# Patient Record
Sex: MALE | Race: BLACK OR AFRICAN AMERICAN | NOT HISPANIC OR LATINO | Employment: FULL TIME | URBAN - METROPOLITAN AREA
[De-identification: names, ages, dates, MRNs, and addresses within clinical notes are randomized per-mention and may not be internally consistent; named-entity substitution may affect disease eponyms.]

---

## 2017-02-14 ENCOUNTER — HOSPITAL ENCOUNTER (EMERGENCY)
Facility: HOSPITAL | Age: 57
Discharge: HOME | End: 2017-02-14
Attending: EMERGENCY MEDICINE | Admitting: EMERGENCY MEDICINE
Payer: MEDICAID

## 2017-02-14 VITALS
BODY MASS INDEX: 31.81 KG/M2 | OXYGEN SATURATION: 95 % | TEMPERATURE: 99 F | RESPIRATION RATE: 17 BRPM | WEIGHT: 240 LBS | HEART RATE: 112 BPM | SYSTOLIC BLOOD PRESSURE: 171 MMHG | HEIGHT: 73 IN | DIASTOLIC BLOOD PRESSURE: 75 MMHG

## 2017-02-14 DIAGNOSIS — S39.012A LUMBAR STRAIN, INITIAL ENCOUNTER: Primary | ICD-10-CM

## 2017-02-14 PROCEDURE — 99284 EMERGENCY DEPT VISIT MOD MDM: CPT | Mod: ,,, | Performed by: EMERGENCY MEDICINE

## 2017-02-14 PROCEDURE — 25000003 PHARM REV CODE 250: Performed by: EMERGENCY MEDICINE

## 2017-02-14 PROCEDURE — 99283 EMERGENCY DEPT VISIT LOW MDM: CPT

## 2017-02-14 RX ORDER — METHOCARBAMOL 750 MG/1
1500 TABLET, FILM COATED ORAL 3 TIMES DAILY
Qty: 30 TABLET | Refills: 0 | Status: SHIPPED | OUTPATIENT
Start: 2017-02-14 | End: 2017-02-20

## 2017-02-14 RX ORDER — IBUPROFEN 200 MG
600 TABLET ORAL EVERY 6 HOURS PRN
Qty: 20 TABLET | Refills: 0 | Status: SHIPPED | OUTPATIENT
Start: 2017-02-14 | End: 2017-02-20

## 2017-02-14 RX ORDER — NAPROXEN 500 MG/1
500 TABLET ORAL
Status: COMPLETED | OUTPATIENT
Start: 2017-02-14 | End: 2017-02-14

## 2017-02-14 RX ADMIN — NAPROXEN 500 MG: 500 TABLET ORAL at 11:02

## 2017-02-14 NOTE — ED AVS SNAPSHOT
[facility]  [address]  [facility] [address]               Polo Small   2017 11:01 PM   ED    Description:  Male : 1960   Department:  [facility]           Your Care was Coordinated By:     Provider Role From To    Michelle Navarrete MD Attending Provider [phone] --      Reason for Visit     Back Pain           Diagnoses this Visit        Comments    Lumbar strain, initial encounter    -  Primary       ED Disposition     ED Disposition Condition Comment    Discharge             To Do List           Follow-up Information     Follow up with Primary Doctor No In 1 week(s).       These Medications        Disp Refills Start End    methocarbamol (ROBAXIN) 750 MG Tab 30 tablet 0 2017    Take 2 tablets (1,500 mg total) by mouth 3 (three) times daily. - Oral    ibuprofen (ADVIL,MOTRIN) 200 MG tablet 20 tablet 0 2017     Take 3 tablets (600 mg total) by mouth every 6 (six) hours as needed for Pain. - Oral      [facility]     [facility] -  Assistance  Registered nurses in the [facility] provide clinical advisement, health education, appointment booking, and other advisory services.  Call for this free service at [phone].             Medications           Message regarding Medications     Verify the changes and/or additions to your medication regime listed below are the same as discussed with your clinician today.  If any of these changes or additions are incorrect, please notify your healthcare provider.        START taking these NEW medications        Refills    methocarbamol (ROBAXIN) 750 MG Tab 0    Sig: Take 2 tablets (1,500 mg total) by mouth 3 (three) times daily.    Class: Print    Route: Oral    ibuprofen (ADVIL,MOTRIN) 200 MG tablet 0    Sig: Take 3 tablets (600 mg total) by mouth every 6 (six) hours as needed for Pain.    Class: Print    Route: Oral      These  "medications were administered today        Dose Freq    naproxen tablet 500 mg 500 mg ED 1 Time    Sig: Take 1 tablet (500 mg total) by mouth ED 1 Time.    Class: Normal    Route: Oral           Verify that the below list of medications is an accurate representation of the medications you are currently taking.  If none reported, the list may be blank. If incorrect, please contact your healthcare provider. Carry this list with you in case of emergency.           Current Medications     ibuprofen (ADVIL,MOTRIN) 200 MG tablet Take 3 tablets (600 mg total) by mouth every 6 (six) hours as needed for Pain.    ibuprofen (ADVIL,MOTRIN) 800 MG tablet Take 1 tablet (800 mg total) by mouth every 6 (six) hours as needed for Pain.    methocarbamol (ROBAXIN) 750 MG Tab Take 2 tablets (1,500 mg total) by mouth 3 (three) times daily.    naproxen tablet 500 mg Take 1 tablet (500 mg total) by mouth ED 1 Time.           Clinical Reference Information           Your Vitals Were     BP Pulse Temp Resp Height Weight    171/75 (BP Location: Right arm, Patient Position: Sitting) 112 99.1 °F (37.3 °C) (Oral) 17 6' 1" (1.854 m) 108.9 kg (240 lb)    SpO2 BMI             95% 31.66 kg/m2         Allergies as of 2/14/2017     No Known Allergies      Immunizations Administered on Date of Encounter - 2/14/2017     None      ED Micro, Lab, POCT     None      ED Imaging Orders     None        Discharge Instructions           Back Sprain or Strain    Injury to the muscles (strain) or ligaments (sprain) around the spine can be troubling. Injury may occur after a sudden forceful twisting or bending force such as in a car accident, after a simple awkward movement, or after lifting something heavy with poor body positioning. In any case, muscle spasm is often present and adds to the pain.  Thankfully, most people feel better in 1 to 2 weeks, and most of the rest in 1 to 2 months. Most people can remain active. Unless you had a forceful or traumatic " physical injury such as a car accident or fall, X-rays may not be ordered for the first evaluation of a back sprain or strain. If pain continues and does not respond to medical treatment, your healthcare provider may then order X-rays and other tests.  Home care  The following guidelines will help you care for your injury at home:  · When in bed, try to find a comfortable position. A firm mattress is best. Try lying flat on your back with pillows under your knees. You can also try lying on your side with your knees bent up toward your chest and a pillow between your knees.  · Don't sit for long periods. Try not to take long car rides or take other trips that have you sitting for a long time. This puts more stress on the lower back than standing or walking.  · During the first 24 to 72 hours after an injury or flare-up, apply an ice pack to the painful area for 20 minutes. Then remove it for 20 minutes. Do this for 60 to 90 minutes, or several times a day. This will reduce swelling and pain. Be sure to wrap the ice pack in a thin towel or plastic to protect your skin.  · You can start with ice, then switch to heat. Heat from a hot shower, hot bath, or heating pad reduces pain and works well for muscle spasms. Put heat on the painful area for 20 minutes, then remove for 20 minutes. Do this for 60 to 90 minutes, or several times a day. Do not use a heating pad while sleeping. It can burn the skin.  · You can alternate the ice and heat. Talk with your healthcare provider to find out the best treatment or therapy for your back pain.  · Therapeutic massage will help relax the back muscles without stretching them.  · Be aware of safe lifting methods. Do not lift anything over 15 pounds until all of the pain is gone.  Medicines  Talk to your healthcare provider before using medicines, especially if you have other health problems or are taking other medicines.  · You may use acetaminophen or ibuprofen to control pain, unless  another pain medicine was prescribed. If you have chronic conditions like diabetes, liver or kidney disease, stomach ulcers, or gastrointestinal bleeding, or are taking blood-thinner medicines, talk with your doctor before taking any medicines.  · Be careful if you are given prescription medicines, narcotics, or medicine for muscle spasm. They can cause drowsiness, and affect your coordination, reflexes, and judgment. Do not drive or operate heavy machinery when taking these types of medicines. Only take pain medicine as prescribed by your healthcare provider.  Follow-up care  Follow up with your healthcare provider, or as advised. You may need physical therapy or more tests if your symptoms get worse.  If you had X-rays your healthcare provider may be checking for any broken bones, breaks, or fractures. Bruises and sprains can sometimes hurt as much as a fracture. These injuries can take time to heal completely. If your symptoms dont improve or they get worse, talk with your healthcare provider. You may need a repeat X-ray or other tests.  Call 911  Call for emergency care if any of the following occur:  · Trouble breathing  · Confused  · Very drowsy or trouble awakening  · Fainting or loss of consciousness  · Rapid or very slow heart rate  · Loss of bowel or bladder control  When to seek medical advice  Call your healthcare provider right away if any of the following occur:  · Pain gets worse or spreads to your arms or legs  · Weakness or numbness in one or both arms or legs  · Numbness in the groin or genital area  Date Last Reviewed: 6/1/2016  © 6385-5599 [employer]. [address] [place] [address]. All rights reserved. This information is not intended as a substitute for professional medical care. Always follow your healthcare professional's instructions.          Self-Care for Low Back Pain    Most people have low back pain now and then. In many cases, it isnt serious and self-care  can help. Sometimes low back pain can be a sign of a bigger problem. Call your healthcare provider if your pain returns often or gets worse over time. For the long-term care of your back, get regular exercise, lose any excess weight and learn good posture.  Take a short rest  Lying down during the day may be beneficial for short periods of time if severe pain increases with sitting or standing. Long-term bed rest could be detrimental.  Reduce pain and swelling  Cold reduces swelling. Both cold and heat can reduce pain. Protect your skin by placing a towel between your body and the ice or heat source.  · For the first few days, apply an ice pack for 15 to 20 minutes .  · After the first few days, try heat for 15 minutes at a time to ease pain. Never sleep on a heating pad.  · Over-the-counter medicine can help control pain and swelling. Try aspirin or ibuprofen.  Exercise  Exercise can help your back heal. It also helps your back get stronger and more flexible, preventing any reinjury. Ask your healthcare provider about specific exercises for your back.  Use good posture to avoid reinjury  · When moving, bend at the hips and knees. Dont bend at the waist or twist around.  · When lifting, keep the object close to your body. Dont try to lift more than you can handle.  · When sitting, keep your lower back supported. Use a rolled-up towel as needed.  Seek immediate medical care if:  · Youre unable to stand or walk.  · You have a temperature over 100.4°F (38.0°C)  · You have frequent, painful, or bloody urination.  · You have severe abdominal pain.  · You have a sharp, stabbing pain.  · Your pain is constant.  · You have pain or numbness in your leg.  · You feel pain in a new area of your back.  · You notice that the pain isnt decreasing after more than a week.   Date Last Reviewed: 9/29/2015  © 2274-1391 [employer] [address] [place] [address], [place] [address]. All rights reserved. This information is not  intended as a substitute for professional medical care. Always follow your healthcare professional's instructions.          [facility] Sign-Up     Activating your [facility] account is as easy as 1-2-3!     1) Visit [email or web address], select Sign Up Now, enter this activation code and your date of birth, then select Next.  [email or web address]  Expires: 3/31/2017 11:26 PM      2) Create a username and password to use when you visit [facility] in the future and select a security question in case you lose your password and select Next.    3) Enter your e-mail address and click Sign Up!    Additional Information  If you have questions, please e-mail [email or web address] or call [phone] to talk to our [facility] staff. Remember, [facility] is NOT to be used for urgent needs. For medical emergencies, dial 911.         Smoking Cessation     If you would like to quit smoking:   You may be eligible for free services if you are a [place] resident and started smoking cigarettes before September 1, 1988.  Call the Smoking Cessation Trust (SCT) toll free at (867) 623-6099 or (501) 138-4896.   Call 1-971-QUIT-NOW if you do not meet the above criteria.             [facility] complies with applicable Federal civil rights laws and does not discriminate on the basis of race, color, national origin, age, disability, or sex.        Language Assistance Services     ATTENTION: Language assistance services are available, free of charge. Please call [phone].      ATENCIÓN: Si habla español, tiene a verma disposición servicios gratuitos de asistencia lingüística. Llame al [phone].     CHÚ Ý: N?u b?n nói Ti?ng Vi?t, có các d?ch v? h? tr? ngôn ng? mi?n phí dành cho b?n. G?i s? [phone].

## 2017-02-15 NOTE — ED PROVIDER NOTES
"Encounter Date: 2/14/2017    SCRIBE #1 NOTE: I, Rashaun Napoleon, am scribing for, and in the presence of,  Michelle Navarrete MD. I have scribed the entire note.       History     Chief Complaint   Patient presents with    Back Pain     Pt reports back pain x 1.5 weeks. Pt states it started after doing heavy lifting at work.     Review of patient's allergies indicates:  No Known Allergies  HPI Comments: Time seen by provider: 11:17 PM    This is a 56 y.o. Male with no pertinent PMHx who presents with complaint of back pain with onset 2 weeks ago after pt reports heaving lifting at work and believes to have strained his back. 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 Pain exacerbated when bending over. When pain is present he states, "It feels like my legs are going to give out," but denies any numbness, paresthesia, unilateral weakness, urinary or bowel incontinence, dysuria, hematuria or difficulty ambulating. Denies taking anything for pain. No further complaints or concerns at this time.       The history is provided by the patient and medical records.     No past medical history on file.  Past Medical History Pertinent Negatives   Diagnosis Date Noted    Asthma 3/5/2013    Cancer 3/5/2013     No past surgical history on file.  No family history on file.  Social History   Substance Use Topics    Smoking status: Current Every Day Smoker     Packs/day: 1.00     Years: 25.00     Types: Cigarettes    Smokeless tobacco: Not on file    Alcohol use Yes      Comment: occasionally     Review of Systems   Genitourinary: Negative for difficulty urinating, dysuria and hematuria.   Musculoskeletal: Positive for back pain (with associated leg weakness only when present).   Neurological: Negative for weakness (no unilateral weakness) and numbness (or paresthesia).        (-) urinary or bowel incontinence or difficulty ambulating       Physical " Exam   Initial Vitals   BP Pulse Resp Temp SpO2   02/14/17 2014 02/14/17 2014 02/14/17 2014 02/14/17 2014 02/14/17 2014   171/75 112 17 99.1 °F (37.3 °C) 95 %     Physical Exam    Nursing note and vitals reviewed.  Constitutional: He appears well-developed and well-nourished. He is not diaphoretic. No distress.   HENT:   Head: Normocephalic and atraumatic.   Neck: Normal range of motion. Neck supple.   Cardiovascular: Normal rate, regular rhythm, normal heart sounds and intact distal pulses.   Pulmonary/Chest: Breath sounds normal. No respiratory distress. He has no wheezes. He has no rhonchi. He has no rales.   Musculoskeletal: Normal range of motion. He exhibits no edema.        Thoracic back: He exhibits no bony tenderness.        Lumbar back: He exhibits no bony tenderness.   Straight leg raise negative. There is left paraspinal muscle tenderness on flexion of lumbar spine. Sensation and strength 5/5.    Neurological: He is alert and oriented to person, place, and time. He has normal strength. No cranial nerve deficit or sensory deficit.         ED Course   Procedures  Labs Reviewed - No data to display          Medical Decision Making:   History:   Old Medical Records: I decided to obtain old medical records.  Old Records Summarized: records from clinic visits.  Initial Assessment:   Emergent evaluation of 56 year old male presenting with back pain. Differential includes muscle strain, muscle spasm, herniated disc, less likely sciatica. Will treat with muscle relaxer and pain medicine. Pt is stable for discharge.            Scribe Attestation:   Scribe #1: I performed the above scribed service and the documentation accurately describes the services I performed. I attest to the accuracy of the note.    Attending Attestation:           Physician Attestation for Scribe:  Physician Attestation Statement for Scribe #1: I, Michelle Navarrete MD, reviewed documentation, as scribed by Rashaun Bender in my presence, and it is  both accurate and complete.                 ED Course     Clinical Impression:   The encounter diagnosis was Lumbar strain, initial encounter.    Disposition:   Disposition: Discharged  Condition: Stable       Michelle Navarrete MD  02/15/17 0038

## 2017-02-15 NOTE — DISCHARGE INSTRUCTIONS
Back Sprain or Strain    Injury to the muscles (strain) or ligaments (sprain) around the spine can be troubling. Injury may occur after a sudden forceful twisting or bending force such as in a car accident, after a simple awkward movement, or after lifting something heavy with poor body positioning. In any case, muscle spasm is often present and adds to the pain.  Thankfully, most people feel better in 1 to 2 weeks, and most of the rest in 1 to 2 months. Most people can remain active. Unless you had a forceful or traumatic physical injury such as a car accident or fall, X-rays may not be ordered for the first evaluation of a back sprain or strain. If pain continues and does not respond to medical treatment, your healthcare provider may then order X-rays and other tests.  Home care  The following guidelines will help you care for your injury at home:  · When in bed, try to find a comfortable position. A firm mattress is best. Try lying flat on your back with pillows under your knees. You can also try lying on your side with your knees bent up toward your chest and a pillow between your knees.  · Don't sit for long periods. Try not to take long car rides or take other trips that have you sitting for a long time. This puts more stress on the lower back than standing or walking.  · During the first 24 to 72 hours after an injury or flare-up, apply an ice pack to the painful area for 20 minutes. Then remove it for 20 minutes. Do this for 60 to 90 minutes, or several times a day. This will reduce swelling and pain. Be sure to wrap the ice pack in a thin towel or plastic to protect your skin.  · You can start with ice, then switch to heat. Heat from a hot shower, hot bath, or heating pad reduces pain and works well for muscle spasms. Put heat on the painful area for 20 minutes, then remove for 20 minutes. Do this for 60 to 90 minutes, or several times a day. Do not use a heating pad while sleeping. It can burn the  skin.  · You can alternate the ice and heat. Talk with your healthcare provider to find out the best treatment or therapy for your back pain.  · Therapeutic massage will help relax the back muscles without stretching them.  · Be aware of safe lifting methods. Do not lift anything over 15 pounds until all of the pain is gone.  Medicines  Talk to your healthcare provider before using medicines, especially if you have other health problems or are taking other medicines.  · You may use acetaminophen or ibuprofen to control pain, unless another pain medicine was prescribed. If you have chronic conditions like diabetes, liver or kidney disease, stomach ulcers, or gastrointestinal bleeding, or are taking blood-thinner medicines, talk with your doctor before taking any medicines.  · Be careful if you are given prescription medicines, narcotics, or medicine for muscle spasm. They can cause drowsiness, and affect your coordination, reflexes, and judgment. Do not drive or operate heavy machinery when taking these types of medicines. Only take pain medicine as prescribed by your healthcare provider.  Follow-up care  Follow up with your healthcare provider, or as advised. You may need physical therapy or more tests if your symptoms get worse.  If you had X-rays your healthcare provider may be checking for any broken bones, breaks, or fractures. Bruises and sprains can sometimes hurt as much as a fracture. These injuries can take time to heal completely. If your symptoms dont improve or they get worse, talk with your healthcare provider. You may need a repeat X-ray or other tests.  Call 911  Call for emergency care if any of the following occur:  · Trouble breathing  · Confused  · Very drowsy or trouble awakening  · Fainting or loss of consciousness  · Rapid or very slow heart rate  · Loss of bowel or bladder control  When to seek medical advice  Call your healthcare provider right away if any of the following occur:  · Pain  gets worse or spreads to your arms or legs  · Weakness or numbness in one or both arms or legs  · Numbness in the groin or genital area  Date Last Reviewed: 6/1/2016 © 2000-2016 [employer]. [address] [place] [address], [place] [address]. All rights reserved. This information is not intended as a substitute for professional medical care. Always follow your healthcare professional's instructions.          Self-Care for Low Back Pain    Most people have low back pain now and then. In many cases, it isnt serious and self-care can help. Sometimes low back pain can be a sign of a bigger problem. Call your healthcare provider if your pain returns often or gets worse over time. For the long-term care of your back, get regular exercise, lose any excess weight and learn good posture.  Take a short rest  Lying down during the day may be beneficial for short periods of time if severe pain increases with sitting or standing. Long-term bed rest could be detrimental.  Reduce pain and swelling  Cold reduces swelling. Both cold and heat can reduce pain. Protect your skin by placing a towel between your body and the ice or heat source.  · For the first few days, apply an ice pack for 15 to 20 minutes .  · After the first few days, try heat for 15 minutes at a time to ease pain. Never sleep on a heating pad.  · Over-the-counter medicine can help control pain and swelling. Try aspirin or ibuprofen.  Exercise  Exercise can help your back heal. It also helps your back get stronger and more flexible, preventing any reinjury. Ask your healthcare provider about specific exercises for your back.  Use good posture to avoid reinjury  · When moving, bend at the hips and knees. Dont bend at the waist or twist around.  · When lifting, keep the object close to your body. Dont try to lift more than you can handle.  · When sitting, keep your lower back supported. Use a rolled-up towel as needed.  Seek immediate medical care  if:  · Youre unable to stand or walk.  · You have a temperature over 100.4°F (38.0°C)  · You have frequent, painful, or bloody urination.  · You have severe abdominal pain.  · You have a sharp, stabbing pain.  · Your pain is constant.  · You have pain or numbness in your leg.  · You feel pain in a new area of your back.  · You notice that the pain isnt decreasing after more than a week.   Date Last Reviewed: 9/29/2015 © 2000-2016 [employer]. [address] [place] [address]. All rights reserved. This information is not intended as a substitute for professional medical care. Always follow your healthcare professional's instructions.

## 2017-02-20 ENCOUNTER — HOSPITAL ENCOUNTER (EMERGENCY)
Facility: HOSPITAL | Age: 57
Discharge: HOME | End: 2017-02-20
Attending: EMERGENCY MEDICINE
Payer: MEDICAID

## 2017-02-20 VITALS
WEIGHT: 235 LBS | OXYGEN SATURATION: 94 % | TEMPERATURE: 98 F | BODY MASS INDEX: 31.14 KG/M2 | HEIGHT: 73 IN | RESPIRATION RATE: 20 BRPM | HEART RATE: 87 BPM | SYSTOLIC BLOOD PRESSURE: 142 MMHG | DIASTOLIC BLOOD PRESSURE: 98 MMHG

## 2017-02-20 DIAGNOSIS — S39.012A LUMBAR STRAIN, INITIAL ENCOUNTER: ICD-10-CM

## 2017-02-20 DIAGNOSIS — S39.012A BACK STRAIN, INITIAL ENCOUNTER: Primary | ICD-10-CM

## 2017-02-20 PROCEDURE — 99283 EMERGENCY DEPT VISIT LOW MDM: CPT

## 2017-02-20 PROCEDURE — 99283 EMERGENCY DEPT VISIT LOW MDM: CPT | Mod: ,,, | Performed by: EMERGENCY MEDICINE

## 2017-02-20 RX ORDER — METHOCARBAMOL 750 MG/1
1500 TABLET, FILM COATED ORAL 3 TIMES DAILY
Qty: 30 TABLET | Refills: 0 | Status: SHIPPED | OUTPATIENT
Start: 2017-02-20 | End: 2017-03-02

## 2017-02-20 RX ORDER — IBUPROFEN 200 MG
600 TABLET ORAL EVERY 6 HOURS PRN
Qty: 20 TABLET | Refills: 0 | Status: ON HOLD | OUTPATIENT
Start: 2017-02-20 | End: 2019-12-03 | Stop reason: HOSPADM

## 2017-02-20 NOTE — ED PROVIDER NOTES
Encounter Date: 2/20/2017    SCRIBE #1 NOTE: I, Snow Hernandezdmore , am scribing for, and in the presence of,  Dr. Rueda . I have scribed the entire note.       History     Chief Complaint   Patient presents with    Back Pain     lifting heavy stuff at work and pulled a muscle     Review of patient's allergies indicates:  No Known Allergies  HPI Comments: Time seen by provider: 7:17 AM    This is a 56 y.o. male with no significant medical history who presents with complaint of left lower back pain x 2 weeks. Pt. States that his pain was caused by heavy lifting at work. At previous [facility] visit he was given pain medication but he did not get the Rx filled. Patient can ambulate and more his lower extremities but his back hurts when he is in certain positions, specifically when bending over. He has no further complaints at this time.       The history is provided by the patient and medical records.     History reviewed. No pertinent past medical history.  Past Medical History Pertinent Negatives   Diagnosis Date Noted    Asthma 3/5/2013    Cancer 3/5/2013     History reviewed. No pertinent past surgical history.  History reviewed. No pertinent family history.  Social History   Substance Use Topics    Smoking status: Current Every Day Smoker     Packs/day: 1.00     Years: 25.00     Types: Cigarettes    Smokeless tobacco: None    Alcohol use Yes      Comment: occasionally     Review of Systems   Musculoskeletal: Positive for back pain (left lower back ).   Neurological: Negative for headaches.       Physical Exam   Initial Vitals   BP Pulse Resp Temp SpO2   02/20/17 0617 02/20/17 0617 02/20/17 0617 02/20/17 0617 02/20/17 0617   142/98 87 20 97.7 °F (36.5 °C) 94 %     Physical Exam    Nursing note and vitals reviewed.  Cardiovascular: Normal rate, regular rhythm and normal heart sounds.   No murmur heard.  Pulmonary/Chest: Breath sounds normal. No respiratory distress. He has no wheezes. He has no rhonchi. He has no  rales.   Abdominal: Soft. Bowel sounds are normal. He exhibits no distension and no mass. There is no tenderness. There is no rebound and no guarding.   Musculoskeletal: Normal range of motion. He exhibits no edema.   Paraspinal muscle tenderness on left side.    Neurological: He is alert and oriented to person, place, and time. He has normal strength. No cranial nerve deficit or sensory deficit.   Normal strength and sensation. Normal gait.         ED Course   Procedures  Labs Reviewed - No data to display          Medical Decision Making:   History:   Old Medical Records: I decided to obtain old medical records.  Initial Assessment:   Patient with back strain from heavy lifting. I doubt neurological dysfunction. Will discharge on NSAIDs and robaxin.             Scribe Attestation:   Scribe #1: I performed the above scribed service and the documentation accurately describes the services I performed. I attest to the accuracy of the note.    Attending Attestation:           Physician Attestation for Scribe:  Physician Attestation Statement for Scribe #1: I, Dr. Rueda, reviewed documentation, as scribed by Snow Bonner  in my presence, and it is both accurate and complete.                 ED Course     Clinical Impression:   The primary encounter diagnosis was Back strain, initial encounter. A diagnosis of Lumbar strain, initial encounter was also pertinent to this visit.    Disposition:   Disposition: Discharged  Condition: Stable       Mitch Rueda MD  02/20/17 0909

## 2017-02-20 NOTE — ED TRIAGE NOTES
Pt c/o back pain onset approx x3 weeks ago after heavy lifting at work. Pt reports being seen in ER approx 3 days ago for same complaint, but lost all of his discharge paperwork and has not been able to fill prescriptions.

## 2017-02-20 NOTE — DISCHARGE INSTRUCTIONS
Understanding Lumbosacral Strain    Lumbosacral strain is a medical term for an injury that causes low back pain. The lumbosacral area (low back) is between the bottom of the ribcage and the top of the buttocks. A strain is tearing of muscles and tendons. These tears can be very small but still cause pain.  How a lumbosacral strain happens  Muscles and tendons connected to the spine can be strained in a number of ways:  · Sitting or standing in the same position for long periods of time. This can harm the low back over time. Poor posture can make low back pain more likely.  · Moving the muscles and tendons past their usual range of motion. This can cause a sudden injury. This can happen when you twist, bend over, or lift something heavy. Not using correct technique for sports or tasks like lifting can make back injury more likely.  · Accidents or falls  Lumbosacral strain can be caused by other problems, but these are less common.  Symptoms of lumbosacral strain  Symptoms may include:  · Pain in the back, often on one side  · Pain that gets worse with movement and gets better with rest  · Inability to move as freely as usual  · Swelling, slight redness, and skin warmth in the painful area  Treatment for lumbosacral strain  Low back pain often goes away by itself within several weeks. But it often comes back. Treatment focuses on reducing pain and avoiding further injury. Bed rest is usually not recommended for low back pain. Treatments may include:  · Avoiding or changing the action that caused the problem. This helps prevent injuring the tissues again.  · Prescription or over-the-counter pain medicines. These help reduce inflammation, swelling, and pain.  · Cold or heat packs. These help reduce pain and swelling.  · Stretching and other exercises. These improve flexibility and strength.  · Physical therapy. This usually includes exercises and other treatments.  · Injections of medicine. This may relieve  symptoms.  If these treatments do not relieve symptoms, your healthcare provider may order imaging tests to learn more about the problem. Sometimes you may need surgery.  Possible complications of lumbosacral strain  If the cause of the pain is not addressed, symptoms may return or get worse. Follow your healthcare providers instructions on lifestyle changes and treating your back.     When to call your healthcare provider  Call your healthcare provider right away if you have any of these:  · Fever of 100.4°F (38°C) or higher, or as directed  · Numbness, tingling, or weakness  · Problems with bowel or bladder control, or problems having sex  · Pain that does not go away, or gets worse  · New symptoms   Date Last Reviewed: 3/10/2016  © 5747-9753 [employer]. [address] [place] [address], [place] [address]. All rights reserved. This information is not intended as a substitute for professional medical care. Always follow your healthcare professional's instructions.

## 2017-02-20 NOTE — ED AVS SNAPSHOT
[facility]  [address]  [facility] [address]               Polo Small   2017  6:53 AM   ED    Description:  Male : 1960   Department:  [facility]           Your Care was Coordinated By:     Provider Role From To    Mitch Rueda MD Attending Provider 17 0713 --      Reason for Visit     Back Pain           Diagnoses this Visit        Comments    Back strain, initial encounter    -  Primary     Lumbar strain, initial encounter           ED Disposition     None           To Do List           Follow-up Information     Schedule an appointment as soon as possible for a visit with ROMAN.    Contact information:    [address]  [facility] [address]  [phone]          Follow up with [facility].    Specialty:  Emergency Medicine    Why:  If symptoms worsen    Contact information:    [address]  [place] [id number] [phone]       These Medications        Disp Refills Start End    ibuprofen (ADVIL,MOTRIN) 200 MG tablet 20 tablet 0 2017     Take 3 tablets (600 mg total) by mouth every 6 (six) hours as needed for Pain. - Oral    methocarbamol (ROBAXIN) 750 MG Tab 30 tablet 0 2017 3/2/2017    Take 2 tablets (1,500 mg total) by mouth 3 (three) times daily. - Oral      [facility]     [facility] Nurse Care [facility] -  Assistance  Registered nurses in the [facility] provide clinical advisement, health education, appointment booking, and other advisory services.  Call for this free service at [phone].             Medications           Message regarding Medications     Verify the changes and/or additions to your medication regime listed below are the same as discussed with your clinician today.  If any of these changes or additions are incorrect, please notify your healthcare provider.             Verify that the below list of  "medications is an accurate representation of the medications you are currently taking.  If none reported, the list may be blank. If incorrect, please contact your healthcare provider. Carry this list with you in case of emergency.           Current Medications     ibuprofen (ADVIL,MOTRIN) 200 MG tablet Take 3 tablets (600 mg total) by mouth every 6 (six) hours as needed for Pain.    methocarbamol (ROBAXIN) 750 MG Tab Take 2 tablets (1,500 mg total) by mouth 3 (three) times daily.           Clinical Reference Information           Your Vitals Were     BP Pulse Temp Resp Height Weight    142/98 87 97.7 °F (36.5 °C) (Oral) 20 6' 1" (1.854 m) 106.6 kg (235 lb)    SpO2 BMI             94% 31 kg/m2         Allergies as of 2/20/2017     No Known Allergies      Immunizations Administered on Date of Encounter - 2/20/2017     None      ED Micro, Lab, POCT     None      ED Imaging Orders     None        Discharge Instructions         Understanding Lumbosacral Strain    Lumbosacral strain is a medical term for an injury that causes low back pain. The lumbosacral area (low back) is between the bottom of the ribcage and the top of the buttocks. A strain is tearing of muscles and tendons. These tears can be very small but still cause pain.  How a lumbosacral strain happens  Muscles and tendons connected to the spine can be strained in a number of ways:  · Sitting or standing in the same position for long periods of time. This can harm the low back over time. Poor posture can make low back pain more likely.  · Moving the muscles and tendons past their usual range of motion. This can cause a sudden injury. This can happen when you twist, bend over, or lift something heavy. Not using correct technique for sports or tasks like lifting can make back injury more likely.  · Accidents or falls  Lumbosacral strain can be caused by other problems, but these are less common.  Symptoms of lumbosacral strain  Symptoms may include:  · Pain in " the back, often on one side  · Pain that gets worse with movement and gets better with rest  · Inability to move as freely as usual  · Swelling, slight redness, and skin warmth in the painful area  Treatment for lumbosacral strain  Low back pain often goes away by itself within several weeks. But it often comes back. Treatment focuses on reducing pain and avoiding further injury. Bed rest is usually not recommended for low back pain. Treatments may include:  · Avoiding or changing the action that caused the problem. This helps prevent injuring the tissues again.  · Prescription or over-the-counter pain medicines. These help reduce inflammation, swelling, and pain.  · Cold or heat packs. These help reduce pain and swelling.  · Stretching and other exercises. These improve flexibility and strength.  · Physical therapy. This usually includes exercises and other treatments.  · Injections of medicine. This may relieve symptoms.  If these treatments do not relieve symptoms, your healthcare provider may order imaging tests to learn more about the problem. Sometimes you may need surgery.  Possible complications of lumbosacral strain  If the cause of the pain is not addressed, symptoms may return or get worse. Follow your healthcare providers instructions on lifestyle changes and treating your back.     When to call your healthcare provider  Call your healthcare provider right away if you have any of these:  · Fever of 100.4°F (38°C) or higher, or as directed  · Numbness, tingling, or weakness  · Problems with bowel or bladder control, or problems having sex  · Pain that does not go away, or gets worse  · New symptoms   Date Last Reviewed: 3/10/2016  © 9885-4872 [employer]. [address] [place] [address], [place] [address]. All rights reserved. This information is not intended as a substitute for professional medical care. Always follow your healthcare professional's instructions.          [facility] Sign-Up      Activating your [facility] account is as easy as 1-2-3!     1) Visit [email or web address], select Sign Up Now, enter this activation code and your date of birth, then select Next.  [email or web address]  Expires: 3/31/2017 11:26 PM      2) Create a username and password to use when you visit [facility] in the future and select a security question in case you lose your password and select Next.    3) Enter your e-mail address and click Sign Up!    Additional Information  If you have questions, please e-mail [email or web address] or call [phone] to talk to our [facility] staff. Remember, [facility] is NOT to be used for urgent needs. For medical emergencies, dial 911.         Smoking Cessation     If you would like to quit smoking:   You may be eligible for free services if you are a [place] resident and started smoking cigarettes before September 1, 1988.  Call the Smoking Cessation Trust ([facility]) toll free at (825) 867-7435 or (119) 938-8840.   Call 2-882-QUIT-NOW if you do not meet the above criteria.             [facility] complies with applicable Federal civil rights laws and does not discriminate on the basis of race, color, national origin, age, disability, or sex.        Language Assistance Services     ATTENTION: Language assistance services are available, free of charge. Please call [phone].      ATENCIÓN: Si habla español, tiene a verma disposición servicios gratuitos de asistencia lingüística. Llame al [phone].     [facility] Ý: N?u b?n nói Ti?ng Vi?t, có các d?ch v? h? tr? ngôn ng? mi?n phí dành cho b?n. G?i s? [phone].

## 2018-04-30 ENCOUNTER — HOSPITAL ENCOUNTER (EMERGENCY)
Facility: HOSPITAL | Age: 58
Discharge: HOME | End: 2018-04-30
Attending: EMERGENCY MEDICINE
Payer: MEDICAID

## 2018-04-30 VITALS
DIASTOLIC BLOOD PRESSURE: 98 MMHG | RESPIRATION RATE: 16 BRPM | OXYGEN SATURATION: 98 % | WEIGHT: 240 LBS | SYSTOLIC BLOOD PRESSURE: 144 MMHG | BODY MASS INDEX: 30.8 KG/M2 | HEIGHT: 74 IN | TEMPERATURE: 98 F | HEART RATE: 77 BPM

## 2018-04-30 DIAGNOSIS — T14.8XXA MUSCLE STRAIN: Primary | ICD-10-CM

## 2018-04-30 LAB
ALBUMIN SERPL BCP-MCNC: 4 G/DL
ALP SERPL-CCNC: 82 U/L
ALT SERPL W/O P-5'-P-CCNC: 38 U/L
ANION GAP SERPL CALC-SCNC: 9 MMOL/L
AST SERPL-CCNC: 43 U/L
BACTERIA #/AREA URNS AUTO: NORMAL /HPF
BASOPHILS # BLD AUTO: 0.03 K/UL
BASOPHILS NFR BLD: 0.6 %
BILIRUB SERPL-MCNC: 1 MG/DL
BILIRUB UR QL STRIP: NEGATIVE
BUN SERPL-MCNC: 11 MG/DL
CALCIUM SERPL-MCNC: 10 MG/DL
CHLORIDE SERPL-SCNC: 105 MMOL/L
CLARITY UR REFRACT.AUTO: CLEAR
CO2 SERPL-SCNC: 26 MMOL/L
COLOR UR AUTO: YELLOW
CREAT SERPL-MCNC: 1.1 MG/DL
DIFFERENTIAL METHOD: ABNORMAL
EOSINOPHIL # BLD AUTO: 0.3 K/UL
EOSINOPHIL NFR BLD: 4.7 %
ERYTHROCYTE [DISTWIDTH] IN BLOOD BY AUTOMATED COUNT: 11.1 %
EST. GFR  (AFRICAN AMERICAN): >60 ML/MIN/1.73 M^2
EST. GFR  (NON AFRICAN AMERICAN): >60 ML/MIN/1.73 M^2
GLUCOSE SERPL-MCNC: 88 MG/DL
GLUCOSE UR QL STRIP: NEGATIVE
HCT VFR BLD AUTO: 43.3 %
HGB BLD-MCNC: 14.5 G/DL
HGB UR QL STRIP: ABNORMAL
IMM GRANULOCYTES # BLD AUTO: 0.01 K/UL
IMM GRANULOCYTES NFR BLD AUTO: 0.2 %
KETONES UR QL STRIP: NEGATIVE
LEUKOCYTE ESTERASE UR QL STRIP: NEGATIVE
LYMPHOCYTES # BLD AUTO: 1.9 K/UL
LYMPHOCYTES NFR BLD: 36.6 %
MCH RBC QN AUTO: 30.8 PG
MCHC RBC AUTO-ENTMCNC: 33.5 G/DL
MCV RBC AUTO: 92 FL
MICROSCOPIC COMMENT: NORMAL
MONOCYTES # BLD AUTO: 0.8 K/UL
MONOCYTES NFR BLD: 14.8 %
NEUTROPHILS # BLD AUTO: 2.3 K/UL
NEUTROPHILS NFR BLD: 43.1 %
NITRITE UR QL STRIP: NEGATIVE
NRBC BLD-RTO: 0 /100 WBC
PH UR STRIP: 5 [PH] (ref 5–8)
PLATELET # BLD AUTO: 212 K/UL
PMV BLD AUTO: 10.1 FL
POTASSIUM SERPL-SCNC: 4 MMOL/L
PROT SERPL-MCNC: 7.8 G/DL
PROT UR QL STRIP: NEGATIVE
RBC # BLD AUTO: 4.71 M/UL
RBC #/AREA URNS AUTO: 2 /HPF (ref 0–4)
SODIUM SERPL-SCNC: 140 MMOL/L
SP GR UR STRIP: 1.01 (ref 1–1.03)
SQUAMOUS #/AREA URNS AUTO: 0 /HPF
URN SPEC COLLECT METH UR: ABNORMAL
UROBILINOGEN UR STRIP-ACNC: NEGATIVE EU/DL
WBC # BLD AUTO: 5.28 K/UL
WBC #/AREA URNS AUTO: 1 /HPF (ref 0–5)

## 2018-04-30 PROCEDURE — 99283 EMERGENCY DEPT VISIT LOW MDM: CPT

## 2018-04-30 PROCEDURE — 85025 COMPLETE CBC W/AUTO DIFF WBC: CPT

## 2018-04-30 PROCEDURE — 81001 URINALYSIS AUTO W/SCOPE: CPT

## 2018-04-30 PROCEDURE — 80053 COMPREHEN METABOLIC PANEL: CPT

## 2018-04-30 PROCEDURE — 25000003 PHARM REV CODE 250: Performed by: PHYSICIAN ASSISTANT

## 2018-04-30 PROCEDURE — 99283 EMERGENCY DEPT VISIT LOW MDM: CPT | Mod: ,,, | Performed by: PHYSICIAN ASSISTANT

## 2018-04-30 RX ORDER — METHOCARBAMOL 500 MG/1
500 TABLET, FILM COATED ORAL
Status: COMPLETED | OUTPATIENT
Start: 2018-04-30 | End: 2018-04-30

## 2018-04-30 RX ORDER — IBUPROFEN 600 MG/1
600 TABLET ORAL
Status: COMPLETED | OUTPATIENT
Start: 2018-04-30 | End: 2018-04-30

## 2018-04-30 RX ORDER — METHOCARBAMOL 500 MG/1
500 TABLET, FILM COATED ORAL 3 TIMES DAILY
Qty: 15 TABLET | Refills: 0 | Status: SHIPPED | OUTPATIENT
Start: 2018-04-30 | End: 2018-05-05

## 2018-04-30 RX ORDER — BUDESONIDE AND FORMOTEROL FUMARATE DIHYDRATE 160; 4.5 UG/1; UG/1
2 AEROSOL RESPIRATORY (INHALATION) EVERY 12 HOURS
Status: ON HOLD | COMMUNITY
End: 2019-12-03 | Stop reason: HOSPADM

## 2018-04-30 RX ADMIN — IBUPROFEN 600 MG: 600 TABLET, FILM COATED ORAL at 12:04

## 2018-04-30 RX ADMIN — METHOCARBAMOL 500 MG: 500 TABLET ORAL at 12:04

## 2018-04-30 NOTE — DISCHARGE INSTRUCTIONS
You should schedule an appointment with [facility] or [facility] to follow your Hepatitis C.

## 2018-04-30 NOTE — ED TRIAGE NOTES
Presents to ER with pain to his left quadrant for 6 months.  Pt identifiers checked and correct    LOC:   · The pt is awake, alert, and aware of environment with an appropriate affect,  as well as speaking appropriately; AAOx3 to person, place, and situation  APPEARANCE:   · Pt resting comfortably and in no acute distress; clean and well groomed  SKIN:   · Skin is warm and dry; color consistent with ethnicity; pt has normal skin turgor and moist mucus membranes  · Skin intact w/ no breakdown or brusing noted  MUSCULOSKELETAL:   · Pt moving all extremities well; absent of obvious deformities; Ambulates independently  RESPIRATORY:   · Airway is open and patent; respirations are spontaneous; normal effort and rate noted; absent of accessory-muscle use  · Breath sounds auscultated in all lung fields are noted to be clear and absent of adventitious sounds  CARDIAC:   · Pt denies chest pain; normal heart sounds auscultated; Pt has no peripheral edema noted; capillary refill < 3 seconds  · 2+ pulses palpated in all extremities   ABDOMEN:   · Soft and non-tender to palpation; no abnormal distention noted/reported; bowel sounds present x 4  NEUROLOGIC:   · PERRL, 3mm bilaterally, eyes open spontaneously; pt follows commands; facial expression symmetrical; equal hand  bilaterally; purposeful motor response noted  · Normal sensation to touch reported in distal region of all extremities

## 2018-04-30 NOTE — ED PROVIDER NOTES
Encounter Date: 4/30/2018       History     Chief Complaint   Patient presents with    Abdominal Pain     L  upper abd pain , hx hep c wanting to get om meds     57-year-old male with asthma and Hep C presents for left-sided flank pain times 5-6 months.  Reports the pain is aching, hurts more when he smokes cigarette or lies on it.  Denies trauma to the area.  Reports slight improvement with hydration.  Reports associated urinary frequency without dysuria, urgency, hematuria.  Denies fevers, abdominal pain, changes in bowel movements, chest pain, yellowing of skin or eyes.  Has not taken any pain medication for this.  He is also concerned about his hepatitis-C, has been diagnosed but not treated due to insurance issues.          Review of patient's allergies indicates:  No Known Allergies  Past Medical History:   Diagnosis Date    Hepatitis C      History reviewed. No pertinent surgical history.  Family History   Problem Relation Age of Onset    Cancer Mother     Heart disease Father      Social History   Substance Use Topics    Smoking status: Current Every Day Smoker     Packs/day: 1.00     Years: 25.00     Types: Cigarettes    Smokeless tobacco: Never Used    Alcohol use Yes      Comment: occasionally     Review of Systems   Constitutional: Negative for chills, fatigue and fever.   Eyes: Negative for visual disturbance.   Respiratory: Positive for cough and shortness of breath.    Cardiovascular: Negative for chest pain and palpitations.   Gastrointestinal: Negative for abdominal distention, nausea and vomiting.   Endocrine: Positive for polyuria.   Genitourinary: Positive for flank pain and frequency. Negative for difficulty urinating, dysuria, hematuria and urgency.   Musculoskeletal: Positive for back pain. Negative for gait problem, myalgias, neck pain and neck stiffness.   Skin: Negative for rash.   Neurological: Negative for light-headedness and headaches.   Hematological: Does not bruise/bleed  easily.       Physical Exam     Initial Vitals [04/30/18 1111]   BP Pulse Resp Temp SpO2   138/89 77 18 98 °F (36.7 °C) 98 %      MAP       105.33         Physical Exam    Nursing note and vitals reviewed.  Constitutional: He appears well-developed and well-nourished. He is not diaphoretic. No distress.   HENT:   Head: Normocephalic and atraumatic.   Eyes: EOM are normal. Pupils are equal, round, and reactive to light.   Neck: Normal range of motion. Neck supple.   Cardiovascular: Normal rate, regular rhythm and normal heart sounds. Exam reveals no gallop and no friction rub.    No murmur heard.  Pulmonary/Chest: Breath sounds normal. No respiratory distress. He has no wheezes. He has no rhonchi. He has no rales. He exhibits no tenderness.   Abdominal: Soft. Bowel sounds are normal. He exhibits no distension and no mass. There is no tenderness. There is no rebound and no guarding.   + CVA tenderness   Musculoskeletal: Normal range of motion. He exhibits no edema.   Tenderness to left lumbar paraspinous muscles   Neurological: He is alert and oriented to person, place, and time. He has normal strength.   Skin: Skin is warm and dry.   Psychiatric: He has a normal mood and affect.         ED Course   Procedures  Labs Reviewed   CBC W/ AUTO DIFFERENTIAL - Abnormal; Notable for the following:        Result Value    RDW 11.1 (*)     All other components within normal limits   URINALYSIS, REFLEX TO URINE CULTURE   COMPREHENSIVE METABOLIC PANEL   URINALYSIS MICROSCOPIC             Medical Decision Making:   History:   Old Medical Records: I decided to obtain old medical records.       APC / Resident Notes:   A 57-year-old male presenting with left-sided flank pain. Exam notable for tenderness to left lumbar paraspinous muscles. DdX includes muscle strain, nephrolithiasis, pyelonephritis.  Will check UA, baseline labs give Robaxin and Motrin reassess.    Patient feeling completely symptoms after Robaxin and Motrin.  Labs  unremarkable. Given negative workup I do not believe he requires further ED treatment is stable for discharge. Will provide resources for Hep C care including [facility] and [facility].  Advised patient is important to seek treatment at this may prevent future liver damage.  Discussed the importance of follow-up and ED return precautions.  Patient voiced understanding and is comfortable with discharge. Will DC with Rx for Robaxin.  I discussed this patient with my supervising physician.    Nhung Augustin PA-C                   Clinical Impression:   The encounter diagnosis was Muscle strain.    Disposition:   Disposition: Discharged  Condition: Stable                        Nhung Augustin PA-C  04/30/18 0207

## 2018-11-20 ENCOUNTER — HOSPITAL ENCOUNTER (EMERGENCY)
Facility: HOSPITAL | Age: 58
Discharge: HOME | End: 2018-11-20
Attending: EMERGENCY MEDICINE
Payer: MEDICAID

## 2018-11-20 VITALS
SYSTOLIC BLOOD PRESSURE: 159 MMHG | RESPIRATION RATE: 18 BRPM | WEIGHT: 225 LBS | TEMPERATURE: 98 F | OXYGEN SATURATION: 99 % | DIASTOLIC BLOOD PRESSURE: 83 MMHG | BODY MASS INDEX: 29.82 KG/M2 | HEART RATE: 77 BPM | HEIGHT: 73 IN

## 2018-11-20 DIAGNOSIS — N50.812 PAIN IN LEFT TESTICLE: ICD-10-CM

## 2018-11-20 DIAGNOSIS — N50.89 SWELLING OF LEFT TESTICLE: Primary | ICD-10-CM

## 2018-11-20 DIAGNOSIS — I86.1 VARICOCELE: ICD-10-CM

## 2018-11-20 DIAGNOSIS — N50.3 EPIDIDYMAL CYST: ICD-10-CM

## 2018-11-20 DIAGNOSIS — N43.3 HYDROCELE, UNSPECIFIED HYDROCELE TYPE: ICD-10-CM

## 2018-11-20 LAB
BILIRUB UR QL STRIP: NEGATIVE
CLARITY UR REFRACT.AUTO: ABNORMAL
COLOR UR AUTO: YELLOW
GLUCOSE UR QL STRIP: NEGATIVE
HGB UR QL STRIP: ABNORMAL
KETONES UR QL STRIP: NEGATIVE
LEUKOCYTE ESTERASE UR QL STRIP: NEGATIVE
MICROSCOPIC COMMENT: NORMAL
NITRITE UR QL STRIP: NEGATIVE
PH UR STRIP: 5 [PH] (ref 5–8)
PROT UR QL STRIP: NEGATIVE
RBC #/AREA URNS AUTO: 2 /HPF (ref 0–4)
SP GR UR STRIP: 1.01 (ref 1–1.03)
SQUAMOUS #/AREA URNS AUTO: 1 /HPF
URN SPEC COLLECT METH UR: ABNORMAL
WBC #/AREA URNS AUTO: 1 /HPF (ref 0–5)

## 2018-11-20 PROCEDURE — 96372 THER/PROPH/DIAG INJ SC/IM: CPT

## 2018-11-20 PROCEDURE — 87491 CHLMYD TRACH DNA AMP PROBE: CPT

## 2018-11-20 PROCEDURE — 25000003 PHARM REV CODE 250: Performed by: PHYSICIAN ASSISTANT

## 2018-11-20 PROCEDURE — 63600175 PHARM REV CODE 636 W HCPCS: Performed by: PHYSICIAN ASSISTANT

## 2018-11-20 PROCEDURE — 99284 EMERGENCY DEPT VISIT MOD MDM: CPT | Mod: 25

## 2018-11-20 PROCEDURE — 81001 URINALYSIS AUTO W/SCOPE: CPT

## 2018-11-20 PROCEDURE — 99284 EMERGENCY DEPT VISIT MOD MDM: CPT | Mod: ,,, | Performed by: PHYSICIAN ASSISTANT

## 2018-11-20 RX ORDER — TRAMADOL HYDROCHLORIDE 50 MG/1
50 TABLET ORAL
Status: COMPLETED | OUTPATIENT
Start: 2018-11-20 | End: 2018-11-20

## 2018-11-20 RX ORDER — AZITHROMYCIN 250 MG/1
1000 TABLET, FILM COATED ORAL
Status: COMPLETED | OUTPATIENT
Start: 2018-11-20 | End: 2018-11-20

## 2018-11-20 RX ORDER — CEFTRIAXONE 250 MG/1
250 INJECTION, POWDER, FOR SOLUTION INTRAMUSCULAR; INTRAVENOUS
Status: COMPLETED | OUTPATIENT
Start: 2018-11-20 | End: 2018-11-20

## 2018-11-20 RX ORDER — TRAMADOL HYDROCHLORIDE 50 MG/1
50 TABLET ORAL EVERY 6 HOURS PRN
Qty: 15 TABLET | Refills: 0 | Status: ON HOLD | OUTPATIENT
Start: 2018-11-20 | End: 2019-12-03 | Stop reason: HOSPADM

## 2018-11-20 RX ORDER — LIDOCAINE HYDROCHLORIDE 10 MG/ML
5 INJECTION, SOLUTION EPIDURAL; INFILTRATION; INTRACAUDAL; PERINEURAL
Status: COMPLETED | OUTPATIENT
Start: 2018-11-20 | End: 2018-11-20

## 2018-11-20 RX ADMIN — CEFTRIAXONE SODIUM 250 MG: 250 INJECTION, POWDER, FOR SOLUTION INTRAMUSCULAR; INTRAVENOUS at 03:11

## 2018-11-20 RX ADMIN — LIDOCAINE HYDROCHLORIDE 50 MG: 10 INJECTION, SOLUTION EPIDURAL; INFILTRATION; INTRACAUDAL; PERINEURAL at 03:11

## 2018-11-20 RX ADMIN — TRAMADOL HYDROCHLORIDE 50 MG: 50 TABLET, FILM COATED ORAL at 02:11

## 2018-11-20 RX ADMIN — AZITHROMYCIN 1000 MG: 250 TABLET, FILM COATED ORAL at 02:11

## 2018-11-20 NOTE — ED TRIAGE NOTES
Patient arrives to ED ambulatory with CC of left sided groin pain that is felt with movement, onset 2 days ago. Patient rates pain 10/10. No other complaints at this time.

## 2018-11-20 NOTE — ED PROVIDER NOTES
Encounter Date: 11/20/2018       History     Chief Complaint   Patient presents with    Groin Pain     i have a hernia, been hurting for 2-3 d     The patient presents to the ER for an emergent evaluation due to acute pain and swelling to left testicle. He states that the symptoms began gradually over the past 2-3 days. He denies any trauma or injury to the area. He states that this is a new problem. He states that he has had unprotected sex with a new partner recently. He states that the degree of pain is moderate to severe. He states that the course is constant and seems to be progressively getting worse. He denies any dysuria, decreased urination, or urethral discharge. No additional symptoms. No pre-arrival treatment.           Review of patient's allergies indicates:  No Known Allergies  Past Medical History:   Diagnosis Date    Asthma     Hepatitis C      History reviewed. No pertinent surgical history.  Family History   Problem Relation Age of Onset    Cancer Mother     Heart disease Father      Social History     Tobacco Use    Smoking status: Current Every Day Smoker     Packs/day: 1.00     Years: 25.00     Pack years: 25.00     Types: Cigarettes    Smokeless tobacco: Never Used   Substance Use Topics    Alcohol use: Yes     Comment: occasionally    Drug use: No     Review of Systems   Constitutional: Negative for activity change, appetite change, chills, fatigue, fever and unexpected weight change.   HENT: Negative for congestion and sore throat.    Eyes: Negative for redness.   Respiratory: Negative for cough and shortness of breath.    Cardiovascular: Negative for chest pain.   Gastrointestinal: Negative for abdominal pain, diarrhea, nausea and vomiting.   Genitourinary: Positive for scrotal swelling and testicular pain. Negative for decreased urine volume, difficulty urinating, discharge, dysuria, flank pain, frequency, genital sores, penile pain and penile swelling.   Musculoskeletal: Negative  for arthralgias, back pain, gait problem and joint swelling.   Skin: Negative for rash.   Allergic/Immunologic: Negative for immunocompromised state.   Neurological: Negative for dizziness, syncope, weakness and light-headedness.   Hematological: Negative for adenopathy.   Psychiatric/Behavioral: Negative for confusion.       Physical Exam     Initial Vitals [11/20/18 1045]   BP Pulse Resp Temp SpO2   (!) 140/73 87 18 98.7 °F (37.1 °C) 97 %      MAP       --         Physical Exam    Nursing note and vitals reviewed.  Constitutional: He appears well-developed and well-nourished.   HENT:   Head: Normocephalic.   Eyes: Conjunctivae are normal.   Cardiovascular: Normal rate.   Pulmonary/Chest: No respiratory distress.   Abdominal: Soft. He exhibits no distension. There is no tenderness. There is no rebound and no guarding.   Genitourinary:   Genitourinary Comments: Uncircumcised penis. No penile pain, swelling, or lesions. No inguinal hernia appreciated. No inguinal adenopathy. No urethral discharge noted. There is moderate pain and swelling to the left testicle. No abscess, erythema, cellulitis, or streaking.    Musculoskeletal: Normal range of motion.   No septic knee    Neurological: He is alert and oriented to person, place, and time. He has normal strength.   Skin: Skin is warm and dry. No abscess noted.   Psychiatric: He has a normal mood and affect.         ED Course   Procedures  Labs Reviewed   URINALYSIS, REFLEX TO URINE CULTURE - Abnormal; Notable for the following components:       Result Value    Appearance, UA Hazy (*)     Occult Blood UA 1+ (*)     All other components within normal limits    Narrative:     Preferred Collection Type->Urine, Clean Catch   C. TRACHOMATIS/N. GONORRHOEAE BY AMP DNA   URINALYSIS MICROSCOPIC    Narrative:     Preferred Collection Type->Urine, Clean Catch          Imaging Results          US Scrotum And Testicles (Final result)  Result time 11/20/18 14:21:30    Final result by  Tomer Malik MD (11/20/18 14:21:30)                 Impression:      Bilateral varicoceles, left more prominent than right.    Bilateral epididymal cysts.    Large simple cyst versus focal fluid collection/hydrocele adjacent to the left testicle.    Electronically signed by resident: Nancy Reagan  Date:    11/20/2018  Time:    14:05    Electronically signed by: Tomer Malik MD  Date:    11/20/2018  Time:    14:21             Narrative:    EXAMINATION:  US SCROTUM AND TESTICLES    CLINICAL HISTORY:  Left testicular pain    TECHNIQUE:  Sonography of the scrotum and testes.    COMPARISON:  None.    FINDINGS:  Right Testicle:    Size: 3.9 x 2.2 x 3.0 cm    Appearance: Normal    Flow: Normal arterial and venous flow    Epididymis: There are 1.7 x 1.0 x 1.9 cm and 2.3 x 1.1 x 1.3 cm simple cysts within the epididymal tail.  Additionally there is a 0.6 x 0.4 x 0.7 cm cyst within the epididymal head.    Hydrocele: None    Varicocele: Yes, there is dilatation of the plexus veins to 4 mm.    Left Testicle:    Size: 3.9 x 2.1 x 2.6 cm    Appearance: Normal appearance    Flow: Normal arterial and venous flow    Epididymis: 0.8 x 1.2 x 0.9 cm simple cyst in the epididymal tail.    Hydrocele: There is a large 6.8 x 4.7 x 6.0 cm fluid collection versus simple cyst just    adjacent to the left testis.    Varicocele: Yes, there is dilatation of the plexus veins to 4 mm.                                 Medical Decision Making:   Initial Assessment:   Pt here for an emergent evaluation due to acute atraumatic pain and swelling of left testicle x 2-3 days. Reports recent unprotected sexual encounter with new partner.   Differential Diagnosis:   Torsion, Hernia, Urethritis, Orchitis, Balanitis, Epididymitis, Albina's gangrene, Scrotal abscess, Spermatocele, Hydrocele, Varicocele, etc   Clinical Tests:   Lab Tests: Ordered and Reviewed  Radiological Study: Ordered and Reviewed  ED Management:  UA unremarkable   Culture for GC  sent due to history of recent unprotected sexual intercourse. Antibiotics given in the ER prophylactically. Analgesic given in the ER and prescribed. US shows hydrocele and varicocele. Urology followup advised.   Other:   I have discussed this case with another health care provider.  I discussed the case in detail with the ER attending physician, Dr Chawla, who also examined the patient.                    ED Course as of Nov 20 1445   Tue Nov 20, 2018   1205 This is the attending provider note  58-year-old male with a new sexual partner in unprotected sex comes here with 1 day of left testicular swelling and moderate throbbing pain physical exam shows normal penile shaft left testicle is swollen particularly behind the testes with epididymal area right testes looks normal were going to do an ultrasound  [SA]   1205  check for infections  [SA]   1438 Ultrasound report does not show any torsion there is good arterial flow there are some cysts that are present urine is normal because of recent unprotected sats will give Rocephin and azithromycin and he can follow up with Urology patient is stable for discharge home  [SA]      ED Course User Index  [SA] Jaden Forman MD     Clinical Impression:   The primary encounter diagnosis was Swelling of left testicle. Diagnoses of Pain in left testicle, Epididymal cyst, Hydrocele, unspecified hydrocele type, and Varicocele were also pertinent to this visit.      Disposition:   Disposition: Discharged  Condition: Stable                        Nicholas Alatorre PA-C  11/20/18 9610

## 2019-10-08 ENCOUNTER — HOSPITAL ENCOUNTER (EMERGENCY)
Facility: HOSPITAL | Age: 59
Discharge: HOME | End: 2019-10-08
Attending: EMERGENCY MEDICINE
Payer: MEDICAID

## 2019-10-08 VITALS
HEART RATE: 65 BPM | WEIGHT: 220 LBS | SYSTOLIC BLOOD PRESSURE: 140 MMHG | OXYGEN SATURATION: 99 % | BODY MASS INDEX: 29.16 KG/M2 | TEMPERATURE: 98 F | RESPIRATION RATE: 16 BRPM | DIASTOLIC BLOOD PRESSURE: 89 MMHG | HEIGHT: 73 IN

## 2019-10-08 DIAGNOSIS — M25.511 SHOULDER PAIN, RIGHT: ICD-10-CM

## 2019-10-08 PROCEDURE — 25000003 PHARM REV CODE 250: Performed by: EMERGENCY MEDICINE

## 2019-10-08 PROCEDURE — 99283 EMERGENCY DEPT VISIT LOW MDM: CPT | Mod: 25

## 2019-10-08 PROCEDURE — 99283 EMERGENCY DEPT VISIT LOW MDM: CPT | Mod: ,,, | Performed by: EMERGENCY MEDICINE

## 2019-10-08 PROCEDURE — 99283 PR EMERGENCY DEPT VISIT,LEVEL III: ICD-10-PCS | Mod: ,,, | Performed by: EMERGENCY MEDICINE

## 2019-10-08 RX ORDER — ACETAMINOPHEN 500 MG
1000 TABLET ORAL
Status: COMPLETED | OUTPATIENT
Start: 2019-10-08 | End: 2019-10-08

## 2019-10-08 RX ORDER — DEXTROMETHORPHAN HYDROBROMIDE, GUAIFENESIN 5; 100 MG/5ML; MG/5ML
650 LIQUID ORAL EVERY 8 HOURS
Qty: 60 TABLET | Refills: 0 | Status: ON HOLD | OUTPATIENT
Start: 2019-10-08 | End: 2019-12-03 | Stop reason: HOSPADM

## 2019-10-08 RX ORDER — TAMSULOSIN HYDROCHLORIDE 0.4 MG/1
0.4 CAPSULE ORAL DAILY
Status: ON HOLD | COMMUNITY
End: 2019-12-03 | Stop reason: HOSPADM

## 2019-10-08 RX ADMIN — ACETAMINOPHEN 1000 MG: 500 TABLET ORAL at 12:10

## 2019-10-08 NOTE — ED TRIAGE NOTES
Polo Small, a 59 y.o. male presents to the ED via personal transportation with CC right arm pain x3-4 months, progressively worsening, states may be related to heavy lifting at work. Denies specific injury.     Patient identifiers verified verbally with patient and correct for Polo Small.    LOC/ APPEARANCE: The patient is AAOx4. Pt is speaking appropriately, no slurred speech. Pt is clean and well groomed. No JVD visible. Pt reports pain level of 6/10, right upper arm. Pt updated on POC.  SKIN: Skin is warm dry and intact, and color is consistent with ethnicity. Capillary refill <3 seconds. No breakdown or brusing visible. Mucus membranes moist, acyanotic.  RESPIRATORY: Airway is open and patent. Respirations-spontaneous, unlabored, regular rate, equal bilaterally on inspiration and expiration. No accessory muscle use noted. Lungs clear to auscultation in all fields bilaterally anterior and posterior.   CARDIAC: Patient has regular heart rate.  No peripheral edema noted, and patient has no c/o chest pain. Peripheral pulses present equal and strong throughout.  ABDOMEN: Soft and non-tender to palpation with no distention noted.  NEUROLOGIC: Eyes open spontaneously and facial expression symmetrical. Pt behavior appropriate to situation, and pt follows commands. Pt reports sensation present in all extremities when touched with a finger, denies any numbness or tingling. PERRLA  MUSCULOSKELETAL: Spontaneous movement noted to all extremities. Hand  equal and leg strength strong +5 bilaterally. Full ROM noted to all extremities.

## 2019-10-08 NOTE — DISCHARGE INSTRUCTIONS
The X-ray of your right shoulder shows degenerative joint disease but no other injury.   I recommend that you take Tylenol 650 mg every 6 hours for pain.    I recommend that you follow up with an orthopedic doctor.  I have placed an outpatient referral to the [facility], but I have also given you the information for [facility] to make an appointment with an orthopedic doctor there if the clinic here cannot see you for any reason.  [facility] [phone]

## 2019-10-08 NOTE — ED PROVIDER NOTES
Encounter Date: 10/8/2019    SCRIBE #1 NOTE: I, Betty Lorenz, am scribing for, and in the presence of,  Dr. Peterson. I have scribed the following portions of the note - Other sections scribed: HPI, ROS, PE .       History     Chief Complaint   Patient presents with    Arm Pain     Pt c/o right upper arm pain.  States may be related to heavy lifting at work.      Time patient was seen by the provider: 12:14 PM      The patient is a 59 y.o. male with past medical history of hypertension, who presents to the ED with a complaint of right arm pain for 3-4 months. The patient reports pain is mostly on his upper arm, on his shoulder. States pain worsens with movement of his shoulder. The pain is worse at night while sleeping. Denies any weakness of the arm. He has not taken anything for the pain. His job consists of a lot of heavy lifting.     The history is provided by the patient and medical records.     Review of patient's allergies indicates:  No Known Allergies  Past Medical History:   Diagnosis Date    Asthma     Hepatitis C     Hypertension     Varicose veins of both lower extremities      History reviewed. No pertinent surgical history.  Family History   Problem Relation Age of Onset    Cancer Mother     Heart disease Father      Social History     Tobacco Use    Smoking status: Current Every Day Smoker     Packs/day: 1.00     Years: 25.00     Pack years: 25.00     Types: Cigarettes    Smokeless tobacco: Never Used   Substance Use Topics    Alcohol use: Yes     Comment: 3-4 beers every other day    Drug use: No     Review of Systems   Musculoskeletal:        +Right arm pain   Neurological: Negative for weakness.   All other systems reviewed and are negative.      Physical Exam     Initial Vitals [10/08/19 1203]   BP Pulse Resp Temp SpO2   (!) 147/75 87 14 98.4 °F (36.9 °C) 98 %      MAP       --         Physical Exam    Nursing note and vitals reviewed.  Constitutional: He appears well-developed  and well-nourished. No distress.   Cardiovascular: Intact distal pulses.   Musculoskeletal: Normal range of motion. He exhibits tenderness. He exhibits no edema.   Tenderness to the right anterior shoulder and right anterior proximal humerus with no bruising, redness or swelling.    Neurological: He is alert and oriented to person, place, and time. He has normal strength. No sensory deficit.   Strength is 5/5 in both upper and lower extremities. Sensations are intact.          ED Course   Procedures  Labs Reviewed - No data to display       Imaging Results          X-Ray Shoulder Complete 2 View Right (Final result)  Result time 10/08/19 13:33:36    Final result by Keaton Chavez MD (10/08/19 13:33:36)                 Impression:      See above      Electronically signed by: Keaton Chavez MD  Date:    10/08/2019  Time:    13:33             Narrative:    EXAMINATION:  XR SHOULDER COMPLETE 2 OR MORE VIEWS RIGHT    CLINICAL HISTORY:  Pain in right shoulder    TECHNIQUE:  Two or three views of the right shoulder were performed.    COMPARISON:  None    FINDINGS:  Mild DJD.  No fracture or dislocation.  No bone destruction identified                                 Medical Decision Making:   History:   Old Medical Records: I decided to obtain old medical records.  Independently Interpreted Test(s):   I have ordered and independently interpreted X-rays - see prior notes.  Clinical Tests:   Radiological Study: Ordered and Reviewed            Scribe Attestation:   Scribe #1: I performed the above scribed service and the documentation accurately describes the services I performed. I attest to the accuracy of the note.               Clinical Impression:       ICD-10-CM ICD-9-CM   1. Shoulder pain, right M25.511 719.41         Disposition:   Disposition: Discharged  Condition: Stable                        Dawna [facility] MD  10/10/19 2869

## 2019-12-02 ENCOUNTER — HOSPITAL ENCOUNTER (INPATIENT)
Facility: HOSPITAL | Age: 59
LOS: 1 days | Discharge: HOME | DRG: 896 | End: 2019-12-03
Attending: EMERGENCY MEDICINE | Admitting: HOSPITALIST
Payer: MEDICAID

## 2019-12-02 DIAGNOSIS — T79.6XXA TRAUMATIC RHABDOMYOLYSIS, INITIAL ENCOUNTER: ICD-10-CM

## 2019-12-02 DIAGNOSIS — F10.10 ALCOHOL ABUSE: ICD-10-CM

## 2019-12-02 DIAGNOSIS — R41.82 ALTERED MENTAL STATUS, UNSPECIFIED ALTERED MENTAL STATUS TYPE: Primary | ICD-10-CM

## 2019-12-02 DIAGNOSIS — B18.2 CHRONIC HEPATITIS C WITHOUT HEPATIC COMA: ICD-10-CM

## 2019-12-02 DIAGNOSIS — E87.6 HYPOKALEMIA: ICD-10-CM

## 2019-12-02 DIAGNOSIS — Z72.0 TOBACCO ABUSE: ICD-10-CM

## 2019-12-02 DIAGNOSIS — E53.8 B12 DEFICIENCY: ICD-10-CM
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
## 2019-12-02 DIAGNOSIS — F19.10 POLYSUBSTANCE ABUSE: ICD-10-CM

## 2019-12-02 DIAGNOSIS — E87.20 LACTIC ACIDOSIS: ICD-10-CM

## 2019-12-02 DIAGNOSIS — I10 BENIGN ESSENTIAL HTN: ICD-10-CM

## 2019-12-02 PROBLEM — Z75.8 DISCHARGE PLANNING ISSUES: Status: ACTIVE | Noted: 2019-12-02

## 2019-12-02 PROBLEM — T75.89XA ENVIRONMENTAL EXPOSURE: Status: ACTIVE | Noted: 2019-12-02

## 2019-12-02 PROBLEM — G93.41 ACUTE METABOLIC ENCEPHALOPATHY: Status: ACTIVE | Noted: 2019-12-02

## 2019-12-02 PROBLEM — R74.8 ABNORMAL CK: Status: ACTIVE | Noted: 2019-12-02

## 2019-12-02 PROBLEM — F19.959 PSYCHOACTIVE SUBSTANCE-INDUCED PSYCHOSIS: Status: ACTIVE | Noted: 2019-12-02
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
## 2019-12-02 PROBLEM — F14.921: Status: ACTIVE | Noted: 2019-12-02

## 2019-12-02 LAB
ABO + RH BLD: NORMAL
ALBUMIN SERPL BCP-MCNC: 4.5 G/DL (ref 3.5–5.2)
ALP SERPL-CCNC: 93 U/L (ref 55–135)
ALT SERPL W/O P-5'-P-CCNC: 23 U/L (ref 10–44)
AMPHET+METHAMPHET UR QL: NEGATIVE
ANION GAP SERPL CALC-SCNC: 18 MMOL/L (ref 8–16)
ANION GAP SERPL CALC-SCNC: 6 MMOL/L (ref 8–16)
APAP SERPL-MCNC: <3 UG/ML (ref 10–20)
APTT BLDCRRT: 24.3 SEC (ref 21–32)
AST SERPL-CCNC: 39 U/L (ref 10–40)
BACTERIA #/AREA URNS AUTO: ABNORMAL /HPF
BARBITURATES UR QL SCN>200 NG/ML: NEGATIVE
BASOPHILS # BLD AUTO: 0.03 K/UL (ref 0–0.2)
BASOPHILS NFR BLD: 0.4 % (ref 0–1.9)
BENZODIAZ UR QL SCN>200 NG/ML: NEGATIVE
BILIRUB SERPL-MCNC: 0.5 MG/DL (ref 0.1–1)
BILIRUB UR QL STRIP: NEGATIVE
BLD GP AB SCN CELLS X3 SERPL QL: NORMAL
BUN SERPL-MCNC: 11 MG/DL (ref 6–20)
BUN SERPL-MCNC: 11 MG/DL (ref 6–30)
BUN SERPL-MCNC: 9 MG/DL (ref 6–20)
BZE UR QL SCN: NORMAL
C TRACH DNA SPEC QL NAA+PROBE: NOT DETECTED
CALCIUM SERPL-MCNC: 8.9 MG/DL (ref 8.7–10.5)
CALCIUM SERPL-MCNC: 9.2 MG/DL (ref 8.7–10.5)
CANNABINOIDS UR QL SCN: NEGATIVE
CHLORIDE SERPL-SCNC: 100 MMOL/L (ref 95–110)
CHLORIDE SERPL-SCNC: 105 MMOL/L (ref 95–110)
CHLORIDE SERPL-SCNC: 99 MMOL/L (ref 95–110)
CK SERPL-CCNC: 1001 U/L (ref 20–200)
CK SERPL-CCNC: 1036 U/L (ref 20–200)
CLARITY UR REFRACT.AUTO: ABNORMAL
CO2 SERPL-SCNC: 20 MMOL/L (ref 23–29)
CO2 SERPL-SCNC: 30 MMOL/L (ref 23–29)
COLOR UR AUTO: YELLOW
CREAT SERPL-MCNC: 1 MG/DL (ref 0.5–1.4)
CREAT SERPL-MCNC: 1.2 MG/DL (ref 0.5–1.4)
CREAT SERPL-MCNC: 1.2 MG/DL (ref 0.5–1.4)
CREAT UR-MCNC: 64 MG/DL (ref 23–375)
DIFFERENTIAL METHOD: ABNORMAL
EOSINOPHIL # BLD AUTO: 0.1 K/UL (ref 0–0.5)
EOSINOPHIL NFR BLD: 0.8 % (ref 0–8)
ERYTHROCYTE [DISTWIDTH] IN BLOOD BY AUTOMATED COUNT: 10.8 % (ref 11.5–14.5)
EST. GFR  (AFRICAN AMERICAN): >60 ML/MIN/1.73 M^2
EST. GFR  (AFRICAN AMERICAN): >60 ML/MIN/1.73 M^2
EST. GFR  (NON AFRICAN AMERICAN): >60 ML/MIN/1.73 M^2
EST. GFR  (NON AFRICAN AMERICAN): >60 ML/MIN/1.73 M^2
GLUCOSE SERPL-MCNC: 148 MG/DL (ref 70–110)
GLUCOSE SERPL-MCNC: 176 MG/DL (ref 70–110)
GLUCOSE SERPL-MCNC: 181 MG/DL (ref 70–110)
GLUCOSE UR QL STRIP: NEGATIVE
HAV IGM SERPL QL IA: NEGATIVE
HBV CORE IGM SERPL QL IA: NEGATIVE
HBV SURFACE AG SERPL QL IA: NEGATIVE
HCT VFR BLD AUTO: 41.4 % (ref 40–54)
HCT VFR BLD CALC: 43 %PCV (ref 36–54)
HCV AB SERPL QL IA: POSITIVE
HGB BLD-MCNC: 13.7 G/DL (ref 14–18)
HGB UR QL STRIP: ABNORMAL
HIV 1+2 AB+HIV1 P24 AG SERPL QL IA: NEGATIVE
HYALINE CASTS UR QL AUTO: 6 /LPF
IMM GRANULOCYTES # BLD AUTO: 0.02 K/UL (ref 0–0.04)
IMM GRANULOCYTES NFR BLD AUTO: 0.3 % (ref 0–0.5)
INR PPP: 1 (ref 0.8–1.2)
KETONES UR QL STRIP: NEGATIVE
LACTATE SERPL-SCNC: 2.8 MMOL/L (ref 0.5–2.2)
LACTATE SERPL-SCNC: 3.8 MMOL/L (ref 0.5–2.2)
LACTATE SERPL-SCNC: 4.6 MMOL/L (ref 0.5–2.2)
LEUKOCYTE ESTERASE UR QL STRIP: NEGATIVE
LYMPHOCYTES # BLD AUTO: 2.1 K/UL (ref 1–4.8)
LYMPHOCYTES NFR BLD: 28.9 % (ref 18–48)
MAGNESIUM SERPL-MCNC: 2.1 MG/DL (ref 1.6–2.6)
MCH RBC QN AUTO: 30.9 PG (ref 27–31)
MCHC RBC AUTO-ENTMCNC: 33.1 G/DL (ref 32–36)
MCV RBC AUTO: 94 FL (ref 82–98)
METHADONE UR QL SCN>300 NG/ML: NEGATIVE
MICROSCOPIC COMMENT: ABNORMAL
MONOCYTES # BLD AUTO: 1 K/UL (ref 0.3–1)
MONOCYTES NFR BLD: 12.9 % (ref 4–15)
N GONORRHOEA DNA SPEC QL NAA+PROBE: NOT DETECTED
NEUTROPHILS # BLD AUTO: 4.2 K/UL (ref 1.8–7.7)
NEUTROPHILS NFR BLD: 56.7 % (ref 38–73)
NITRITE UR QL STRIP: NEGATIVE
NRBC BLD-RTO: 0 /100 WBC
OPIATES UR QL SCN: NEGATIVE
PCP UR QL SCN>25 NG/ML: NEGATIVE
PH UR STRIP: 5 [PH] (ref 5–8)
PLATELET # BLD AUTO: 228 K/UL (ref 150–350)
PMV BLD AUTO: 10 FL (ref 9.2–12.9)
POC IONIZED CALCIUM: 1.07 MMOL/L (ref 1.06–1.42)
POC TCO2 (MEASURED): 24 MMOL/L (ref 23–29)
POTASSIUM BLD-SCNC: 3.2 MMOL/L (ref 3.5–5.1)
POTASSIUM SERPL-SCNC: 3.2 MMOL/L (ref 3.5–5.1)
POTASSIUM SERPL-SCNC: 4 MMOL/L (ref 3.5–5.1)
PROT SERPL-MCNC: 8.1 G/DL (ref 6–8.4)
PROT UR QL STRIP: ABNORMAL
PROTHROMBIN TIME: 10.4 SEC (ref 9–12.5)
RBC # BLD AUTO: 4.43 M/UL (ref 4.6–6.2)
RBC #/AREA URNS AUTO: 1 /HPF (ref 0–4)
RPR SER QL: NORMAL
SALICYLATES SERPL-MCNC: <5 MG/DL (ref 15–30)
SAMPLE: ABNORMAL
SODIUM BLD-SCNC: 138 MMOL/L (ref 136–145)
SODIUM SERPL-SCNC: 138 MMOL/L (ref 136–145)
SODIUM SERPL-SCNC: 141 MMOL/L (ref 136–145)
SP GR UR STRIP: 1.01 (ref 1–1.03)
SQUAMOUS #/AREA URNS AUTO: 0 /HPF
TOXICOLOGY INFORMATION: NORMAL
TSH SERPL DL<=0.005 MIU/L-ACNC: 0.5 UIU/ML (ref 0.4–4)
URN SPEC COLLECT METH UR: ABNORMAL
WBC # BLD AUTO: 7.38 K/UL (ref 3.9–12.7)
WBC #/AREA URNS AUTO: 1 /HPF (ref 0–5)

## 2019-12-02 PROCEDURE — 99223 PR INITIAL HOSPITAL CARE,LEVL III: ICD-10-PCS | Mod: ,,, | Performed by: HOSPITALIST

## 2019-12-02 PROCEDURE — 96375 TX/PRO/DX INJ NEW DRUG ADDON: CPT

## 2019-12-02 PROCEDURE — 84443 ASSAY THYROID STIM HORMONE: CPT

## 2019-12-02 PROCEDURE — 85025 COMPLETE CBC W/AUTO DIFF WBC: CPT

## 2019-12-02 PROCEDURE — 83605 ASSAY OF LACTIC ACID: CPT

## 2019-12-02 PROCEDURE — 80307 DRUG TEST PRSMV CHEM ANLYZR: CPT

## 2019-12-02 PROCEDURE — 86592 SYPHILIS TEST NON-TREP QUAL: CPT

## 2019-12-02 PROCEDURE — 99284 EMERGENCY DEPT VISIT MOD MDM: CPT | Mod: ,,, | Performed by: NURSE PRACTITIONER

## 2019-12-02 PROCEDURE — 80047 BASIC METABLC PNL IONIZED CA: CPT

## 2019-12-02 PROCEDURE — 80053 COMPREHEN METABOLIC PANEL: CPT

## 2019-12-02 PROCEDURE — 25000003 PHARM REV CODE 250: Performed by: STUDENT IN AN ORGANIZED HEALTH CARE EDUCATION/TRAINING PROGRAM

## 2019-12-02 PROCEDURE — 93010 EKG 12-LEAD: ICD-10-PCS | Mod: ,,, | Performed by: INTERNAL MEDICINE

## 2019-12-02 PROCEDURE — 93010 ELECTROCARDIOGRAM REPORT: CPT | Mod: ,,, | Performed by: INTERNAL MEDICINE

## 2019-12-02 PROCEDURE — 80074 ACUTE HEPATITIS PANEL: CPT

## 2019-12-02 PROCEDURE — 11000001 HC ACUTE MED/SURG PRIVATE ROOM

## 2019-12-02 PROCEDURE — 87491 CHLMYD TRACH DNA AMP PROBE: CPT

## 2019-12-02 PROCEDURE — 63600175 PHARM REV CODE 636 W HCPCS: Performed by: STUDENT IN AN ORGANIZED HEALTH CARE EDUCATION/TRAINING PROGRAM

## 2019-12-02 PROCEDURE — 36415 COLL VENOUS BLD VENIPUNCTURE: CPT

## 2019-12-02 PROCEDURE — 87040 BLOOD CULTURE FOR BACTERIA: CPT

## 2019-12-02 PROCEDURE — 25500020 PHARM REV CODE 255: Performed by: EMERGENCY MEDICINE

## 2019-12-02 PROCEDURE — 85610 PROTHROMBIN TIME: CPT

## 2019-12-02 PROCEDURE — 96361 HYDRATE IV INFUSION ADD-ON: CPT

## 2019-12-02 PROCEDURE — 83735 ASSAY OF MAGNESIUM: CPT

## 2019-12-02 PROCEDURE — 85730 THROMBOPLASTIN TIME PARTIAL: CPT

## 2019-12-02 PROCEDURE — 96366 THER/PROPH/DIAG IV INF ADDON: CPT | Mod: 59

## 2019-12-02 PROCEDURE — 83605 ASSAY OF LACTIC ACID: CPT | Mod: 91

## 2019-12-02 PROCEDURE — 99291 PR CRITICAL CARE, E/M 30-74 MINUTES: ICD-10-PCS | Mod: ,,, | Performed by: EMERGENCY MEDICINE

## 2019-12-02 PROCEDURE — 99223 1ST HOSP IP/OBS HIGH 75: CPT | Mod: ,,, | Performed by: HOSPITALIST

## 2019-12-02 PROCEDURE — 80329 ANALGESICS NON-OPIOID 1 OR 2: CPT

## 2019-12-02 PROCEDURE — 86850 RBC ANTIBODY SCREEN: CPT

## 2019-12-02 PROCEDURE — 93005 ELECTROCARDIOGRAM TRACING: CPT

## 2019-12-02 PROCEDURE — 99291 CRITICAL CARE FIRST HOUR: CPT | Mod: ,,, | Performed by: EMERGENCY MEDICINE

## 2019-12-02 PROCEDURE — 86703 HIV-1/HIV-2 1 RESULT ANTBDY: CPT

## 2019-12-02 PROCEDURE — 82550 ASSAY OF CK (CPK): CPT

## 2019-12-02 PROCEDURE — 99284 PR EMERGENCY DEPT VISIT,LEVEL IV: ICD-10-PCS | Mod: ,,, | Performed by: NURSE PRACTITIONER

## 2019-12-02 PROCEDURE — 80048 BASIC METABOLIC PNL TOTAL CA: CPT

## 2019-12-02 PROCEDURE — 99291 CRITICAL CARE FIRST HOUR: CPT | Mod: 25

## 2019-12-02 PROCEDURE — 82550 ASSAY OF CK (CPK): CPT | Mod: 91

## 2019-12-02 PROCEDURE — 96372 THER/PROPH/DIAG INJ SC/IM: CPT | Mod: 59

## 2019-12-02 PROCEDURE — 81001 URINALYSIS AUTO W/SCOPE: CPT

## 2019-12-02 PROCEDURE — 96365 THER/PROPH/DIAG IV INF INIT: CPT

## 2019-12-02 RX ORDER — THIAMINE HCL 100 MG
100 TABLET ORAL DAILY
Status: DISCONTINUED | OUTPATIENT
Start: 2019-12-02 | End: 2019-12-03 | Stop reason: HOSPADM

## 2019-12-02 RX ORDER — IBUPROFEN 200 MG
24 TABLET ORAL
Status: DISCONTINUED | OUTPATIENT
Start: 2019-12-02 | End: 2019-12-03 | Stop reason: HOSPADM

## 2019-12-02 RX ORDER — POTASSIUM CHLORIDE 7.45 MG/ML
10 INJECTION INTRAVENOUS
Status: COMPLETED | OUTPATIENT
Start: 2019-12-02 | End: 2019-12-02

## 2019-12-02 RX ORDER — GLUCAGON 1 MG
1 KIT INJECTION
Status: DISCONTINUED | OUTPATIENT
Start: 2019-12-02 | End: 2019-12-03 | Stop reason: HOSPADM

## 2019-12-02 RX ORDER — CEFEPIME HYDROCHLORIDE 2 G/1
2 INJECTION, POWDER, FOR SOLUTION INTRAVENOUS
Status: COMPLETED | OUTPATIENT
Start: 2019-12-02 | End: 2019-12-02

## 2019-12-02 RX ORDER — ZIPRASIDONE MESYLATE 20 MG/ML
20 INJECTION, POWDER, LYOPHILIZED, FOR SOLUTION INTRAMUSCULAR
Status: COMPLETED | OUTPATIENT
Start: 2019-12-02 | End: 2019-12-02

## 2019-12-02 RX ORDER — SODIUM CHLORIDE 0.9 % (FLUSH) 0.9 %
10 SYRINGE (ML) INJECTION
Status: DISCONTINUED | OUTPATIENT
Start: 2019-12-02 | End: 2019-12-03 | Stop reason: HOSPADM

## 2019-12-02 RX ORDER — VANCOMYCIN 2 GRAM/500 ML IN 0.9 % SODIUM CHLORIDE INTRAVENOUS
2000 ONCE
Status: COMPLETED | OUTPATIENT
Start: 2019-12-02 | End: 2019-12-02

## 2019-12-02 RX ORDER — IBUPROFEN 200 MG
16 TABLET ORAL
Status: DISCONTINUED | OUTPATIENT
Start: 2019-12-02 | End: 2019-12-03 | Stop reason: HOSPADM

## 2019-12-02 RX ORDER — POTASSIUM CHLORIDE 20 MEQ/1
40 TABLET, EXTENDED RELEASE ORAL ONCE
Status: COMPLETED | OUTPATIENT
Start: 2019-12-02 | End: 2019-12-02

## 2019-12-02 RX ADMIN — POTASSIUM CHLORIDE 40 MEQ: 1500 TABLET, EXTENDED RELEASE ORAL at 02:12

## 2019-12-02 RX ADMIN — ZIPRASIDONE MESYLATE 20 MG: 20 INJECTION, POWDER, LYOPHILIZED, FOR SOLUTION INTRAMUSCULAR at 02:12

## 2019-12-02 RX ADMIN — IOHEXOL 100 ML: 350 INJECTION, SOLUTION INTRAVENOUS at 02:12

## 2019-12-02 RX ADMIN — VANCOMYCIN HYDROCHLORIDE 2000 MG: 100 INJECTION, POWDER, LYOPHILIZED, FOR SOLUTION INTRAVENOUS at 04:12

## 2019-12-02 RX ADMIN — Medication 100 MG: at 09:12

## 2019-12-02 RX ADMIN — SODIUM CHLORIDE 1000 ML: 0.9 INJECTION, SOLUTION INTRAVENOUS at 03:12

## 2019-12-02 RX ADMIN — SODIUM CHLORIDE, SODIUM LACTATE, POTASSIUM CHLORIDE, AND CALCIUM CHLORIDE 2000 ML: .6; .31; .03; .02 INJECTION, SOLUTION INTRAVENOUS at 04:12

## 2019-12-02 RX ADMIN — SODIUM CHLORIDE, SODIUM LACTATE, POTASSIUM CHLORIDE, AND CALCIUM CHLORIDE 1000 ML: .6; .31; .03; .02 INJECTION, SOLUTION INTRAVENOUS at 04:12

## 2019-12-02 RX ADMIN — CEFEPIME 2 G: 2 INJECTION, POWDER, FOR SOLUTION INTRAVENOUS at 04:12

## 2019-12-02 RX ADMIN — POTASSIUM CHLORIDE 10 MEQ: 7.46 INJECTION, SOLUTION INTRAVENOUS at 05:12
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
## 2019-12-02 NOTE — ED NOTES
Pt transported to [address] with ED tech and security.  Luis Fernando Madrigal with pt.  All belongings sent with pt.  All psych paperwork including original PEC sent with pt.   Pt calm & cooperative upon admission.
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
## 2019-12-02 NOTE — ED NOTES
Pt care assumed.   Pt lying on stretcher, AAO x 4.  Yojana Gould at bedside.  Pt has c-collar in place.  Pt c/o pain only to his finger.  Pt on cardiac, blood pressure & pulse ox monitoring.  Pt updated on plan of care, states understanding. Pt is not in restraints at this time.  Will continue to monitor.

## 2019-12-02 NOTE — NURSING
PEC form faxed to 's Office. Called placed to 's office to notify them about patient admission as well.

## 2019-12-02 NOTE — PLAN OF CARE
CM at bedside to discuss discharge planning assessment.   Independent with ADL and does not use medical equipment.  Discharge plan is home with no needs.  CM name and phone # written on board and explained CM services during patient's stay in hospital.       12/02/19 1208   Discharge Assessment   Assessment Type Discharge Planning Assessment   Confirmed/corrected address and phone number on facesheet? Yes   Assessment information obtained from? Patient   Expected Length of Stay (days) 2   Communicated expected length of stay with patient/caregiver yes   Prior to hospitilization cognitive status: Alert/Oriented   Prior to hospitalization functional status: Independent   Current cognitive status: Risk of Harm to Self/Others  (patient is PEC'd)   Current Functional Status: Independent   Facility Arrived From: home   Lives With alone   Able to Return to Prior Arrangements yes   Is patient able to care for self after discharge? Unable to determine at this time (comments)   Who are your caregiver(s) and their phone number(s)? self   Patient's perception of discharge disposition home or selfcare   Readmission Within the Last 30 Days no previous admission in last 30 days   Patient currently being followed by outpatient case management? No   Patient currently receives any other outside agency services? No   Do you have any problems affording any of your prescribed medications? No   Is the patient taking medications as prescribed? yes   Does the patient have transportation home? Yes   Transportation Anticipated family or friend will provide   Dialysis Name and Scheduled days n/a   Does the patient receive services at the [facility]? No   Discharge Plan A Home   DME Needed Upon Discharge  none   Patient/Family in Agreement with Plan yes     Extended Emergency Contact Information  Primary Emergency Contact: Monse Small  Address: [address] [place] [address] [address]           [place] [address] [place]  Mobile Phone:  [phone]  Relation: Sister  Preferred language: English

## 2019-12-02 NOTE — ASSESSMENT & PLAN NOTE
No significant injuries noted  S/p CT head, neck, C/A/P without acute fracture, dislocation  Removed from C-collar, no midline c-spine tenderness.

## 2019-12-02 NOTE — SUBJECTIVE & OBJECTIVE
Past Medical History:   Diagnosis Date    Asthma     Hepatitis C     Hypertension     Varicose veins of both lower extremities        History reviewed. No pertinent surgical history.    Review of patient's allergies indicates:  No Known Allergies    Family History     Problem Relation (Age of Onset)    Cancer Mother    Heart disease Father        Tobacco Use    Smoking status: Current Every Day Smoker     Packs/day: 1.00     Years: 25.00     Pack years: 25.00     Types: Cigarettes    Smokeless tobacco: Never Used   Substance and Sexual Activity    Alcohol use: Yes     Comment: 3-4 beers every other day    Drug use: No    Sexual activity: Never      Review of Systems   Constitutional: Negative for chills, diaphoresis and fever.   HENT: Negative for nosebleeds, sneezing and sore throat.    Eyes: Negative for pain, discharge and itching.   Respiratory: Negative for cough, chest tightness and shortness of breath.    Cardiovascular: Negative for chest pain, palpitations and leg swelling.   Gastrointestinal: Negative for abdominal pain, diarrhea, nausea and vomiting.   Genitourinary: Positive for difficulty urinating. Negative for dysuria and flank pain.   Musculoskeletal: Negative for back pain, neck pain and neck stiffness.   Skin: Negative for color change, pallor and rash.   Neurological: Negative for seizures, syncope and headaches.   Psychiatric/Behavioral: Positive for agitation and behavioral problems. The patient is not nervous/anxious.      Objective:     Vital Signs (Most Recent):  Temp: (!) 95.3 °F (35.2 °C) (12/02/19 0357)  Pulse: 75 (12/02/19 0501)  Resp: 16 (12/02/19 0501)  BP: (!) 142/77 (12/02/19 0501)  SpO2: 97 % (12/02/19 0501) Vital Signs (24h Range):  Temp:  [95.3 °F (35.2 °C)] 95.3 °F (35.2 °C)  Pulse:  [] 75  Resp:  [16] 16  SpO2:  [95 %-97 %] 97 %  BP: (142-174)/(77-94) 142/77      There is no height or weight on file to calculate BMI.      Intake/Output Summary (Last 24 hours) at  12/2/2019 0515  Last data filed at 12/2/2019 0412  Gross per 24 hour   Intake 1000 ml   Output 1250 ml   Net -250 ml       Physical Exam   Constitutional: He appears well-developed and well-nourished. He appears lethargic. No distress.   HENT:   Head: Normocephalic and atraumatic.   Right Ear: External ear normal.   Left Ear: External ear normal.   Nose: Nose normal.   Mouth/Throat: Oropharynx is clear and moist.   Eyes: Conjunctivae and EOM are normal.   Pinpoint pupils bilaterally   Neck: Normal range of motion. Neck supple.   Cardiovascular: Normal rate, regular rhythm, normal heart sounds and intact distal pulses.   No murmur heard.  Pulmonary/Chest: Effort normal and breath sounds normal. No stridor. No respiratory distress. He has no wheezes.   Abdominal: Soft. Bowel sounds are normal. He exhibits no distension. There is no tenderness.   Genitourinary: Penis normal.   Musculoskeletal: Normal range of motion. He exhibits no edema, tenderness or deformity.   Neurological: He appears lethargic. GCS eye subscore is 3. GCS verbal subscore is 5. GCS motor subscore is 6.   Slightly lethargic following administration of geodon, however awakens to voice and follows commands/answers questions appropriately   Skin: Skin is warm and dry. Capillary refill takes less than 2 seconds. He is not diaphoretic.   Psychiatric: He has a normal mood and affect. His behavior is normal. Judgment and thought content normal.   Nursing note and vitals reviewed.      Vents:     Lines/Drains/Airways     Peripheral Intravenous Line                 Peripheral IV - Single Lumen 12/02/19 0234 18 G Right Antecubital less than 1 day         Peripheral IV - Single Lumen 12/02/19 0403 18 G Left Antecubital less than 1 day              Significant Labs:    CBC/Anemia Profile:  Recent Labs   Lab 12/02/19  0235 12/02/19  0243   WBC 7.38  --    HGB 13.7*  --    HCT 41.4 43     --    MCV 94  --    RDW 10.8*  --         Chemistries:  Recent Labs    Lab 12/02/19  0235      K 3.2*      CO2 20*   BUN 11   CREATININE 1.2   CALCIUM 9.2   ALBUMIN 4.5   PROT 8.1   BILITOT 0.5   ALKPHOS 93   ALT 23   AST 39   MG 2.1       All pertinent labs within the past 24 hours have been reviewed.    Significant Imaging: I have reviewed and interpreted all pertinent imaging results/findings within the past 24 hours.

## 2019-12-02 NOTE — ASSESSMENT & PLAN NOTE
Elevated to 4.6 on admission, minimal improvement with fluid resuscitation.   Started on broad spectrum antibiotics on admission in setting of hypothermia, tachycardia, encephalopathy  Will recheck lactic acidosis 4 hours from previous. If not improving may require further investigation for source  Holding further antibiotics at this time unless clinically changes. No evidence of infection as  of lactic acidosis

## 2019-12-02 NOTE — ED NOTES
The room has been made safe for pt arrival. Monitoring equipment neccessary at this time. Sitter is at the bedside with no personal belongings to provide direct visual observation and q15 minute checks. The patient has been searched for harmful objects and changed into paper scrubs as per protocol. Pt belongings have been removed from the room, labelled in a bag and secured in pt belongings closet. The pt has been given an explanation of pt rights while hospitalized.
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
## 2019-12-02 NOTE — ED NOTES
Pt AAO x 4.  No changes in status.  Pt updated on plan of care and admission, states understanding.  Pt remains calm & cooperative.  Sitter remains at bedside for q 15 min checks.

## 2019-12-02 NOTE — ED NOTES
Patient resting in stretcher and is in NAD at this time. Pt is sleeping, respirations even and unlabored. Pt calm. Environment safe. Risk sitter at bedside for one to one observation and q15min safety checks. Bed low and locked with side rails up x2.

## 2019-12-02 NOTE — ED NOTES
Pt given security envelope to put his money in.  Pt states he does not want to send it in to security.

## 2019-12-02 NOTE — ED NOTES
Pt sleeping, awakens easily to voice.  Pt denies any new c/o at present.  Sitter remains at bedside for q 15 min checks.  Will continue to monitor.

## 2019-12-02 NOTE — ASSESSMENT & PLAN NOTE
Per notes, markedly confused on presentation with agitation  Patient remembers all events of previous night but cannot give good history  Admits to polysubstance abuse last night, likely etiology. CT head WNL. Lactic acidosis and mild CK elevation but otherwise no significant metabolic derangements.   Improving s/p administration of antipyschotic agent in ED.   Will continue to monitor throughout the day.
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
## 2019-12-02 NOTE — CONSULTS
Patient evaluated, lactic trending down. Encephalopathy markedly improved. Stable for admission to Hospital Medicine. Full consult note to follow.     Licha Mayers NP  Critical Care Medicine
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
## 2019-12-02 NOTE — PLAN OF CARE
Problem: Fall Injury Risk  Goal: Absence of Fall and Fall-Related Injury  Outcome: Ongoing, Progressing     Problem: Adult Inpatient Plan of Care  Goal: Plan of Care Review  Outcome: Ongoing, Progressing  Goal: Patient-Specific Goal (Individualization)  Outcome: Ongoing, Progressing  Goal: Absence of Hospital-Acquired Illness or Injury  Outcome: Ongoing, Progressing  Goal: Optimal Comfort and Wellbeing  Outcome: Ongoing, Progressing  Goal: Readiness for Transition of Care  Outcome: Ongoing, Progressing  Goal: Rounds/Family Conference  Outcome: Ongoing, Progressing    Pt awake and alert, able to make needs known. Calm and cooperative this shift. BP WNL this evening. Denies any pain. Sitter at bedside. Voiding without difficulties. Amb indep. Bolus LR infusing. Wctm.

## 2019-12-02 NOTE — ASSESSMENT & PLAN NOTE
--most likely drug induced from cocaine; sensorium markedly improved from prior reported  --no obvious source of infection; would not continue abx therapy at this time  --lactic trending down with IVF, TSH WNL

## 2019-12-02 NOTE — SUBJECTIVE & OBJECTIVE
"Past Medical History:   Diagnosis Date    Asthma     Hepatitis C     Hypertension     Varicose veins of both lower extremities        History reviewed. No pertinent surgical history.    Review of patient's allergies indicates:  No Known Allergies    No current facility-administered medications on file prior to encounter.      Current Outpatient Medications on File Prior to Encounter   Medication Sig    acetaminophen (TYLENOL) 650 MG TbSR Take 1 tablet (650 mg total) by mouth every 8 (eight) hours.    budesonide-formoterol 160-4.5 mcg (SYMBICORT) 160-4.5 mcg/actuation HFAA Inhale 2 puffs into the lungs every 12 (twelve) hours. Controller    ibuprofen (ADVIL,MOTRIN) 200 MG tablet Take 3 tablets (600 mg total) by mouth every 6 (six) hours as needed for Pain.    tamsulosin (FLOMAX) 0.4 mg Cap Take 0.4 mg by mouth once daily.    traMADol (ULTRAM) 50 mg tablet Take 1 tablet (50 mg total) by mouth every 6 (six) hours as needed for Pain.     Family History     Problem Relation (Age of Onset)    Cancer Mother    Heart disease Father        Tobacco Use    Smoking status: Current Every Day Smoker     Packs/day: 1.00     Years: 25.00     Pack years: 25.00     Types: Cigarettes    Smokeless tobacco: Never Used   Substance and Sexual Activity    Alcohol use: Yes     Comment: 3-4 beers every other day    Drug use: Yes     Types: "Crack" cocaine    Sexual activity: Not on file     Review of Systems   Constitutional: Negative for chills and fever.   HENT: Negative for sore throat and trouble swallowing.    Eyes: Positive for visual disturbance ("sometimes"). Negative for photophobia.   Respiratory: Negative for cough and shortness of breath.    Cardiovascular: Negative for chest pain, palpitations and leg swelling.   Gastrointestinal: Negative for abdominal distention, abdominal pain, constipation, diarrhea, nausea and vomiting.   Genitourinary: Negative for dysuria and hematuria.   Musculoskeletal: Negative for " "arthralgias and myalgias.   Skin: Negative for rash and wound.   Neurological: Negative for dizziness, syncope and headaches.   Psychiatric/Behavioral: Positive for agitation and confusion. Negative for hallucinations. The patient is nervous/anxious.      Objective:     Vital Signs (Most Recent):  Temp: 98.2 °F (36.8 °C) (12/02/19 0715)  Pulse: 95 (12/02/19 0800)  Resp: 16 (12/02/19 0800)  BP: (!) 136/90 (12/02/19 0800)  SpO2: 97 % (12/02/19 0800) Vital Signs (24h Range):  Temp:  [95.3 °F (35.2 °C)-98.2 °F (36.8 °C)] 98.2 °F (36.8 °C)  Pulse:  [] 95  Resp:  [15-16] 16  SpO2:  [95 %-98 %] 97 %  BP: (136-174)/(77-94) 136/90        There is no height or weight on file to calculate BMI.    Physical Exam   Constitutional:   Resting comfortably in bed, C-collar in place   HENT:   Mouth/Throat: Oropharynx is clear and moist.   No tongue laceration/abrasion   Eyes: No scleral icterus.   Injected sclerae   Neck: No thyromegaly present.   No C-spine tenderness to palpation. No pain with active or passive ROM.    Cardiovascular:   No murmur heard.  Regular tachycardia, rate about 100   Pulmonary/Chest: Effort normal and breath sounds normal. No respiratory distress.   Abdominal: Soft. Bowel sounds are normal. He exhibits no distension. There is no tenderness.   Musculoskeletal: He exhibits no edema or deformity.   Wasting to interdigital muscles of left hand   Lymphadenopathy:     He has no cervical adenopathy.   Neurological:   Awake and alert. Moving all extremities spontaneously. Oriented x 4. No facial droop   Skin: Skin is warm and dry.   Chronic appearing white hyper keratotic plaque to palmar surface of right hand.    Psychiatric:   Suspicious affect- repeatedly asks "what's really going on."  No hallucination.   Calm and cooperative   Nursing note and vitals reviewed.         "

## 2019-12-02 NOTE — ED NOTES
Pt now speaking appropriately, AAOX3, disoriented to situation. Pt is calm, cooperative. He advised he has had decreased intake over the last 2-3 days.

## 2019-12-02 NOTE — ASSESSMENT & PLAN NOTE
Admitted for management of psychosis, which is improving, lactic acidosis  Pending improvement in lactic acid, mental status, rescinding of PEC, will likely be discharge to home with outpatient substance abuse treatment.
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
## 2019-12-02 NOTE — ASSESSMENT & PLAN NOTE
ALCOHOL ABUSE  POLYSUBSTANCE ABUSE  COCAINE INTOXICATION DELIRIUM    Patient reports near daily alcohol consumption, crack cocaine usage. No history of withdrawals or seizures in the past from drugs or alcohol per patient.   Will screen for STI/blood associated diseases. No recent IV drug use (used in 90s)  Interested in cessation, previously quit 1 year ago  Psychosis markedly improved at time of my assessment, will likely rescind PEC today.   Counseled on cessation of alcohol, cocaine and tobacco, each for 3 minutes or more.   Prophylactic thiamine supplementation  Q4 CIWA

## 2019-12-03 VITALS
RESPIRATION RATE: 18 BRPM | TEMPERATURE: 98 F | HEIGHT: 73 IN | WEIGHT: 223.75 LBS | SYSTOLIC BLOOD PRESSURE: 146 MMHG | DIASTOLIC BLOOD PRESSURE: 80 MMHG | BODY MASS INDEX: 29.65 KG/M2 | HEART RATE: 78 BPM | OXYGEN SATURATION: 97 %

## 2019-12-03 PROBLEM — E53.8 B12 DEFICIENCY: Status: ACTIVE | Noted: 2019-12-03

## 2019-12-03 PROBLEM — B18.2 CHRONIC HEPATITIS C WITHOUT HEPATIC COMA: Status: ACTIVE | Noted: 2019-12-03

## 2019-12-03 LAB
ALBUMIN SERPL BCP-MCNC: 3.5 G/DL (ref 3.5–5.2)
ALP SERPL-CCNC: 84 U/L (ref 55–135)
ALT SERPL W/O P-5'-P-CCNC: 18 U/L (ref 10–44)
ANION GAP SERPL CALC-SCNC: 9 MMOL/L (ref 8–16)
AST SERPL-CCNC: 34 U/L (ref 10–40)
BASOPHILS # BLD AUTO: 0.03 K/UL (ref 0–0.2)
BASOPHILS NFR BLD: 0.5 % (ref 0–1.9)
BILIRUB SERPL-MCNC: 0.8 MG/DL (ref 0.1–1)
BUN SERPL-MCNC: 10 MG/DL (ref 6–20)
CALCIUM SERPL-MCNC: 9.2 MG/DL (ref 8.7–10.5)
CHLORIDE SERPL-SCNC: 108 MMOL/L (ref 95–110)
CO2 SERPL-SCNC: 26 MMOL/L (ref 23–29)
CREAT SERPL-MCNC: 1 MG/DL (ref 0.5–1.4)
DIFFERENTIAL METHOD: ABNORMAL
EOSINOPHIL # BLD AUTO: 0.3 K/UL (ref 0–0.5)
EOSINOPHIL NFR BLD: 4.7 % (ref 0–8)
ERYTHROCYTE [DISTWIDTH] IN BLOOD BY AUTOMATED COUNT: 10.8 % (ref 11.5–14.5)
EST. GFR  (AFRICAN AMERICAN): >60 ML/MIN/1.73 M^2
EST. GFR  (NON AFRICAN AMERICAN): >60 ML/MIN/1.73 M^2
FOLATE SERPL-MCNC: 9.5 NG/ML (ref 4–24)
GLUCOSE SERPL-MCNC: 109 MG/DL (ref 70–110)
HCT VFR BLD AUTO: 38 % (ref 40–54)
HGB BLD-MCNC: 13 G/DL (ref 14–18)
IMM GRANULOCYTES # BLD AUTO: 0.01 K/UL (ref 0–0.04)
IMM GRANULOCYTES NFR BLD AUTO: 0.2 % (ref 0–0.5)
LYMPHOCYTES # BLD AUTO: 1.4 K/UL (ref 1–4.8)
LYMPHOCYTES NFR BLD: 26.1 % (ref 18–48)
MAGNESIUM SERPL-MCNC: 2.1 MG/DL (ref 1.6–2.6)
MCH RBC QN AUTO: 31.9 PG (ref 27–31)
MCHC RBC AUTO-ENTMCNC: 34.2 G/DL (ref 32–36)
MCV RBC AUTO: 93 FL (ref 82–98)
MONOCYTES # BLD AUTO: 0.7 K/UL (ref 0.3–1)
MONOCYTES NFR BLD: 12.3 % (ref 4–15)
NEUTROPHILS # BLD AUTO: 3.1 K/UL (ref 1.8–7.7)
NEUTROPHILS NFR BLD: 56.2 % (ref 38–73)
NRBC BLD-RTO: 0 /100 WBC
PHOSPHATE SERPL-MCNC: 2.9 MG/DL (ref 2.7–4.5)
PLATELET # BLD AUTO: 174 K/UL (ref 150–350)
PMV BLD AUTO: 10.5 FL (ref 9.2–12.9)
POTASSIUM SERPL-SCNC: 4.3 MMOL/L (ref 3.5–5.1)
PROT SERPL-MCNC: 6.6 G/DL (ref 6–8.4)
RBC # BLD AUTO: 4.07 M/UL (ref 4.6–6.2)
SODIUM SERPL-SCNC: 143 MMOL/L (ref 136–145)
VIT B12 SERPL-MCNC: 195 PG/ML (ref 210–950)
WBC # BLD AUTO: 5.51 K/UL (ref 3.9–12.7)

## 2019-12-03 PROCEDURE — 83735 ASSAY OF MAGNESIUM: CPT

## 2019-12-03 PROCEDURE — 84100 ASSAY OF PHOSPHORUS: CPT

## 2019-12-03 PROCEDURE — 36415 COLL VENOUS BLD VENIPUNCTURE: CPT

## 2019-12-03 PROCEDURE — 63600175 PHARM REV CODE 636 W HCPCS: Performed by: STUDENT IN AN ORGANIZED HEALTH CARE EDUCATION/TRAINING PROGRAM

## 2019-12-03 PROCEDURE — 80053 COMPREHEN METABOLIC PANEL: CPT

## 2019-12-03 PROCEDURE — 82746 ASSAY OF FOLIC ACID SERUM: CPT

## 2019-12-03 PROCEDURE — 25000003 PHARM REV CODE 250: Performed by: STUDENT IN AN ORGANIZED HEALTH CARE EDUCATION/TRAINING PROGRAM

## 2019-12-03 PROCEDURE — 99238 HOSP IP/OBS DSCHRG MGMT 30/<: CPT | Mod: ,,, | Performed by: HOSPITALIST

## 2019-12-03 PROCEDURE — 99238 PR HOSPITAL DISCHARGE DAY,<30 MIN: ICD-10-PCS | Mod: ,,, | Performed by: HOSPITALIST

## 2019-12-03 PROCEDURE — 85025 COMPLETE CBC W/AUTO DIFF WBC: CPT

## 2019-12-03 PROCEDURE — 82607 VITAMIN B-12: CPT

## 2019-12-03 RX ORDER — LOSARTAN POTASSIUM 25 MG/1
25 TABLET ORAL DAILY
Status: DISCONTINUED | OUTPATIENT
Start: 2019-12-03 | End: 2019-12-03 | Stop reason: HOSPADM

## 2019-12-03 RX ORDER — CYANOCOBALAMIN 1000 UG/ML
1000 INJECTION, SOLUTION INTRAMUSCULAR; SUBCUTANEOUS WEEKLY
Qty: 3 ML | Refills: 0 | Status: SHIPPED | OUTPATIENT
Start: 2019-12-10 | End: 2019-12-03 | Stop reason: HOSPADM

## 2019-12-03 RX ORDER — LOSARTAN POTASSIUM 25 MG/1
25 TABLET ORAL DAILY
Qty: 90 TABLET | Refills: 3 | Status: SHIPPED | OUTPATIENT
Start: 2019-12-03 | End: 2020-08-29

## 2019-12-03 RX ORDER — CYANOCOBALAMIN 1000 UG/ML
1000 INJECTION, SOLUTION INTRAMUSCULAR; SUBCUTANEOUS WEEKLY
Status: DISCONTINUED | OUTPATIENT
Start: 2019-12-03 | End: 2019-12-03 | Stop reason: HOSPADM

## 2019-12-03 RX ADMIN — LOSARTAN POTASSIUM 25 MG: 25 TABLET ORAL at 10:12

## 2019-12-03 RX ADMIN — Medication 100 MG: at 09:12

## 2019-12-03 RX ADMIN — CYANOCOBALAMIN 1000 MCG: 1000 INJECTION, SOLUTION INTRAMUSCULAR; SUBCUTANEOUS at 09:12

## 2019-12-03 NOTE — HOSPITAL COURSE
Admitted for acute cocaine intoxication with psychosis. Improved rapidly after 2 doses of geodon. Required no further antipsychotics after leaving ED. Psychosis resolved day of admission and PEC placed in ED rescinded.     Chronic alcohol use- no signs of withdrawal, CIWA 2 24 hours after admission. No history of withdrawals.     Provided with resources regarding substance abuse counseling.     B12 deficiency- supplemented IM x 1. Advised to take OTC B12 supplementation    HCV- chronic. advised to follow up with PCP    HTN- suspect essential HTN undiagnosed. Started on Losartan 25mg. Follow up with PCP.
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
## 2019-12-03 NOTE — DISCHARGE INSTRUCTIONS
Take OTC B12 1000mcg daily.     Get established with PCP to follow up blood pressure, hepatitis C, B12 deficiency, substance abuse    Follow up with outpatient rehab services for help with stopping alcohol, cocaine.     Quit smoking

## 2019-12-03 NOTE — PLAN OF CARE
12/03/19 1050   Post-Acute Status   Post-Acute Authorization Other   Other Status No Post-Acute Service Needs  (Substance abuse resource list provided to pt)   Discharge Delays None known at this time     SW informed pt will d/c today, no needs; sw provided pt with substance abuse resource list. Pt will need transportation home. SW updated nurse and ride ordered for a 1pm .    SW informed  time for transport is 2pm.    No additional sw needs.    Jessica Brown LCSW

## 2019-12-03 NOTE — PLAN OF CARE
12/03/19 1419   Final Note   Assessment Type Final Discharge Note   Anticipated Discharge Disposition Home   What phone number can be called within the next 1-3 days to see how you are doing after discharge? [phone]        12/03/19 1419   Final Note   Assessment Type Final Discharge Note   Anticipated Discharge Disposition Home   What phone number can be called within the next 1-3 days to see how you are doing after discharge? [phone]     Follow-up With  Details  Why  Contact Info   [facility] - Internal Medicine  Schedule an appointment as soon as possible for a visit    [address]  [facility] [id number] [phone]   [facility]    patient to schedule follow up when able to verify transportation availability  [address]  [facility] [address]  [phone]

## 2019-12-03 NOTE — NURSING
Pt discharged home with [facility] transport. Iv removed prior to discharge. Discharge instructions given to pt, pt verbalized understanding. Pt left in stable condition.

## 2019-12-07 LAB
BACTERIA BLD CULT: NORMAL
BACTERIA BLD CULT: NORMAL

## 2020-08-29 ENCOUNTER — HOSPITAL ENCOUNTER (EMERGENCY)
Facility: HOSPITAL | Age: 60
Discharge: HOME | End: 2020-08-29
Attending: EMERGENCY MEDICINE
Payer: MEDICAID

## 2020-08-29 VITALS
WEIGHT: 242.94 LBS | SYSTOLIC BLOOD PRESSURE: 181 MMHG | HEART RATE: 84 BPM | DIASTOLIC BLOOD PRESSURE: 87 MMHG | TEMPERATURE: 99 F | RESPIRATION RATE: 18 BRPM | BODY MASS INDEX: 31.18 KG/M2 | OXYGEN SATURATION: 99 % | HEIGHT: 74 IN

## 2020-08-29 DIAGNOSIS — M25.561 RIGHT KNEE PAIN: ICD-10-CM

## 2020-08-29 PROCEDURE — 99284 EMERGENCY DEPT VISIT MOD MDM: CPT | Mod: ,,, | Performed by: EMERGENCY MEDICINE

## 2020-08-29 PROCEDURE — 99284 PR EMERGENCY DEPT VISIT,LEVEL IV: ICD-10-PCS | Mod: ,,, | Performed by: EMERGENCY MEDICINE

## 2020-08-29 PROCEDURE — 99284 EMERGENCY DEPT VISIT MOD MDM: CPT | Mod: 25

## 2020-08-29 PROCEDURE — 25000003 PHARM REV CODE 250: Performed by: EMERGENCY MEDICINE

## 2020-08-29 RX ORDER — ACETAMINOPHEN 325 MG/1
650 TABLET ORAL EVERY 6 HOURS PRN
Qty: 30 TABLET | Refills: 0 | OUTPATIENT
Start: 2020-08-29 | End: 2020-09-04

## 2020-08-29 RX ORDER — ACETAMINOPHEN 500 MG
1000 TABLET ORAL
Status: COMPLETED | OUTPATIENT
Start: 2020-08-29 | End: 2020-08-29

## 2020-08-29 RX ORDER — OXYCODONE HYDROCHLORIDE 5 MG/1
5 TABLET ORAL EVERY 6 HOURS PRN
Qty: 6 TABLET | Refills: 0 | Status: SHIPPED | OUTPATIENT
Start: 2020-08-29 | End: 2022-08-12 | Stop reason: CLARIF

## 2020-08-29 RX ADMIN — ACETAMINOPHEN 1000 MG: 500 TABLET ORAL at 05:08

## 2020-08-29 NOTE — ED TRIAGE NOTES
"+ right sided knee pain radiating to right thigh x four days. 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 Denies numbness/tingling. Pt reports taking OTC Motrin last PM w/ minimal relief.   "

## 2020-08-29 NOTE — ED PROVIDER NOTES
"Encounter Date: 8/29/2020       History     Chief Complaint   Patient presents with    Leg Pain     swelling above r knee     HPI   60-year-old male with past history of hypertension, asthma, hep C, varicose veins, coming in with 3 days of right knee pain. Pain is moderate or worse with bending down.Patient states he works at [employer] and often has to be on his knees for work, carrying boxes and chicken, and has been having worsening right knee pain.  He has had right knee pain in the past but usually improves with rest and time.  This time is taken multiple NSAIDs including 800mg every 6 hours for 3 days with some improvement in the swelling but no resolution of pain.  Denies falls or trauma.  Denies fevers.  No pain in the thigh or calf.  No weakness numbness.  No other joint pain.    Denies hearing any pops or clicks in his knee.    Review of patient's allergies indicates:  No Known Allergies  Past Medical History:   Diagnosis Date    Asthma     Hepatitis C     Hypertension     Varicose veins of both lower extremities      History reviewed. No pertinent surgical history.  Family History   Problem Relation Age of Onset    Cancer Mother     Heart disease Father      Social History     Tobacco Use    Smoking status: Current Every Day Smoker     Packs/day: 1.00     Years: 25.00     Pack years: 25.00     Types: Cigarettes    Smokeless tobacco: Never Used   Substance Use Topics    Alcohol use: Yes     Comment: 3-4 beers every other day    Drug use: Yes     Types: "Crack" cocaine     Review of Systems  Constitutional:  No Fever,  Eyes: No Vision Changes  ENT/Mouth: No sore throat,  Cardiovascular:  No Chest Pain  Respiratory:  No Cough, No SOB  Gastrointestinal: No abdo pain.  Musculoskeletal:  Positive right knee pain No Back Pain, No Neck Pain, No recent trauma.  Skin:  No skin Lesions  Neuro:  No Weakness, No Numbness, No Paresthesias        Physical Exam     Initial Vitals [08/29/20 1643]   BP Pulse Resp " Temp SpO2   (!) 192/85 80 18 98.2 °F (36.8 °C) 95 %      MAP       --         Physical Exam  Physical Exam:  CONSTITUTIONAL: Well developed, well nourished, in no acute distress, calm  HENT: Normocephalic, atraumatic    EYES: Sclerae anicteric   NECK: Supple  RESPIRATORY: Breathing comfortably. Speaking in full sentences   NEUROLOGIC: Alert, interacting normally. No facial droop.   MSK:  Right knee is mildly swollen, no focal tenderness, normal range of motion, no erythema.  There is varicose veins on the right calf with no tenderness.  Distally foot is neurovascularly intact.  No tenderness to the thigh.  No lateral or anterior posterior ligamentous laxity to the knee.  Moving all four extremities.  Skin: Warm and dry. No visible rash on exposed areas of skin.    Psych: Mood and affect normal.       ED Course   Procedures  Labs Reviewed - No data to display       Imaging Results    None          Medical Decision Making:   History:   Old Medical Records: I decided to obtain old medical records.    60-year-old male with past medical history as noted coming in with 3 days of right knee pain, in the context of frequent time spent on knees at his work.  On exam the knee is mildly swollen but normal range of motion, no tenderness, no redness, distally neurovascular intact in that leg.  No calf or thigh tenderness.  No ligamentous laxity.    Given his normal range of motion and lack of significant tenderness, lack of fevers, lack of risk factors for septic arthritis at this time not consistent with septic arthritis.    No signs of bursitis.    No direct trauma to the knee, no tenderness, this is not consistent bony fracture.  However will obtain x-ray to characterize bones well to look for arthritic changes.    No obvious tendinous injury although this remains possible.    Controlled symptoms with dose of Tylenol.     Patient has been taking extensive amounts of NSAIDs and I advised him to stop given the amount and  potential complications with kidneys and GI.    If x-ray is negative, anticipate discharge home with Tylenol.  Instructions to rest knee.  Will advise to hold NSAIDs for now.  Outpatient follow-up and return precautions.    Update:  X-rays negative for fracture.  There are degenerative changes.  Will discharge with Tylenol, very short course of oxycodone for breakthrough pain.  Denies patient is a rest his knee, avoid walking long distances or spending time on his knee.  Not consistent with acutely dangerous pathology at this time.  Safe for outpatient follow-up.    Advised that if he is not feeling improved in 1 week he should follow-up with his primary care doctor is you may need an outpatient MRI.  I explained that x-rays do not see ligamentous or menisci or tendons well.    BP noted.  Patient to follow-up with his primary care doctor for repeat blood pressure measurements.    Findings of ED work up explained to patient. Patient agrees with discharge plan and verbalizes understanding of return precautions.     MDM Complexity Points:   Problem Points:  1.Self-limited or minor (maximum of 2) and New problem, with additional ED work-up planned - minor- elevated blood pressure, you problem -  right knee pain    Data Points:  Review or order radiology test and Decision to obtain old records (in the EHR)                                             Clinical Impression:       ICD-10-CM ICD-9-CM   1. Right knee pain  M25.561 719.46                                Zain Shah MD  08/29/20 2047       Zain Shah MD  08/29/20 2048

## 2020-08-29 NOTE — ED NOTES
Two patient identifiers have been checked and are correct.      Appearance: Pt awake, alert & oriented to person, place & time. Pt in no acute distress at present time. Pt is clean and well groomed with clothes appropriately fastened.   Skin: Skin warm, dry & intact. Color consistent with ethnicity. Mucous membranes moist. No breakdown or brusing noted.   Musculoskeletal: Patient moving all extremities well, no obvious swelling or deformities noted. + right sided knee pain.   Respiratory: Respirations spontaneous, even, and non-labored. Visible chest rise noted. Airway is open and patent. No accessory muscle use noted.   Neurologic: Sensation is intact. Speech is clear and appropriate. Eyes open spontaneously, behavior appropriate to situation, follows commands, facial expression symmetrical, bilateral hand grasp equal and even, purposeful motor response noted.  Cardiac: All peripheral pulses present. No Bilateral lower extremity edema. Cap refill is <3 seconds.

## 2020-08-29 NOTE — ED NOTES
DR Shah at bedside. To discuss test results , plan of care and dc instrx w/ pt- states understands.

## 2020-08-29 NOTE — DISCHARGE INSTRUCTIONS
Your x-ray showed degenerative changes consistent with arthritis.  No broken bones.    It is likely that your knee pain is from overuse with arthritis.  Take Tylenol as directed for pain control.  Given that you are taking a large amount of ibuprofen I recommend stopping that for at least 1 week to give your stomach a chance to recover.     You were also prescribed a very short course oxycodone which you can take for breakthrough pain.    It is also important for you to rest your knee for several days.  You may walk but do not strain your knee, do not get down onto your knee, do not carry heavy objects.    If your pain is not resolved please follow-up with your primary care doctor for continued evaluation.  You may need an MRI of her knee to look for any other injuries that are not visible on x-ray.    Your blood pressure was elevated today.  Please follow-up with your primary care doctor for repeat blood pressure measurements.  You may need to be treated for high blood pressure.

## 2020-09-04 ENCOUNTER — HOSPITAL ENCOUNTER (EMERGENCY)
Facility: HOSPITAL | Age: 60
Discharge: HOME | End: 2020-09-04
Attending: EMERGENCY MEDICINE
Payer: MEDICAID

## 2020-09-04 VITALS
HEIGHT: 74 IN | HEART RATE: 87 BPM | TEMPERATURE: 98 F | SYSTOLIC BLOOD PRESSURE: 152 MMHG | OXYGEN SATURATION: 96 % | RESPIRATION RATE: 16 BRPM | BODY MASS INDEX: 31.06 KG/M2 | DIASTOLIC BLOOD PRESSURE: 78 MMHG | WEIGHT: 242 LBS

## 2020-09-04 DIAGNOSIS — M71.21 BAKER'S CYST OF KNEE, RIGHT: Primary | ICD-10-CM

## 2020-09-04 DIAGNOSIS — M79.606 LEG PAIN: ICD-10-CM

## 2020-09-04 PROCEDURE — 25000003 PHARM REV CODE 250: Performed by: EMERGENCY MEDICINE

## 2020-09-04 PROCEDURE — 99284 EMERGENCY DEPT VISIT MOD MDM: CPT | Mod: 25

## 2020-09-04 PROCEDURE — 99283 PR EMERGENCY DEPT VISIT,LEVEL III: ICD-10-PCS | Mod: ,,, | Performed by: EMERGENCY MEDICINE

## 2020-09-04 PROCEDURE — 99283 EMERGENCY DEPT VISIT LOW MDM: CPT | Mod: ,,, | Performed by: EMERGENCY MEDICINE

## 2020-09-04 RX ORDER — IBUPROFEN 400 MG/1
400 TABLET ORAL EVERY 6 HOURS PRN
Qty: 20 TABLET | Refills: 0 | OUTPATIENT
Start: 2020-09-04 | End: 2021-11-17

## 2020-09-04 RX ORDER — ACETAMINOPHEN 325 MG/1
650 TABLET ORAL EVERY 6 HOURS PRN
Qty: 30 TABLET | Refills: 0 | OUTPATIENT
Start: 2020-09-04 | End: 2020-12-25

## 2020-09-04 RX ORDER — OXYCODONE HYDROCHLORIDE 5 MG/1
5 TABLET ORAL
Status: COMPLETED | OUTPATIENT
Start: 2020-09-04 | End: 2020-09-04

## 2020-09-04 RX ADMIN — OXYCODONE 5 MG: 5 TABLET ORAL at 07:09

## 2020-09-04 NOTE — ED TRIAGE NOTES
Pt to the ER with complaints of right knee pain and after kneeling on concrete approximately 5 days ago. Pt was seen at this ER on 8/28 for the same complaints; reporting no relief in pain.

## 2020-09-04 NOTE — ED PROVIDER NOTES
"Encounter Date: 9/4/2020    SCRIBE #1 NOTE: I, Jamal Cruz, am scribing for, and in the presence of,  Zain Shah MD. I have scribed the entire note. Other sections scribed: HPI ROS.       History     Chief Complaint   Patient presents with    Leg Pain     right leg pain and swelling     HPI   Mr. Small is a 60 y.o. male with a past medical history of asthma, Hepatitis C, HTN, and varicose veins of both lower extremities, who presents to the ED with right knee swelling and pain since 5 days ago. He was seen in the ED on 8/28/2020 for a similar episode where he was prescribed a pain medication. He states that the swelling is worse this time. He states that when he stands and bends his right knee, there is a fullness/tightness in the posterior aspect. He took one 800 mg pill of tylenol or ibuprofen around 3:30pm today. He states that he frequently kneels on concrete at his work. He denies chest pain, shortness of breath, and abdominal pain. He smokes cigarettes.     The history is provided by the patient and medical records.    Review of patient's allergies indicates:  No Known Allergies  Past Medical History:   Diagnosis Date    Asthma     Hepatitis C     Hypertension     Varicose veins of both lower extremities      History reviewed. No pertinent surgical history.  Family History   Problem Relation Age of Onset    Cancer Mother     Heart disease Father      Social History     Tobacco Use    Smoking status: Current Every Day Smoker     Packs/day: 1.00     Years: 25.00     Pack years: 25.00     Types: Cigarettes    Smokeless tobacco: Never Used   Substance Use Topics    Alcohol use: Yes     Comment: 3-4 beers every other day    Drug use: Yes     Types: "Crack" cocaine     Comment: crack     Review of Systems   Constitutional:  No Fever  Eyes: No Vision Changes  ENT/Mouth: No sore throat  Cardiovascular:  No Chest Pain, No Palpitations  Respiratory:  No Cough, No SOB  Gastrointestinal:  No Nausea, No " Vomiting, No Diarrhea, No abdo pain.  Genitourinary:  No  pain, No dysuria   Musculoskeletal:  Positive for right knee pain and swelling, No Back Pain, No Neck Pain, No recent trauma.  Skin:  No skin Lesions  Neuro:  No Weakness, No Numbness, No Paresthesias, No Dizziness, No Headache    Physical Exam     Initial Vitals [09/04/20 1630]   BP Pulse Resp Temp SpO2   (!) 152/78 87 18 98.4 °F (36.9 °C) 96 %      MAP       --         Physical Exam    Vitals reviewed.    Physical Exam:  CONSTITUTIONAL: Well developed, well nourished, in no acute distress, calm  HENT: Normocephalic, atraumatic    EYES: Sclerae anicteric   NECK: Supple  RESPIRATORY: Breathing comfortably. Speaking in full sentences   NEUROLOGIC: Alert, interacting normally. No facial droop.   MSK:  Right lower leg with no significant pretibial edema or calf edema or tenderness.  Patient has some tenderness and swelling in the posterior to the knee in the popliteal fossa.  There is fullness of the fossa.  There is no significant joint effusion, there is normal range of motion at the knee.  There is no significant skin changes of the knee.  There is no focal bony tenderness to the anterior part of the knee.  Thigh is also soft with no skin changes and no swelling. LLE with no swelling. Moving all four extremities.  Skin: Warm and dry. No visible rash on exposed areas of skin.    Psych: Mood and affect normal.       ED Course   Procedures  Labs Reviewed - No data to display       Imaging Results          US Lower Extremity Veins Right (Final result)  Result time 09/04/20 19:08:17    Final result by Cristofer Masterson MD (09/04/20 19:08:17)                 Impression:      No evidence of deep venous thrombosis in the right lower extremity.    Large right Baker's cyst measuring 3.9 x 1.3 x 2.9 cm.    Electronically signed by resident: Giovanni Morales  Date:    09/04/2020  Time:    18:57    Electronically signed by: Cristofer Masterson MD  Date:    09/04/2020  Time:    19:08              Narrative:    EXAMINATION:  US LOWER EXTREMITY VEINS RIGHT    CLINICAL HISTORY:  Pain in leg, unspecified    TECHNIQUE:  Duplex and color flow Doppler evaluation and graded compression of the right lower extremity veins was performed.    COMPARISON:  No relevant prior imaging for comparison.    FINDINGS:  Right thigh veins: The common femoral, femoral, popliteal, and upper greater saphenous veins are patent and free of thrombus. The veins are normally compressible and have normal phasic flow and augmentation response.    Right calf veins: The visualized calf veins are patent.    Contralateral CFV: The contralateral (left) common femoral vein is patent and free of thrombus.    Miscellaneous: There is a large cystic appearing lesion in the right popliteal fossa measuring 3.9 x 1.3 x 2.9 cm, consistent with a Baker's cyst                                 Medical Decision Making:   History:   Old Medical Records: I decided to obtain old medical records.  Clinical Tests:   Radiological Study: Ordered and Reviewed    60-year-old male with past medical history as noted coming in for repeat visit for right-sided leg pain.  I saw this patient on 8/29 x-ray showed degenerative changes, he was discharged with symptomatic control.  He states that he continues to have right-sided knee pain.  Of note he works at [employer] and spends time on his knees as well as carries heavy boxes.  This was thought to be the culprit at the time.    On exam today there is no signs of edema in his legs, this not consistent with heart failure.  There is no edema or pain or tenderness to the left leg.  There is fullness in the popliteal fossa which makes me suspicious for Baker cyst.  Given his return visit will obtain ultrasound of the right lower extremity to look for DVT versus Baker cyst.  No new trauma, no focal tenderness, no indication for repeat x-rays this time.  Patient took Tylenol or ibuprofen right before coming here is unsure  which 1.    19:53 US shows no DVT but a large right Baker's cyst, which expains his symptoms. Will continue pain contorl and follow up with orthopedics. Not consistent with a dangerous medical condition at this time.    Findings of ED work up explained to patient. Patient agrees with discharge plan and verbalizes understanding of return precautions.     MDM Complexity Points:   Problem Points:  1.New problem, with no additional ED work-up planned (maximum of 1) -    Data Points:  Review or order radiology test, Decision to obtain old records (in the EHR) and Review and summarization of old records                Scribe Attestation:   Scribe #1: I performed the above scribed service and the documentation accurately describes the services I performed. I attest to the accuracy of the note.    Attending Attestation:           Physician Attestation for Scribe:  Physician Attestation Statement for Scribe #1: I, reviewed documentation, as scribed by Jamal Cruz in my presence, and it is both accurate and complete.                               Clinical Impression:       ICD-10-CM ICD-9-CM   1. Baker's cyst of knee, right  M71.21 727.51   2. Leg pain  M79.606 729.5         Disposition:   Disposition: Discharged  Condition: Stable     ED Disposition Condition    Discharge Stable        ED Prescriptions     Medication Sig Dispense Start Date End Date Auth. Provider    acetaminophen (TYLENOL) 325 MG tablet Take 2 tablets (650 mg total) by mouth every 6 (six) hours as needed. 30 tablet 9/4/2020  Zain Shah MD    ibuprofen (ADVIL,MOTRIN) 400 MG tablet Take 1 tablet (400 mg total) by mouth every 6 (six) hours as needed. With food. 20 tablet 9/4/2020  Zain Shah MD        Follow-up Information    None                                    Zain Shah MD  09/05/20 6100

## 2020-09-04 NOTE — ED NOTES
LOC: The patient is awake, alert, and oriented to place, time, situation. Affect is appropriate.  Speech is appropriate and clear.     APPEARANCE: Patient resting comfortably in no acute distress.  Patient is clean and well groomed.    SKIN: The skin is warm and dry; color consistent with ethnicity.  Patient has normal skin turgor and moist mucus membranes.  Skin intact; no breakdown or bruising noted.     MUSCULOSKELETAL: Patient moving upper and lower extremities without difficulty but complains of pain and swelling to the right knee.  Denies weakness.     RESPIRATORY: Airway is open and patent. Respirations spontaneous, even, easy, and non-labored.  Patient has a normal effort and rate.  No accessory muscle use noted. Denies cough.     CARDIAC:  Normal rate noted.  No peripheral edema noted. No complaints of chest pain.      ABDOMEN: Soft and non tender to palpation.  No distention noted.     NEUROLOGIC: Eyes open spontaneously.  Behavior appropriate to situation.  Follows commands; facial expression symmetrical.  Purposeful motor response noted; normal sensation in all extremities.

## 2020-09-05 NOTE — DISCHARGE INSTRUCTIONS
Your right knee has a large Baker cyst which is explaining your symptoms.  Please follow-up with the Orthopedic Department for continued management of your Baker's cyst.  Sometimes they will inject steroids and or drain the cyst for symptomatic control.  This is not done in the emergency department.    Were discharged with Tylenol, Motrin for pain control.  At this time were avoiding a prescription for more narcotics, given that this may be coming chronic knee symptom, and narcotics for chronic pain will likely worsen pain over the long-term.    If you would like to follow up with the [facility] for further care of your fracture, please call the [facility] Scheduling Department at [phone] during business hours.  Please let the  know you need a fracture follow-up appointment with Orthopedics, and you will be scheduled in the Orthopedic Clinic.  Please bring your original Emergency Department discharge papers with  you to the clinic appointment.

## 2020-10-12 ENCOUNTER — OCCUPATIONAL HEALTH (OUTPATIENT)
Dept: URGENT CARE | Facility: CLINIC | Age: 60
End: 2020-10-12

## 2020-10-12 VITALS
BODY MASS INDEX: 31.01 KG/M2 | DIASTOLIC BLOOD PRESSURE: 74 MMHG | HEART RATE: 94 BPM | HEIGHT: 73 IN | SYSTOLIC BLOOD PRESSURE: 140 MMHG | WEIGHT: 234 LBS | OXYGEN SATURATION: 97 % | TEMPERATURE: 98 F

## 2020-10-12 DIAGNOSIS — Z02.1 PRE-EMPLOYMENT EXAMINATION: Primary | ICD-10-CM

## 2020-10-12 DIAGNOSIS — Z02.83 ENCOUNTER FOR DRUG SCREENING: Primary | ICD-10-CM

## 2020-10-12 PROCEDURE — 99499 UNLISTED E&M SERVICE: CPT | Mod: S$GLB,,, | Performed by: EMERGENCY MEDICINE

## 2020-10-12 PROCEDURE — 99199 OCC MED MRO FEE: ICD-10-PCS | Mod: S$GLB,,, | Performed by: NURSE PRACTITIONER

## 2020-10-12 PROCEDURE — 80305 DRUG TEST PRSMV DIR OPT OBS: CPT | Mod: S$GLB,,, | Performed by: EMERGENCY MEDICINE

## 2020-10-12 PROCEDURE — 80305 PR COLLECTION ONLY DRUG SCREEN: ICD-10-PCS | Mod: S$GLB,,, | Performed by: EMERGENCY MEDICINE

## 2020-10-12 PROCEDURE — 99199 UNLISTED SPECIAL SVC PX/RPRT: CPT | Mod: S$GLB,,, | Performed by: NURSE PRACTITIONER

## 2020-10-12 PROCEDURE — 99499 PHYSICAL, BASIC COMPLEXITY: ICD-10-PCS | Mod: S$GLB,,, | Performed by: EMERGENCY MEDICINE

## 2020-10-12 RX ORDER — TAMSULOSIN HYDROCHLORIDE 0.4 MG/1
0.4 CAPSULE ORAL DAILY
COMMUNITY
End: 2023-03-06 | Stop reason: SDUPTHER

## 2020-10-12 RX ORDER — HYDROCHLOROTHIAZIDE 12.5 MG/1
12.5 CAPSULE ORAL DAILY
COMMUNITY
End: 2022-04-11 | Stop reason: SDUPTHER

## 2020-12-25 ENCOUNTER — HOSPITAL ENCOUNTER (EMERGENCY)
Facility: HOSPITAL | Age: 60
Discharge: HOME | End: 2020-12-25
Attending: EMERGENCY MEDICINE
Payer: MEDICAID

## 2020-12-25 VITALS
RESPIRATION RATE: 18 BRPM | HEIGHT: 73 IN | SYSTOLIC BLOOD PRESSURE: 213 MMHG | TEMPERATURE: 98 F | HEART RATE: 80 BPM | OXYGEN SATURATION: 98 % | DIASTOLIC BLOOD PRESSURE: 100 MMHG | BODY MASS INDEX: 31.01 KG/M2 | WEIGHT: 234 LBS

## 2020-12-25 DIAGNOSIS — I10 HYPERTENSION, UNSPECIFIED TYPE: ICD-10-CM
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
## 2020-12-25 DIAGNOSIS — F19.94 SUBSTANCE INDUCED MOOD DISORDER: ICD-10-CM

## 2020-12-25 LAB
ALBUMIN SERPL BCP-MCNC: 4.5 G/DL (ref 3.5–5.2)
ALP SERPL-CCNC: 78 U/L (ref 55–135)
ALT SERPL W/O P-5'-P-CCNC: 22 U/L (ref 10–44)
AMPHET+METHAMPHET UR QL: NEGATIVE
ANION GAP SERPL CALC-SCNC: 15 MMOL/L (ref 8–16)
APAP SERPL-MCNC: <3 UG/ML (ref 10–20)
AST SERPL-CCNC: 31 U/L (ref 10–40)
BARBITURATES UR QL SCN>200 NG/ML: NEGATIVE
BASOPHILS # BLD AUTO: 0.03 K/UL (ref 0–0.2)
BASOPHILS NFR BLD: 0.6 % (ref 0–1.9)
BENZODIAZ UR QL SCN>200 NG/ML: NEGATIVE
BILIRUB SERPL-MCNC: 0.4 MG/DL (ref 0.1–1)
BILIRUB UR QL STRIP: NEGATIVE
BUN SERPL-MCNC: 12 MG/DL (ref 6–20)
BZE UR QL SCN: NORMAL
CALCIUM SERPL-MCNC: 9.1 MG/DL (ref 8.7–10.5)
CANNABINOIDS UR QL SCN: NEGATIVE
CHLORIDE SERPL-SCNC: 106 MMOL/L (ref 95–110)
CLARITY UR REFRACT.AUTO: CLEAR
CO2 SERPL-SCNC: 20 MMOL/L (ref 23–29)
COLOR UR AUTO: ABNORMAL
CREAT SERPL-MCNC: 1.1 MG/DL (ref 0.5–1.4)
CREAT UR-MCNC: 35 MG/DL (ref 23–375)
CTP QC/QA: YES
DIFFERENTIAL METHOD: ABNORMAL
EOSINOPHIL # BLD AUTO: 0 K/UL (ref 0–0.5)
EOSINOPHIL NFR BLD: 0.4 % (ref 0–8)
ERYTHROCYTE [DISTWIDTH] IN BLOOD BY AUTOMATED COUNT: 11.1 % (ref 11.5–14.5)
EST. GFR  (AFRICAN AMERICAN): >60 ML/MIN/1.73 M^2
EST. GFR  (NON AFRICAN AMERICAN): >60 ML/MIN/1.73 M^2
ETHANOL SERPL-MCNC: 148 MG/DL
GLUCOSE SERPL-MCNC: 107 MG/DL (ref 70–110)
GLUCOSE UR QL STRIP: NEGATIVE
HCT VFR BLD AUTO: 42.8 % (ref 40–54)
HGB BLD-MCNC: 13.5 G/DL (ref 14–18)
HGB UR QL STRIP: ABNORMAL
IMM GRANULOCYTES # BLD AUTO: 0.01 K/UL (ref 0–0.04)
IMM GRANULOCYTES NFR BLD AUTO: 0.2 % (ref 0–0.5)
KETONES UR QL STRIP: NEGATIVE
LEUKOCYTE ESTERASE UR QL STRIP: NEGATIVE
LYMPHOCYTES # BLD AUTO: 1.7 K/UL (ref 1–4.8)
LYMPHOCYTES NFR BLD: 31.7 % (ref 18–48)
MCH RBC QN AUTO: 30.9 PG (ref 27–31)
MCHC RBC AUTO-ENTMCNC: 31.5 G/DL (ref 32–36)
MCV RBC AUTO: 98 FL (ref 82–98)
METHADONE UR QL SCN>300 NG/ML: NEGATIVE
MICROSCOPIC COMMENT: NORMAL
MONOCYTES # BLD AUTO: 0.6 K/UL (ref 0.3–1)
MONOCYTES NFR BLD: 11.7 % (ref 4–15)
NEUTROPHILS # BLD AUTO: 2.9 K/UL (ref 1.8–7.7)
NEUTROPHILS NFR BLD: 55.4 % (ref 38–73)
NITRITE UR QL STRIP: NEGATIVE
NRBC BLD-RTO: 0 /100 WBC
OPIATES UR QL SCN: NEGATIVE
PCP UR QL SCN>25 NG/ML: NEGATIVE
PH UR STRIP: 7 [PH] (ref 5–8)
PLATELET # BLD AUTO: 194 K/UL (ref 150–350)
PMV BLD AUTO: 10.7 FL (ref 9.2–12.9)
POTASSIUM SERPL-SCNC: 4 MMOL/L (ref 3.5–5.1)
PROT SERPL-MCNC: 8 G/DL (ref 6–8.4)
PROT UR QL STRIP: NEGATIVE
RBC # BLD AUTO: 4.37 M/UL (ref 4.6–6.2)
RBC #/AREA URNS AUTO: 1 /HPF (ref 0–4)
SARS-COV-2 RDRP RESP QL NAA+PROBE: NEGATIVE
SODIUM SERPL-SCNC: 141 MMOL/L (ref 136–145)
SP GR UR STRIP: 1 (ref 1–1.03)
SQUAMOUS #/AREA URNS AUTO: 0 /HPF
TOXICOLOGY INFORMATION: NORMAL
TSH SERPL DL<=0.005 MIU/L-ACNC: 0.66 UIU/ML (ref 0.4–4)
URN SPEC COLLECT METH UR: ABNORMAL
WBC # BLD AUTO: 5.21 K/UL (ref 3.9–12.7)

## 2020-12-25 PROCEDURE — 80307 DRUG TEST PRSMV CHEM ANLYZR: CPT

## 2020-12-25 PROCEDURE — 99285 EMERGENCY DEPT VISIT HI MDM: CPT | Mod: CS,,, | Performed by: EMERGENCY MEDICINE

## 2020-12-25 PROCEDURE — 80329 ANALGESICS NON-OPIOID 1 OR 2: CPT

## 2020-12-25 PROCEDURE — 99284 EMERGENCY DEPT VISIT MOD MDM: CPT

## 2020-12-25 PROCEDURE — 80053 COMPREHEN METABOLIC PANEL: CPT

## 2020-12-25 PROCEDURE — U0002 COVID-19 LAB TEST NON-CDC: HCPCS | Performed by: EMERGENCY MEDICINE

## 2020-12-25 PROCEDURE — 99285 PR EMERGENCY DEPT VISIT,LEVEL V: ICD-10-PCS | Mod: CS,,, | Performed by: EMERGENCY MEDICINE

## 2020-12-25 PROCEDURE — 25000003 PHARM REV CODE 250: Performed by: EMERGENCY MEDICINE

## 2020-12-25 PROCEDURE — 81001 URINALYSIS AUTO W/SCOPE: CPT

## 2020-12-25 PROCEDURE — 80320 DRUG SCREEN QUANTALCOHOLS: CPT

## 2020-12-25 PROCEDURE — 84443 ASSAY THYROID STIM HORMONE: CPT

## 2020-12-25 PROCEDURE — 85025 COMPLETE CBC W/AUTO DIFF WBC: CPT

## 2020-12-25 RX ORDER — AMLODIPINE BESYLATE 5 MG/1
5 TABLET ORAL DAILY
Qty: 30 TABLET | Refills: 0 | Status: SHIPPED | OUTPATIENT
Start: 2020-12-25 | End: 2022-04-11 | Stop reason: SDUPTHER

## 2020-12-25 RX ORDER — ACETAMINOPHEN 325 MG/1
650 TABLET ORAL EVERY 6 HOURS PRN
Qty: 30 TABLET | Refills: 0 | Status: SHIPPED | OUTPATIENT
Start: 2020-12-25

## 2020-12-25 RX ORDER — AMLODIPINE BESYLATE 5 MG/1
5 TABLET ORAL
Status: COMPLETED | OUTPATIENT
Start: 2020-12-25 | End: 2020-12-25

## 2020-12-25 RX ORDER — ACETAMINOPHEN 500 MG
1000 TABLET ORAL
Status: COMPLETED | OUTPATIENT
Start: 2020-12-25 | End: 2020-12-25

## 2020-12-25 RX ADMIN — AMLODIPINE BESYLATE 5 MG: 5 TABLET ORAL at 02:12

## 2020-12-25 RX ADMIN — ACETAMINOPHEN 1000 MG: 500 TABLET ORAL at 11:12

## 2020-12-25 NOTE — ED NOTES
Pt provided with lunch.  Pt remains in paper scrubs with sitter maintaining 1:1 supervision and documenting Q15 with all hazardous items removed from the room and belongings secured.  Pt calm and cooperative. Pt resting comfortably and independently repositioned in stretcher with bed locked in lowest position for safety. NAD noted at this time. Respirations even and unlabored and visible chest rise noted.   Pt instructed to call if assistance is needed. No needs at this time. Will continue to monitor.

## 2020-12-25 NOTE — ED NOTES
2 bags pt belongings wand per security, then labeled and placed in closet: shoes, jeans, undershirt, tshirt, sweatshirt, coat, belt.  There is no money order with pt on arrival to room.

## 2020-12-25 NOTE — ED NOTES
Pt remains in paper scrubs with sitter maintaining 1:1 supervision and documenting Q15 with all hazardous items removed from the room and belongings secured.  Pt calm and cooperative. Pt resting comfortably and independently repositioned in stretcher with bed locked in lowest position for safety. NAD noted at this time. Respirations even and unlabored and visible chest rise noted.   Pt instructed to call if assistance is needed. No needs at this time. Will continue to monitor.

## 2020-12-25 NOTE — ED NOTES
Patient discharged home  Discharge instructions given  Patient verbalizes understanding   Patient denies pain, chest pain and shortness of breath
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
## 2020-12-25 NOTE — ED NOTES
Pt states he will follow up with PCP regarding BP medications on Tuesday and was given Rx until able to.  All belongings given to the pt including wallet, phone, keys and money.  Pt states no need to count when RN suggested it.
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
## 2020-12-25 NOTE — ED NOTES
Pt valuables (keys, phone, $44.41 in bills and change, and leather wallet) signed over and kept in safe. Charge RN aware.

## 2021-02-02 ENCOUNTER — HOSPITAL ENCOUNTER (EMERGENCY)
Facility: HOSPITAL | Age: 61
Discharge: HOME | End: 2021-02-02
Attending: EMERGENCY MEDICINE
Payer: MEDICAID

## 2021-02-02 VITALS
RESPIRATION RATE: 18 BRPM | SYSTOLIC BLOOD PRESSURE: 147 MMHG | TEMPERATURE: 99 F | OXYGEN SATURATION: 97 % | BODY MASS INDEX: 31.81 KG/M2 | HEART RATE: 84 BPM | WEIGHT: 240 LBS | HEIGHT: 73 IN | DIASTOLIC BLOOD PRESSURE: 76 MMHG

## 2021-02-02 DIAGNOSIS — M25.561 PAIN AND SWELLING OF RIGHT KNEE: ICD-10-CM

## 2021-02-02 DIAGNOSIS — M54.31 SCIATICA OF RIGHT SIDE: ICD-10-CM

## 2021-02-02 DIAGNOSIS — M25.461 KNEE EFFUSION, RIGHT: Primary | ICD-10-CM

## 2021-02-02 DIAGNOSIS — M25.461 PAIN AND SWELLING OF RIGHT KNEE: ICD-10-CM

## 2021-02-02 LAB — HIV 1+2 AB+HIV1 P24 AG SERPL QL IA: NEGATIVE

## 2021-02-02 PROCEDURE — 96372 THER/PROPH/DIAG INJ SC/IM: CPT | Mod: 59

## 2021-02-02 PROCEDURE — 25000003 PHARM REV CODE 250: Performed by: EMERGENCY MEDICINE

## 2021-02-02 PROCEDURE — 86703 HIV-1/HIV-2 1 RESULT ANTBDY: CPT

## 2021-02-02 PROCEDURE — 99284 EMERGENCY DEPT VISIT MOD MDM: CPT | Mod: 25

## 2021-02-02 PROCEDURE — 99285 PR EMERGENCY DEPT VISIT,LEVEL V: ICD-10-PCS | Mod: ,,, | Performed by: EMERGENCY MEDICINE

## 2021-02-02 PROCEDURE — 99285 EMERGENCY DEPT VISIT HI MDM: CPT | Mod: ,,, | Performed by: EMERGENCY MEDICINE

## 2021-02-02 PROCEDURE — 63600175 PHARM REV CODE 636 W HCPCS: Performed by: EMERGENCY MEDICINE

## 2021-02-02 RX ORDER — ACETAMINOPHEN 325 MG/1
650 TABLET ORAL
Status: COMPLETED | OUTPATIENT
Start: 2021-02-02 | End: 2021-02-02

## 2021-02-02 RX ORDER — DEXAMETHASONE SODIUM PHOSPHATE 4 MG/ML
8 INJECTION, SOLUTION INTRA-ARTICULAR; INTRALESIONAL; INTRAMUSCULAR; INTRAVENOUS; SOFT TISSUE
Status: COMPLETED | OUTPATIENT
Start: 2021-02-02 | End: 2021-02-02

## 2021-02-02 RX ADMIN — ACETAMINOPHEN 650 MG: 325 TABLET ORAL at 04:02

## 2021-02-02 RX ADMIN — DEXAMETHASONE SODIUM PHOSPHATE 8 MG: 4 INJECTION INTRA-ARTICULAR; INTRALESIONAL; INTRAMUSCULAR; INTRAVENOUS; SOFT TISSUE at 04:02

## 2021-02-10 ENCOUNTER — HOSPITAL ENCOUNTER (EMERGENCY)
Facility: HOSPITAL | Age: 61
Discharge: HOME | End: 2021-02-10
Attending: EMERGENCY MEDICINE
Payer: MEDICAID

## 2021-02-10 VITALS
DIASTOLIC BLOOD PRESSURE: 75 MMHG | OXYGEN SATURATION: 98 % | RESPIRATION RATE: 16 BRPM | HEART RATE: 76 BPM | TEMPERATURE: 98 F | HEIGHT: 73 IN | WEIGHT: 239 LBS | BODY MASS INDEX: 31.68 KG/M2 | SYSTOLIC BLOOD PRESSURE: 144 MMHG

## 2021-02-10 DIAGNOSIS — M25.561 CHRONIC PAIN OF RIGHT KNEE: ICD-10-CM

## 2021-02-10 DIAGNOSIS — G89.29 CHRONIC LEFT-SIDED LOW BACK PAIN WITH RIGHT-SIDED SCIATICA: Primary | ICD-10-CM

## 2021-02-10 DIAGNOSIS — M54.41 CHRONIC LEFT-SIDED LOW BACK PAIN WITH RIGHT-SIDED SCIATICA: Primary | ICD-10-CM

## 2021-02-10 DIAGNOSIS — G89.29 CHRONIC PAIN OF RIGHT KNEE: ICD-10-CM

## 2021-02-10 LAB
BUN SERPL-MCNC: 22 MG/DL (ref 6–30)
CHLORIDE SERPL-SCNC: 106 MMOL/L (ref 95–110)
CREAT SERPL-MCNC: 1.3 MG/DL (ref 0.5–1.4)
GLUCOSE SERPL-MCNC: 88 MG/DL (ref 70–110)
HCT VFR BLD CALC: 38 %PCV (ref 36–54)
POC IONIZED CALCIUM: 1.13 MMOL/L (ref 1.06–1.42)
POC TCO2 (MEASURED): 29 MMOL/L (ref 23–29)
POTASSIUM BLD-SCNC: 4 MMOL/L (ref 3.5–5.1)
SAMPLE: NORMAL
SODIUM BLD-SCNC: 142 MMOL/L (ref 136–145)

## 2021-02-10 PROCEDURE — 96372 THER/PROPH/DIAG INJ SC/IM: CPT

## 2021-02-10 PROCEDURE — 25000003 PHARM REV CODE 250: Performed by: EMERGENCY MEDICINE

## 2021-02-10 PROCEDURE — 99284 EMERGENCY DEPT VISIT MOD MDM: CPT | Mod: ,,, | Performed by: EMERGENCY MEDICINE

## 2021-02-10 PROCEDURE — 63600175 PHARM REV CODE 636 W HCPCS: Performed by: EMERGENCY MEDICINE

## 2021-02-10 PROCEDURE — 96374 THER/PROPH/DIAG INJ IV PUSH: CPT

## 2021-02-10 PROCEDURE — 99284 EMERGENCY DEPT VISIT MOD MDM: CPT | Mod: 25

## 2021-02-10 PROCEDURE — 99284 PR EMERGENCY DEPT VISIT,LEVEL IV: ICD-10-PCS | Mod: ,,, | Performed by: EMERGENCY MEDICINE

## 2021-02-10 RX ORDER — ACETAMINOPHEN 500 MG
1000 TABLET ORAL
Status: COMPLETED | OUTPATIENT
Start: 2021-02-10 | End: 2021-02-10

## 2021-02-10 RX ORDER — DEXAMETHASONE SODIUM PHOSPHATE 4 MG/ML
12 INJECTION, SOLUTION INTRA-ARTICULAR; INTRALESIONAL; INTRAMUSCULAR; INTRAVENOUS; SOFT TISSUE
Status: COMPLETED | OUTPATIENT
Start: 2021-02-10 | End: 2021-02-10

## 2021-02-10 RX ORDER — KETOROLAC TROMETHAMINE 30 MG/ML
10 INJECTION, SOLUTION INTRAMUSCULAR; INTRAVENOUS
Status: COMPLETED | OUTPATIENT
Start: 2021-02-10 | End: 2021-02-10

## 2021-02-10 RX ORDER — METHYLPREDNISOLONE 4 MG/1
TABLET ORAL
Qty: 1 PACKAGE | Refills: 0 | Status: SHIPPED | OUTPATIENT
Start: 2021-02-10 | End: 2021-03-03

## 2021-02-10 RX ORDER — LIDOCAINE 50 MG/G
1 PATCH TOPICAL DAILY
Qty: 14 PATCH | Refills: 0 | Status: SHIPPED | OUTPATIENT
Start: 2021-02-10 | End: 2021-02-24

## 2021-02-10 RX ORDER — LIDOCAINE 50 MG/G
1 PATCH TOPICAL
Status: DISCONTINUED | OUTPATIENT
Start: 2021-02-10 | End: 2021-02-10 | Stop reason: HOSPADM

## 2021-02-10 RX ADMIN — LIDOCAINE 1 PATCH: 50 PATCH TOPICAL at 04:02

## 2021-02-10 RX ADMIN — KETOROLAC TROMETHAMINE 10 MG: 30 INJECTION, SOLUTION INTRAMUSCULAR at 05:02

## 2021-02-10 RX ADMIN — DEXAMETHASONE SODIUM PHOSPHATE 12 MG: 4 INJECTION INTRA-ARTICULAR; INTRALESIONAL; INTRAMUSCULAR; INTRAVENOUS; SOFT TISSUE at 05:02

## 2021-02-10 RX ADMIN — ACETAMINOPHEN 1000 MG: 500 TABLET ORAL at 04:02

## 2021-02-22 ENCOUNTER — PATIENT OUTREACH (OUTPATIENT)
Dept: EMERGENCY MEDICINE | Facility: HOSPITAL | Age: 61
End: 2021-02-22

## 2021-05-20 ENCOUNTER — HOSPITAL ENCOUNTER (EMERGENCY)
Facility: HOSPITAL | Age: 61
Discharge: LEFT BEFORE DISCHARGE | End: 2021-05-20
Attending: EMERGENCY MEDICINE
Payer: MEDICAID

## 2021-05-20 VITALS
WEIGHT: 235 LBS | RESPIRATION RATE: 16 BRPM | TEMPERATURE: 99 F | SYSTOLIC BLOOD PRESSURE: 137 MMHG | BODY MASS INDEX: 31.14 KG/M2 | HEIGHT: 73 IN | OXYGEN SATURATION: 97 % | DIASTOLIC BLOOD PRESSURE: 83 MMHG | HEART RATE: 95 BPM

## 2021-05-20 DIAGNOSIS — N50.89 TESTICULAR SWELLING, LEFT: ICD-10-CM

## 2021-05-20 PROCEDURE — 99282 PR EMERGENCY DEPT VISIT,LEVEL II: ICD-10-PCS | Mod: ,,, | Performed by: EMERGENCY MEDICINE

## 2021-05-20 PROCEDURE — 99282 EMERGENCY DEPT VISIT SF MDM: CPT | Mod: ,,, | Performed by: EMERGENCY MEDICINE

## 2021-05-20 PROCEDURE — 99281 EMR DPT VST MAYX REQ PHY/QHP: CPT

## 2021-06-16 ENCOUNTER — HOSPITAL ENCOUNTER (EMERGENCY)
Facility: HOSPITAL | Age: 61
Discharge: HOME | End: 2021-06-16
Attending: EMERGENCY MEDICINE
Payer: MEDICAID

## 2021-06-16 VITALS
SYSTOLIC BLOOD PRESSURE: 118 MMHG | TEMPERATURE: 98 F | WEIGHT: 240 LBS | OXYGEN SATURATION: 98 % | HEIGHT: 73 IN | RESPIRATION RATE: 18 BRPM | HEART RATE: 85 BPM | DIASTOLIC BLOOD PRESSURE: 77 MMHG | BODY MASS INDEX: 31.81 KG/M2

## 2021-06-16 DIAGNOSIS — Z87.39 HISTORY OF KNEE PROBLEM: Primary | ICD-10-CM

## 2021-06-16 PROCEDURE — 99284 PR EMERGENCY DEPT VISIT,LEVEL IV: ICD-10-PCS | Mod: ,,, | Performed by: PHYSICIAN ASSISTANT

## 2021-06-16 PROCEDURE — 99282 EMERGENCY DEPT VISIT SF MDM: CPT

## 2021-06-16 PROCEDURE — 99284 EMERGENCY DEPT VISIT MOD MDM: CPT | Mod: ,,, | Performed by: PHYSICIAN ASSISTANT

## 2021-08-12 ENCOUNTER — HOSPITAL ENCOUNTER (EMERGENCY)
Facility: HOSPITAL | Age: 61
Discharge: HOME | End: 2021-08-12
Attending: EMERGENCY MEDICINE
Payer: MEDICAID

## 2021-08-12 VITALS
WEIGHT: 240.06 LBS | BODY MASS INDEX: 31.82 KG/M2 | OXYGEN SATURATION: 96 % | HEIGHT: 73 IN | RESPIRATION RATE: 17 BRPM | SYSTOLIC BLOOD PRESSURE: 150 MMHG | HEART RATE: 86 BPM | TEMPERATURE: 98 F | DIASTOLIC BLOOD PRESSURE: 71 MMHG

## 2021-08-12 DIAGNOSIS — M25.561 KNEE PAIN, RIGHT: Primary | ICD-10-CM

## 2021-08-12 PROCEDURE — 63600175 PHARM REV CODE 636 W HCPCS: Performed by: PHYSICIAN ASSISTANT

## 2021-08-12 PROCEDURE — 25000003 PHARM REV CODE 250: Performed by: PHYSICIAN ASSISTANT

## 2021-08-12 PROCEDURE — 99284 EMERGENCY DEPT VISIT MOD MDM: CPT | Mod: ,,, | Performed by: PHYSICIAN ASSISTANT

## 2021-08-12 PROCEDURE — 99284 EMERGENCY DEPT VISIT MOD MDM: CPT | Mod: 25

## 2021-08-12 PROCEDURE — 99284 PR EMERGENCY DEPT VISIT,LEVEL IV: ICD-10-PCS | Mod: ,,, | Performed by: PHYSICIAN ASSISTANT

## 2021-08-12 PROCEDURE — 96372 THER/PROPH/DIAG INJ SC/IM: CPT

## 2021-08-12 RX ORDER — KETOROLAC TROMETHAMINE 30 MG/ML
15 INJECTION, SOLUTION INTRAMUSCULAR; INTRAVENOUS
Status: COMPLETED | OUTPATIENT
Start: 2021-08-12 | End: 2021-08-12

## 2021-08-12 RX ORDER — ACETAMINOPHEN 325 MG/1
650 TABLET ORAL
Status: COMPLETED | OUTPATIENT
Start: 2021-08-12 | End: 2021-08-12

## 2021-08-12 RX ORDER — MELOXICAM 7.5 MG/1
7.5 TABLET ORAL DAILY
Qty: 5 TABLET | Refills: 0 | Status: SHIPPED | OUTPATIENT
Start: 2021-08-13 | End: 2021-08-18

## 2021-08-12 RX ADMIN — KETOROLAC TROMETHAMINE 15 MG: 30 INJECTION, SOLUTION INTRAMUSCULAR; INTRAVENOUS at 09:08

## 2021-08-12 RX ADMIN — ACETAMINOPHEN 650 MG: 325 TABLET ORAL at 09:08

## 2021-11-17 ENCOUNTER — HOSPITAL ENCOUNTER (EMERGENCY)
Facility: HOSPITAL | Age: 61
Discharge: HOME | End: 2021-11-17
Attending: EMERGENCY MEDICINE
Payer: MEDICAID

## 2021-11-17 VITALS
OXYGEN SATURATION: 97 % | RESPIRATION RATE: 18 BRPM | TEMPERATURE: 98 F | HEART RATE: 88 BPM | SYSTOLIC BLOOD PRESSURE: 161 MMHG | DIASTOLIC BLOOD PRESSURE: 85 MMHG | BODY MASS INDEX: 32.47 KG/M2 | HEIGHT: 73 IN | WEIGHT: 245 LBS

## 2021-11-17 DIAGNOSIS — K02.9 DENTAL CARIES: Primary | ICD-10-CM

## 2021-11-17 DIAGNOSIS — K02.9 PAIN DUE TO DENTAL CARIES: ICD-10-CM

## 2021-11-17 PROCEDURE — 99283 PR EMERGENCY DEPT VISIT,LEVEL III: ICD-10-PCS | Mod: ,,, | Performed by: EMERGENCY MEDICINE

## 2021-11-17 PROCEDURE — 99283 EMERGENCY DEPT VISIT LOW MDM: CPT | Mod: ,,, | Performed by: EMERGENCY MEDICINE

## 2021-11-17 PROCEDURE — 99283 EMERGENCY DEPT VISIT LOW MDM: CPT

## 2021-11-17 RX ORDER — IBUPROFEN 600 MG/1
600 TABLET ORAL EVERY 6 HOURS PRN
Qty: 20 TABLET | Refills: 0 | Status: SHIPPED | OUTPATIENT
Start: 2021-11-17 | End: 2022-08-12 | Stop reason: CLARIF

## 2021-12-26 ENCOUNTER — HOSPITAL ENCOUNTER (EMERGENCY)
Facility: HOSPITAL | Age: 61
Discharge: HOME | End: 2021-12-26
Attending: EMERGENCY MEDICINE
Payer: MEDICAID

## 2021-12-26 VITALS
WEIGHT: 240 LBS | HEART RATE: 81 BPM | BODY MASS INDEX: 30.8 KG/M2 | DIASTOLIC BLOOD PRESSURE: 92 MMHG | SYSTOLIC BLOOD PRESSURE: 165 MMHG | RESPIRATION RATE: 20 BRPM | OXYGEN SATURATION: 98 % | TEMPERATURE: 98 F | HEIGHT: 74 IN

## 2021-12-26 DIAGNOSIS — Z20.822 LAB TEST NEGATIVE FOR COVID-19 VIRUS: Primary | ICD-10-CM

## 2021-12-26 LAB
CTP QC/QA: YES
SARS-COV-2 RDRP RESP QL NAA+PROBE: NEGATIVE

## 2021-12-26 PROCEDURE — 99282 PR EMERGENCY DEPT VISIT,LEVEL II: ICD-10-PCS | Mod: CS,,, | Performed by: PHYSICIAN ASSISTANT

## 2021-12-26 PROCEDURE — 99282 EMERGENCY DEPT VISIT SF MDM: CPT | Mod: CS,,, | Performed by: PHYSICIAN ASSISTANT

## 2021-12-26 PROCEDURE — U0002 COVID-19 LAB TEST NON-CDC: HCPCS | Performed by: EMERGENCY MEDICINE

## 2021-12-26 PROCEDURE — 99282 EMERGENCY DEPT VISIT SF MDM: CPT | Mod: 25

## 2021-12-26 NOTE — Clinical Note
"Polo"Beth Small was seen and treated in our emergency department on 12/26/2021.  He may return to work on 12/27/2021.       If you have any questions or concerns, please don't hesitate to call.      TC Rodriguez RN    "

## 2021-12-26 NOTE — ED PROVIDER NOTES
"Encounter Date: 12/26/2021       History     Chief Complaint   Patient presents with    COVID-19 Concerns     Cough and runny nose x 1 week, wants a covid test      61-year-old male presents to the ED with COVID-19 concerns.  History of HTN, hep C, asthma.  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  He states that the customer was wearing to masks.  This was 2 or 3 days ago.  Patient denies any symptoms at this time.          Review of patient's allergies indicates:  No Known Allergies  Past Medical History:   Diagnosis Date    Asthma     Hepatitis C     Hypertension     Varicose veins of both lower extremities      No past surgical history on file.  Family History   Problem Relation Age of Onset    Cancer Mother     Heart disease Father      Social History     Tobacco Use    Smoking status: Current Every Day Smoker     Packs/day: 1.00     Years: 25.00     Pack years: 25.00     Types: Cigarettes    Smokeless tobacco: Never Used   Substance Use Topics    Alcohol use: Yes     Comment: 3-4 beers every other day    Drug use: Not Currently     Types: "Crack" cocaine     Comment: crack     Review of Systems   Constitutional: Negative for fever.   HENT: Negative for sore throat.    Respiratory: Negative for shortness of breath.    Cardiovascular: Negative for chest pain.   Gastrointestinal: Negative for nausea.   Genitourinary: Negative for dysuria.   Musculoskeletal: Negative for back pain.   Skin: Negative for rash.   Neurological: Negative for weakness.   Hematological: Does not bruise/bleed easily.       Physical Exam     Initial Vitals [12/26/21 0917]   BP Pulse Resp Temp SpO2   (!) 165/92 81 20 98 °F (36.7 °C) 98 %      MAP       --         Physical Exam    Nursing note and vitals reviewed.  Constitutional: He appears well-developed and well-nourished.  Non-toxic appearance. He does not appear ill. No distress.   HENT:   Head: Normocephalic and atraumatic. "   Neck: Neck supple.   Normal range of motion.  Cardiovascular: Normal rate and regular rhythm. Exam reveals no gallop, no distant heart sounds and no friction rub.    No murmur heard.  Pulmonary/Chest: Effort normal and breath sounds normal. No accessory muscle usage. No tachypnea. No respiratory distress. He has no decreased breath sounds. He has no wheezes. He has no rhonchi. He has no rales.   Abdominal: He exhibits no distension.   Musculoskeletal:      Cervical back: Normal range of motion and neck supple.     Neurological: He is alert.   Skin: No rash noted.         ED Course   Procedures  Labs Reviewed   SARS-COV-2 RDRP GENE          Imaging Results    None          Medications - No data to display  Medical Decision Making:   History:   Old Medical Records: I decided to obtain old medical records.  Initial Assessment:   61-year-old male presents to the ED with COVID-19 concerns.  Asymptomatic.  BP is 165/92.  Vitals otherwise within normal limits.  No acute distress.  Differential Diagnosis:   My differential diagnosis includes but is not limited to:  COVID exposure, COVID infection, health anxiety  Clinical Tests:   Lab Tests: Ordered  ED Management:  COVID test is negative.  Patient was discharged in stable condition.  No additional testing at this time as patient is asymptomatic.                      Clinical Impression:   Final diagnoses:  [Z20.822] Lab test negative for COVID-19 virus (Primary)          ED Disposition Condition    Discharge Stable        ED Prescriptions     None        Follow-up Information    None          Saranya Velarde PA-C  12/26/21 3221

## 2021-12-26 NOTE — Clinical Note
"Polo"Beth Small was seen and treated in our emergency department on 12/26/2021.  He may return to work on 12/27/2021.       If you have any questions or concerns, please don't hesitate to call.      Jennifer MONTEZ    "

## 2021-12-26 NOTE — ED TRIAGE NOTES
Polo Small, a 61 y.o. male presents to the ED w/ complaint of COVID-19 exposure. Pt states he was exposed two days ago; reports congestion, headaches, chills, body aches, diarrhea. Reports chronic SOB d/t asthma, that has not changed since exposure. Denies fever, n/v/diarrhea. Vaccinated x 2.     Triage note:  Chief Complaint   Patient presents with    COVID-19 Concerns     Cough and runny nose x 1 week, wants a covid test      Review of patient's allergies indicates:  No Known Allergies  Past Medical History:   Diagnosis Date    Asthma     Hepatitis C     Hypertension     Varicose veins of both lower extremities

## 2022-04-11 ENCOUNTER — HOSPITAL ENCOUNTER (EMERGENCY)
Facility: HOSPITAL | Age: 62
Discharge: HOME | End: 2022-04-11
Attending: EMERGENCY MEDICINE
Payer: MEDICAID

## 2022-04-11 VITALS
DIASTOLIC BLOOD PRESSURE: 72 MMHG | HEART RATE: 68 BPM | BODY MASS INDEX: 31.89 KG/M2 | SYSTOLIC BLOOD PRESSURE: 126 MMHG | WEIGHT: 245 LBS | OXYGEN SATURATION: 96 % | RESPIRATION RATE: 19 BRPM | TEMPERATURE: 98 F

## 2022-04-11 DIAGNOSIS — G44.89 OTHER HEADACHE SYNDROME: ICD-10-CM

## 2022-04-11 DIAGNOSIS — I10 PRIMARY HYPERTENSION: Primary | ICD-10-CM

## 2022-04-11 PROCEDURE — 99284 EMERGENCY DEPT VISIT MOD MDM: CPT | Mod: ,,, | Performed by: EMERGENCY MEDICINE

## 2022-04-11 PROCEDURE — 99284 PR EMERGENCY DEPT VISIT,LEVEL IV: ICD-10-PCS | Mod: ,,, | Performed by: EMERGENCY MEDICINE

## 2022-04-11 PROCEDURE — 25000003 PHARM REV CODE 250: Performed by: EMERGENCY MEDICINE

## 2022-04-11 PROCEDURE — 99284 EMERGENCY DEPT VISIT MOD MDM: CPT | Mod: 25

## 2022-04-11 RX ORDER — AMLODIPINE BESYLATE 5 MG/1
5 TABLET ORAL
Status: COMPLETED | OUTPATIENT
Start: 2022-04-11 | End: 2022-04-11

## 2022-04-11 RX ORDER — ACETAMINOPHEN 500 MG
1000 TABLET ORAL
Status: COMPLETED | OUTPATIENT
Start: 2022-04-11 | End: 2022-04-11

## 2022-04-11 RX ORDER — HYDROCHLOROTHIAZIDE 25 MG/1
25 TABLET ORAL
Status: COMPLETED | OUTPATIENT
Start: 2022-04-11 | End: 2022-04-11

## 2022-04-11 RX ORDER — METOCLOPRAMIDE HYDROCHLORIDE 5 MG/ML
10 INJECTION INTRAMUSCULAR; INTRAVENOUS
Status: DISCONTINUED | OUTPATIENT
Start: 2022-04-11 | End: 2022-04-11

## 2022-04-11 RX ORDER — AMLODIPINE BESYLATE 5 MG/1
5 TABLET ORAL DAILY
Qty: 30 TABLET | Refills: 0 | Status: SHIPPED | OUTPATIENT
Start: 2022-04-11 | End: 2023-03-06 | Stop reason: SDUPTHER

## 2022-04-11 RX ORDER — HYDROCHLOROTHIAZIDE 12.5 MG/1
12.5 CAPSULE ORAL DAILY
Qty: 30 CAPSULE | Refills: 0 | OUTPATIENT
Start: 2022-04-11 | End: 2022-08-19

## 2022-04-11 RX ADMIN — AMLODIPINE BESYLATE 5 MG: 5 TABLET ORAL at 12:04

## 2022-04-11 RX ADMIN — HYDROCHLOROTHIAZIDE 25 MG: 25 TABLET ORAL at 12:04

## 2022-04-11 RX ADMIN — ACETAMINOPHEN 1000 MG: 500 TABLET ORAL at 12:04

## 2022-04-11 NOTE — Clinical Note
"Polo"Beth Small was seen and treated in our emergency department on 4/11/2022.  He may return to work on 04/12/2022.       If you have any questions or concerns, please don't hesitate to call.      ADAM Souza RN RN    "

## 2022-04-11 NOTE — ED PROVIDER NOTES
"Encounter Date: 4/11/2022       History     Chief Complaint   Patient presents with    Hypertension     +headache, states ran out of BP meds x 1 week      60 y/o m, h/o HTN, HCV, c/o HA x 2-3 days, moderate, intermittent, gradual onset, similar HAs in the past, says he always knows his BP is elevated when he has this type of HA.  Ran out of HCTZ and amlodipine 1 week ago.  No vision changes/confusion/neck pain/fever/chills/focal weakness or numbness.  Pt also asking for work note.    The history is provided by the patient.     Review of patient's allergies indicates:  No Known Allergies  Past Medical History:   Diagnosis Date    Asthma     Hepatitis C     Hypertension     Varicose veins of both lower extremities      No past surgical history on file.  Family History   Problem Relation Age of Onset    Cancer Mother     Heart disease Father      Social History     Tobacco Use    Smoking status: Current Every Day Smoker     Packs/day: 1.00     Years: 25.00     Pack years: 25.00     Types: Cigarettes    Smokeless tobacco: Never Used   Substance Use Topics    Alcohol use: Yes     Comment: 3-4 beers every other day    Drug use: Not Currently     Types: "Crack" cocaine     Comment: crack     Review of Systems   Eyes: Negative for photophobia and visual disturbance.   Musculoskeletal: Negative for neck pain.   Neurological: Positive for headaches. Negative for dizziness, speech difficulty, weakness and numbness.       Physical Exam     Initial Vitals [04/11/22 1121]   BP Pulse Resp Temp SpO2   (!) 166/78 85 18 97.8 °F (36.6 °C) 96 %      MAP       --         Physical Exam    Nursing note and vitals reviewed.  Constitutional: Vital signs are normal. He appears well-developed and well-nourished. He is not diaphoretic.  Non-toxic appearance. He does not appear ill. No distress.   HENT:   Head: Normocephalic and atraumatic.   Mouth/Throat: Mucous membranes are normal. Mucous membranes are not dry.   Eyes: " Conjunctivae, EOM and lids are normal. Pupils are equal, round, and reactive to light.   Neck: Neck supple.   Normal range of motion.  Cardiovascular: Normal rate.   Musculoskeletal:      Cervical back: Normal range of motion and neck supple.     Neurological: He is alert and oriented to person, place, and time. He has normal strength. No sensory deficit. GCS score is 15. GCS eye subscore is 4. GCS verbal subscore is 5. GCS motor subscore is 6.   Skin: Skin is dry and intact. No pallor.   Psychiatric: He has a normal mood and affect. His speech is normal and behavior is normal.         ED Course   Procedures  Labs Reviewed - No data to display       Imaging Results    None          Medications   amLODIPine tablet 5 mg (5 mg Oral Given 4/11/22 1241)   hydroCHLOROthiazide tablet 25 mg (25 mg Oral Given 4/11/22 1241)   acetaminophen tablet 1,000 mg (1,000 mg Oral Given 4/11/22 1241)     Medical Decision Making:   History:   Old Medical Records: I decided to obtain old medical records.  Initial Assessment:   Pt's HA seems benign in nature.  Most likely migraine or tension HA or less likely related to moderately elevated BP.  Have considered SAH, meningitis/encephalitis, central venous thrombosis, temporal arteritis, pituitary apoplexy, vertebral/carotid artery dissection and and other serious causes of HA but pt's clinical presentation does not seem consistent with these diagnoses.  Feel that working pt up for these diagnoses would introduce serious burdens and risks and that the best plan now is one of supportive care, clear discharge instructions and close f/u with PCP.    ED Management:  Refill BP meds  Tylenol  Work note  Encouraged close f/u PCP                      Clinical Impression:   Final diagnoses:  [I10] Primary hypertension (Primary)  [G44.89] Other headache syndrome          ED Disposition Condition    Discharge Stable        ED Prescriptions     Medication Sig Dispense Start Date End Date Auth. Provider     amLODIPine (NORVASC) 5 MG tablet Take 1 tablet (5 mg total) by mouth once daily. 30 tablet 4/11/2022 5/11/2022 Yesenia Conklin MD    hydroCHLOROthiazide (MICROZIDE) 12.5 mg capsule Take 1 capsule (12.5 mg total) by mouth once daily. 30 capsule 4/11/2022  Yesenia Conklin MD        Follow-up Information     Follow up With Specialties Details Why Contact Info    Cynthia Duque NP Hospitalist Go to   [facility] [facility] [address]  [phone]             Yesenia Conklin MD  04/11/22 5346

## 2022-04-11 NOTE — Clinical Note
"Polo"Beth Small was seen and treated in our emergency department on 4/11/2022.  He may return to work on 04/12/2022.       If you have any questions or concerns, please don't hesitate to call.      Yesenia Conklin MD"

## 2022-08-12 ENCOUNTER — HOSPITAL ENCOUNTER (EMERGENCY)
Facility: HOSPITAL | Age: 62
Discharge: HOME | End: 2022-08-12
Attending: EMERGENCY MEDICINE
Payer: MEDICAID

## 2022-08-12 VITALS
SYSTOLIC BLOOD PRESSURE: 131 MMHG | RESPIRATION RATE: 16 BRPM | HEART RATE: 70 BPM | HEIGHT: 73 IN | BODY MASS INDEX: 31.81 KG/M2 | WEIGHT: 240 LBS | DIASTOLIC BLOOD PRESSURE: 71 MMHG | OXYGEN SATURATION: 97 % | TEMPERATURE: 100 F

## 2022-08-12 DIAGNOSIS — Z72.0 TOBACCO USE: ICD-10-CM

## 2022-08-12 DIAGNOSIS — M19.90 ARTHRITIS: ICD-10-CM

## 2022-08-12 DIAGNOSIS — G89.29 CHRONIC PAIN OF BOTH SHOULDERS: Primary | ICD-10-CM

## 2022-08-12 DIAGNOSIS — M25.512 CHRONIC PAIN OF BOTH SHOULDERS: Primary | ICD-10-CM

## 2022-08-12 DIAGNOSIS — M25.511 CHRONIC PAIN OF BOTH SHOULDERS: Primary | ICD-10-CM

## 2022-08-12 PROCEDURE — 99284 EMERGENCY DEPT VISIT MOD MDM: CPT | Mod: ,,, | Performed by: PHYSICIAN ASSISTANT

## 2022-08-12 PROCEDURE — 99284 EMERGENCY DEPT VISIT MOD MDM: CPT | Mod: 25

## 2022-08-12 PROCEDURE — 99284 PR EMERGENCY DEPT VISIT,LEVEL IV: ICD-10-PCS | Mod: ,,, | Performed by: PHYSICIAN ASSISTANT

## 2022-08-12 PROCEDURE — 63600175 PHARM REV CODE 636 W HCPCS: Performed by: PHYSICIAN ASSISTANT

## 2022-08-12 PROCEDURE — 96372 THER/PROPH/DIAG INJ SC/IM: CPT | Performed by: PHYSICIAN ASSISTANT

## 2022-08-12 RX ORDER — LIDOCAINE 50 MG/G
1 PATCH TOPICAL DAILY
Qty: 15 PATCH | Refills: 0 | Status: SHIPPED | OUTPATIENT
Start: 2022-08-12 | End: 2022-08-27

## 2022-08-12 RX ORDER — METHOCARBAMOL 750 MG/1
750 TABLET, FILM COATED ORAL 2 TIMES DAILY PRN
Qty: 14 TABLET | Refills: 0 | Status: SHIPPED | OUTPATIENT
Start: 2022-08-12 | End: 2022-08-19

## 2022-08-12 RX ORDER — KETOROLAC TROMETHAMINE 30 MG/ML
10 INJECTION, SOLUTION INTRAMUSCULAR; INTRAVENOUS
Status: COMPLETED | OUTPATIENT
Start: 2022-08-12 | End: 2022-08-12

## 2022-08-12 RX ORDER — BUDESONIDE AND FORMOTEROL FUMARATE DIHYDRATE 80; 4.5 UG/1; UG/1
2 AEROSOL RESPIRATORY (INHALATION)
COMMUNITY

## 2022-08-12 RX ORDER — CITALOPRAM 10 MG/1
10 TABLET ORAL DAILY
COMMUNITY
End: 2023-03-06

## 2022-08-12 RX ADMIN — KETOROLAC TROMETHAMINE 10 MG: 30 INJECTION, SOLUTION INTRAMUSCULAR; INTRAVENOUS at 07:08

## 2022-08-12 NOTE — ED TRIAGE NOTES
"62 y.o. male arrived to the ED via personal transport from home for c/o shoulder pain. Pt w/ hx of chronic pain to shoulders bilaterally since 2019 endorses "aching pain to rotator cuff." Pt reports activity of repetitive movements requiring the lifting of heavy objects and feels that may have exacerbated his pain. Reports tingling down bue.   "

## 2022-08-12 NOTE — ED PROVIDER NOTES
"Encounter Date: 8/12/2022       History     Chief Complaint   Patient presents with    Shoulder Pain     Chronic shoulder pain since 2019     The patient is a 62 year old male who has a documented past medical history of COPD, tobacco use, hepatitis C, asthma, HTN, depression, BPH, crack cocaine abuse in remission, and arthritis. He presents to the ER today c/o chronic bilateral shoulder pain. He reports having bilateral shoulder pain for the past 2 years. He states that he developed arthritis in both of his shoulders from repetitive heavy lifting on a daily basis required during his employment at [employer]. 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 It wore my shoulder joints out." He states that he has morning stiffness and aching pain. He has been on several pain medications for this over the past 2 years. He recently went to an outside ER on 8/922 for this and was given a Rx for Voltaren and was instructed to follow up with orthopedic clinic for outpatient x rays. He states that he tried taking the Voltaren, with minimal relief. He is requesting x rays and "something stronger" for pain. He denies any numbness, tingling, weakness, or decreased ROM. He states that raising/abducting his arms exacerbates the pain. He denies any acute traumatic injuries or falls. He denies any additional complaints or concerns at this time.         Review of patient's allergies indicates:  No Known Allergies  Past Medical History:   Diagnosis Date    Arthritis     Asthma     BPH (benign prostatic hyperplasia)     COPD (chronic obstructive pulmonary disease)     Depression     Hepatitis C     Hypertension     Varicose veins of both lower extremities      History reviewed. No pertinent surgical history.  Family History   Problem Relation Age of Onset    Cancer Mother     Heart disease Father      Social History     Tobacco Use    Smoking status: Current Every " "Day Smoker     Packs/day: 1.00     Years: 25.00     Pack years: 25.00     Types: Cigarettes    Smokeless tobacco: Never Used   Substance Use Topics    Alcohol use: Yes     Comment: 3-4 beers every other day    Drug use: Not Currently     Types: "Crack" cocaine     Review of Systems   Constitutional: Negative for chills and fever.   HENT: Negative for congestion, rhinorrhea and sore throat.    Eyes: Negative for visual disturbance.   Respiratory: Negative for cough and shortness of breath.    Cardiovascular: Negative for chest pain, palpitations and leg swelling.   Gastrointestinal: Negative for abdominal pain, diarrhea, nausea and vomiting.   Genitourinary: Negative for difficulty urinating and dysuria.   Musculoskeletal: Positive for arthralgias. Negative for back pain, gait problem, joint swelling, myalgias, neck pain and neck stiffness.   Skin: Negative for color change, rash and wound.   Allergic/Immunologic: Negative for immunocompromised state.   Neurological: Negative for dizziness, syncope, weakness, light-headedness, numbness and headaches.   Psychiatric/Behavioral: Negative for confusion.       Physical Exam     Initial Vitals [08/12/22 1532]   BP Pulse Resp Temp SpO2   129/73 99 18 99.8 °F (37.7 °C) 95 %      MAP       --         Physical Exam    Nursing note and vitals reviewed.  Constitutional: He appears well-developed and well-nourished. He is not diaphoretic. No distress.   HENT:   Head: Normocephalic.   Eyes: Conjunctivae are normal.   Neck:   Non-tender   Normal range of motion.  Cardiovascular: Normal rate and intact distal pulses.   Pulmonary/Chest: No respiratory distress.   Abdominal: He exhibits no distension. There is no abdominal tenderness.   Musculoskeletal:      Cervical back: Normal range of motion.      Comments: Bilateral shoulder pain reported with passive/active ROM; no decreased ROM; no bony point tenderness; no erythema or increased warmth; NV intact.      Neurological: He is " alert and oriented to person, place, and time. He has normal strength. No sensory deficit.   5/5 strength   Skin: Skin is warm and dry. No rash noted. No erythema.   Psychiatric: He has a normal mood and affect.         ED Course   Procedures  Labs Reviewed - No data to display       Imaging Results          X-Ray Shoulder 2 or more views Bilat (Final result)  Result time 08/12/22 18:33:41    Final result by Irving Oliver MD (08/12/22 18:33:41)                 Impression:      No acute radiographic abnormality.      Electronically signed by: Irving Oliver  Date:    08/12/2022  Time:    18:33             Narrative:    EXAMINATION:  XR SHOULDER COMPLETE 2 OR MORE VIEWS BILATERAL    CLINICAL HISTORY:  shoulder pain;    TECHNIQUE:  Two views of the right shoulder and two views of the left shoulder    COMPARISON:  10/08/2019    FINDINGS:  Mild degenerative changes of the AC joint bilaterally.  No acute fracture, subluxation or dislocation bilaterally.  Chest wall appears within normal limits bilaterally.  The humeral head is normally positioned bilaterally.  No advanced arthropathy mild probable chronic changes of the a lateral humeral head on the left.                                 Medications   ketorolac injection 9.999 mg (9.999 mg Intramuscular Given 8/12/22 1901)     Medical Decision Making:   History:   Old Medical Records: I decided to obtain old medical records.  Initial Assessment:   63 yo male, here c/o chronic aching pain to bilateral shoulder joints x 2 years.   Differential Diagnosis:   Arthritis, rotator cuff injury, labrum injury, tendonitis, bursitis, etc   Clinical Tests:   Radiological Study: Ordered and Reviewed  ED Management:  Vital signs reviewed   Records reviewed   X rays completed of bilateral shoulders - mild chronic degenerative changes - no acute findings  Patient advised to continue NSAID as directed - will add Rx's for Robaxin and Lidoderm to use PRN   Ambulatory referral to orthopedics  provided - pt advised to follow up at next available   Pt advised to follow up with his PCP in the next 2 days for re-evaluation and further management    Pt advised to return to the ER if worse in any way       Additional MDM:   Smoking Cessation: The patient is a smoker. The patient was counseled on smoking cessation for: 3 minutes. The patient was counseled on tobacco related  health complications. Appropriate patient literature was given to the patient concerning tobacco cessation.   X-Rays: I have independently interpreted X-Ray(s) - see notes.                    Clinical Impression:   Final diagnoses:  [M25.511, G89.29, M25.512] Chronic pain of both shoulders (Primary)  [M19.90] Arthritis  [Z72.0] Tobacco use          ED Disposition Condition    Discharge Stable        ED Prescriptions     Medication Sig Dispense Start Date End Date Auth. Provider    methocarbamoL (ROBAXIN) 750 MG Tab Take 1 tablet (750 mg total) by mouth 2 (two) times daily as needed (muscle pain). 14 tablet 8/12/2022 8/19/2022 Nicholas Alatorre PA-C    LIDOcaine (LIDODERM) 5 % Place 1 patch onto the skin once daily. Remove & Discard patch within 12 hours or as directed by MD for 15 doses 15 patch 8/12/2022 8/27/2022 Nicholas Alatorre PA-C        Follow-up Information     Follow up With Specialties Details Why Contact Info Additional Information    [facility] Emergency Medicine  If symptoms worsen in any way. Follow up with your primary care physician in the enxt 1-2 days for re-evaluation and further management. [address]  [facility] [id number] [phone]     [facility] Schedule an appointment as soon as possible for a visit  As needed [address], [address]  [facility] [id number] [phone] [facility] - Main Building, 5th Floor Please park in [facility] and take Atrium elevator           Nicholas Alatorre PA-C  08/13/22  0008

## 2022-08-12 NOTE — Clinical Note
"Polo"Beth Small was seen and treated in our emergency department on 8/12/2022.  He may return to work on 08/15/2022.       If you have any questions or concerns, please don't hesitate to call.      DR Peterson/ TC Francis    "

## 2022-08-13 NOTE — ED NOTES
Pt.  dc'd home all dc information reviewed with Pt. Verbalized understanding Pt. Ambulated to exit

## 2022-08-18 ENCOUNTER — PATIENT OUTREACH (OUTPATIENT)
Dept: EMERGENCY MEDICINE | Facility: HOSPITAL | Age: 62
End: 2022-08-18

## 2022-08-18 ENCOUNTER — HOSPITAL ENCOUNTER (EMERGENCY)
Facility: HOSPITAL | Age: 62
Discharge: HOME | End: 2022-08-19
Attending: STUDENT IN AN ORGANIZED HEALTH CARE EDUCATION/TRAINING PROGRAM
Payer: MEDICAID

## 2022-08-18 VITALS
BODY MASS INDEX: 30.8 KG/M2 | HEIGHT: 74 IN | SYSTOLIC BLOOD PRESSURE: 156 MMHG | DIASTOLIC BLOOD PRESSURE: 72 MMHG | OXYGEN SATURATION: 96 % | HEART RATE: 99 BPM | RESPIRATION RATE: 16 BRPM | WEIGHT: 240 LBS | TEMPERATURE: 99 F

## 2022-08-18 DIAGNOSIS — F14.921: ICD-10-CM

## 2022-08-18 DIAGNOSIS — I10 HYPERTENSION, UNSPECIFIED TYPE: Primary | ICD-10-CM

## 2022-08-18 PROCEDURE — 99283 EMERGENCY DEPT VISIT LOW MDM: CPT

## 2022-08-18 PROCEDURE — 99284 PR EMERGENCY DEPT VISIT,LEVEL IV: ICD-10-PCS | Mod: ,,, | Performed by: STUDENT IN AN ORGANIZED HEALTH CARE EDUCATION/TRAINING PROGRAM

## 2022-08-18 PROCEDURE — 99284 EMERGENCY DEPT VISIT MOD MDM: CPT | Mod: ,,, | Performed by: STUDENT IN AN ORGANIZED HEALTH CARE EDUCATION/TRAINING PROGRAM

## 2022-08-19 PROCEDURE — 25000003 PHARM REV CODE 250: Performed by: STUDENT IN AN ORGANIZED HEALTH CARE EDUCATION/TRAINING PROGRAM

## 2022-08-19 RX ORDER — HYDROCHLOROTHIAZIDE 12.5 MG/1
12.5 TABLET ORAL DAILY
Qty: 30 TABLET | Refills: 0 | Status: SHIPPED | OUTPATIENT
Start: 2022-08-19 | End: 2023-03-06 | Stop reason: SDUPTHER

## 2022-08-19 RX ORDER — HYDROCHLOROTHIAZIDE 12.5 MG/1
12.5 TABLET ORAL
Status: COMPLETED | OUTPATIENT
Start: 2022-08-19 | End: 2022-08-19

## 2022-08-19 RX ADMIN — HYDROCHLOROTHIAZIDE 12.5 MG: 12.5 TABLET ORAL at 12:08

## 2022-08-19 NOTE — ED TRIAGE NOTES
Polo Small, an 62 y.o. male presents to the ED c/o migraine and blurred vision today. Reports being out of BP medication for 1 week. Denies CP and SOB       Chief Complaint   Patient presents with    Hypertension     Ran out of BP meds a week ago; denies CP; reports HA     Review of patient's allergies indicates:  No Known Allergies  Past Medical History:   Diagnosis Date    Arthritis     Asthma     BPH (benign prostatic hyperplasia)     COPD (chronic obstructive pulmonary disease)     Depression     Hepatitis C     Hypertension     Varicose veins of both lower extremities        LOC: The patient is awake, alert and aware of environment with an appropriate affect, the patient is oriented x 3 and speaking appropriately.   APPEARANCE: Patient appears comfortable and in no acute distress, patient is clean and well groomed.  SKIN: The skin is warm and dry, color consistent with ethnicity.   MUSCULOSKELETAL: Patient moving all extremities spontaneously, no swelling noted.  RESPIRATORY: Airway is open and patent, respirations are spontaneous, patient has a normal effort and rate, no accessory muscle use noted.  CARDIAC: Patient has a normal rate and regular rhythm, no edema noted, capillary refill < 3 seconds.   GASTRO: Soft and non tender to palpation, no distention noted.   : Pt denies any pain or frequency with urination.  NEURO: Pt opens eyes spontaneously, behavior appropriate to situation, follows commands.

## 2022-08-19 NOTE — ED PROVIDER NOTES
"Encounter Date: 8/18/2022       History     Chief Complaint   Patient presents with    Hypertension     Ran out of BP meds a week ago; denies CP; reports HA     62M with prior polysubstance use, current tobacco use and HTN who presents with HA that happened this morning after he had run out of his medications for a week.  He takes hctz 12.5mg.  He gets HA when he runs out of blood pressure medications.  Denies recent use of cocaine.  Reports that he had some photophobia and lightheadedness but this has sinec resolved.  He has rotator cuff tenderness from prior injury.  He reports feeling sore but no other current symptoms.  No unilateral deficits.  No chest pain.  He is requesting refill for his hctz - his appt is 8/26 with his primary physician.        Review of patient's allergies indicates:  No Known Allergies  Past Medical History:   Diagnosis Date    Arthritis     Asthma     BPH (benign prostatic hyperplasia)     COPD (chronic obstructive pulmonary disease)     Depression     Hepatitis C     Hypertension     Varicose veins of both lower extremities      History reviewed. No pertinent surgical history.  Family History   Problem Relation Age of Onset    Cancer Mother     Heart disease Father      Social History     Tobacco Use    Smoking status: Current Every Day Smoker     Packs/day: 1.00     Years: 25.00     Pack years: 25.00     Types: Cigarettes    Smokeless tobacco: Never Used   Substance Use Topics    Alcohol use: Yes     Comment: 3-4 beers every other day    Drug use: Not Currently     Types: "Crack" cocaine     Review of Systems   Constitutional: Negative for fever.   HENT: Negative for congestion.    Eyes: Positive for photophobia (resolved).   Respiratory: Negative for cough and shortness of breath.    Cardiovascular: Negative for chest pain, palpitations and leg swelling.   Gastrointestinal: Negative for abdominal pain, nausea and vomiting.   Genitourinary: Negative for flank pain and " frequency.   Musculoskeletal: Negative for back pain.   Skin: Negative for rash.   Neurological: Positive for light-headedness (resolved) and headaches (resolving). Negative for syncope, speech difficulty, weakness and numbness.   Psychiatric/Behavioral: The patient is not nervous/anxious.        Physical Exam     Initial Vitals [08/18/22 2054]   BP Pulse Resp Temp SpO2   (!) 156/72 99 16 98.9 °F (37.2 °C) 96 %      MAP       --         Physical Exam    Nursing note and vitals reviewed.  Constitutional: He appears well-developed and well-nourished.   HENT:   Head: Normocephalic and atraumatic.   Nose: No rhinorrhea.   Eyes: EOM are normal.   Neck: Neck supple.   Normal range of motion.  Cardiovascular: Normal rate, regular rhythm and normal pulses.   Pulmonary/Chest: Effort normal and breath sounds normal.   Abdominal: Abdomen is flat. He exhibits no distension.   Musculoskeletal:         General: Normal range of motion.      Cervical back: Normal range of motion and neck supple.     Neurological: He is alert and oriented to person, place, and time. He has normal strength. No cranial nerve deficit.   Skin: Skin is warm and dry.   Psychiatric: Thought content normal.         ED Course   Procedures  Labs Reviewed - No data to display       Imaging Results    None          Medications   hydroCHLOROthiazide tablet 12.5 mg (has no administration in time range)     Medical Decision Making:   History:   Old Records Summarized: records from clinic visits.  ED Management:  Very well appearing male with HTN who ran out of meds for about one week.  He has a bp of 156/72 here.  He has no complaints except for soreness.  He does not want any medication for a headache / no pain meds.  He is requesting hctz 12.5mg his usual dose.  I will give one dose now, he can take it again tomorrow sometime and then get back on daily regimen.  He was advised to return if he has worsening concerning symptoms.  He understands return precautions  and follow up instructions to which he will go the pcp 8/26.                      Clinical Impression:   Final diagnoses:  [I10] Hypertension, unspecified type (Primary)          ED Disposition Condition    Discharge Stable        ED Prescriptions     Medication Sig Dispense Start Date End Date Auth. Provider    hydroCHLOROthiazide (HYDRODIURIL) 12.5 MG Tab Take 1 tablet (12.5 mg total) by mouth once daily. 30 tablet 8/19/2022 9/18/2022 Dave Jay DO        Follow-up Information     Follow up With Specialties Details Why Contact Info Additional Information    [facility] Internal Medicine In 1 week As needed if you are unable to follow up with your primary physician. [address]  [facility] [address] [phone] [facility] Please park in surface lot and check in at central registration desk           Dave Jay DO  08/19/22 0013

## 2022-08-19 NOTE — DISCHARGE INSTRUCTIONS
You were seen in the ER for elevated blood pressure and prior headache.  You can take ibuprofen or acetaminophen if needed for your headache, or you can go back to taking your usual blood pressure medication.  I have prescribed this for you.  Follow up with your primary as you have scheduled or establish care with other team if needed as provided to you.

## 2022-08-23 ENCOUNTER — HOSPITAL ENCOUNTER (EMERGENCY)
Facility: HOSPITAL | Age: 62
Discharge: HOME | End: 2022-08-23
Attending: EMERGENCY MEDICINE
Payer: MEDICAID

## 2022-08-23 VITALS
DIASTOLIC BLOOD PRESSURE: 78 MMHG | WEIGHT: 240 LBS | SYSTOLIC BLOOD PRESSURE: 142 MMHG | RESPIRATION RATE: 18 BRPM | BODY MASS INDEX: 31.23 KG/M2 | OXYGEN SATURATION: 97 % | TEMPERATURE: 98 F | HEART RATE: 92 BPM

## 2022-08-23 DIAGNOSIS — M79.603 ARM PAIN: ICD-10-CM

## 2022-08-23 DIAGNOSIS — R09.81 NASAL CONGESTION: Primary | ICD-10-CM

## 2022-08-23 LAB — SARS-COV-2 RDRP RESP QL NAA+PROBE: NEGATIVE

## 2022-08-23 PROCEDURE — 93010 ELECTROCARDIOGRAM REPORT: CPT | Mod: ,,, | Performed by: INTERNAL MEDICINE

## 2022-08-23 PROCEDURE — 99283 EMERGENCY DEPT VISIT LOW MDM: CPT

## 2022-08-23 PROCEDURE — U0002 COVID-19 LAB TEST NON-CDC: HCPCS | Performed by: PHYSICIAN ASSISTANT

## 2022-08-23 PROCEDURE — 99284 PR EMERGENCY DEPT VISIT,LEVEL IV: ICD-10-PCS | Mod: CS,,, | Performed by: PHYSICIAN ASSISTANT

## 2022-08-23 PROCEDURE — 99284 EMERGENCY DEPT VISIT MOD MDM: CPT | Mod: CS,,, | Performed by: PHYSICIAN ASSISTANT

## 2022-08-23 PROCEDURE — 93010 EKG 12-LEAD: ICD-10-PCS | Mod: ,,, | Performed by: INTERNAL MEDICINE

## 2022-08-23 PROCEDURE — 93005 ELECTROCARDIOGRAM TRACING: CPT

## 2022-08-23 PROCEDURE — 25000003 PHARM REV CODE 250: Performed by: EMERGENCY MEDICINE

## 2022-08-23 RX ORDER — ACETAMINOPHEN 500 MG
1000 TABLET ORAL
Status: COMPLETED | OUTPATIENT
Start: 2022-08-23 | End: 2022-08-23

## 2022-08-23 RX ADMIN — ACETAMINOPHEN 1000 MG: 500 TABLET ORAL at 06:08

## 2022-08-23 NOTE — FIRST PROVIDER EVALUATION
Medical screening exam completed.  I have conducted a focused provider triage encounter, findings are as follows:    Brief history of present illness:  Patient is a poor historian, complaining of bilateral shoulder pain, multiple prior visits with very similar complaints, no recent trauma    Vitals:    08/23/22 1713   BP: (!) 142/78   BP Location: Left arm   Patient Position: Sitting   Pulse: 92   Resp: 18   Temp: 98.2 °F (36.8 °C)   TempSrc: Oral   SpO2: 97%   Weight: 108.9 kg (240 lb)       Pertinent physical exam:  Comfortable well-appearing    Brief workup plan:  EKG and Tylenol    Preliminary workup initiated; this workup will be continued and followed by the physician or advanced practice provider that is assigned to the patient when roomed.

## 2022-08-23 NOTE — Clinical Note
"Polo"Beth Small was seen and treated in our emergency department on 8/23/2022.  He may return to work on 08/24/2022.       If you have any questions or concerns, please don't hesitate to call.      Patricia Hauser PA-C"

## 2022-08-24 NOTE — ED PROVIDER NOTES
"Encounter Date: 8/23/2022       History     Chief Complaint   Patient presents with    Arm Pain     Bilateral arm pain and nasal congestion. Denies chest pain and SOB.      62-year-old male with history of hypertension, COPD, asthma presents to the emergency department for nasal congestion x2 days.  Patient denies any recent sick contacts.  Denies any sore throat, cough, fevers/chills, shortness of breath or chest pain.  Also complains of bilateral shoulder pain which she attributes to rotator cuff injury.  States that he is followed by his PCP for this and has a appointment this Friday.  Denies any acute changes.        Review of patient's allergies indicates:  No Known Allergies  Past Medical History:   Diagnosis Date    Arthritis     Asthma     BPH (benign prostatic hyperplasia)     COPD (chronic obstructive pulmonary disease)     Depression     Hepatitis C     Hypertension     Varicose veins of both lower extremities      No past surgical history on file.  Family History   Problem Relation Age of Onset    Cancer Mother     Heart disease Father      Social History     Tobacco Use    Smoking status: Current Every Day Smoker     Packs/day: 1.00     Years: 25.00     Pack years: 25.00     Types: Cigarettes    Smokeless tobacco: Never Used   Substance Use Topics    Alcohol use: Yes     Comment: 3-4 beers every other day    Drug use: Not Currently     Types: "Crack" cocaine     Review of Systems   Constitutional: Negative for chills and fever.   HENT: Positive for congestion. Negative for sore throat.    Respiratory: Negative for cough and shortness of breath.    Cardiovascular: Negative for chest pain.   Gastrointestinal: Negative for abdominal pain.   Genitourinary: Negative for difficulty urinating and dysuria.   Musculoskeletal: Negative.    Skin: Negative.    Neurological: Negative for weakness.   Psychiatric/Behavioral: Negative for confusion.       Physical Exam     Initial Vitals [08/23/22 1713] "   BP Pulse Resp Temp SpO2   (!) 142/78 92 18 98.2 °F (36.8 °C) 97 %      MAP       --         Physical Exam    Nursing note and vitals reviewed.  Constitutional: He appears well-developed and well-nourished. He is not diaphoretic. No distress.   HENT:   Head: Normocephalic and atraumatic.   Eyes: Conjunctivae are normal. Pupils are equal, round, and reactive to light.   Neck: Neck supple.   Normal range of motion.  Cardiovascular: Normal rate, regular rhythm, normal heart sounds and intact distal pulses. Exam reveals no gallop and no friction rub.    No murmur heard.  Pulmonary/Chest: Breath sounds normal.   Abdominal: Abdomen is soft. Bowel sounds are normal. There is no abdominal tenderness.   Musculoskeletal:         General: Normal range of motion.      Cervical back: Normal range of motion and neck supple.     Neurological: He is alert and oriented to person, place, and time. He has normal strength. GCS score is 15. GCS eye subscore is 4. GCS verbal subscore is 5. GCS motor subscore is 6.   Skin: Skin is warm and dry. Capillary refill takes less than 2 seconds.   Psychiatric: He has a normal mood and affect.         ED Course   Procedures  Labs Reviewed   SARS-COV-2 (COVID-19) QUALITATIVE PCR          Imaging Results    None          Medications   acetaminophen tablet 1,000 mg (1,000 mg Oral Given 8/23/22 1830)     Medical Decision Making:   History:   Old Medical Records: I decided to obtain old medical records.  Clinical Tests:   Lab Tests: Ordered and Reviewed       APC / Resident Notes:   62 y.o. year old male presenting with nasal congestion x2 days.  On exam patient is afebrile and nontoxic.  Heart rate and rhythm are regular. Lungs with clear breath sounds throughout. Abdomen is soft, nontender. No edema.    DDx includes but is not limited to COVID-19, viral syndrome, allergies    ED workup reveals will screen for COVID-19.  Patient is nontoxic appearing with reassuring vitals.  Patient prefers to  follow-up on portal for his COVID-19 result does not want to wait in the ED.  Lungs clear to auscultation no respiratory distress O2 saturation 97% on room air.  Advised to follow-up on portal and quarantine for 5 days of positive.  Patient verbalized agreement understanding with plan.  Also mentions bilateral shoulder pain which has been ongoing for years and denies any acute changes.  Reports that he has an appointment with his PCP this Friday which I feel this is reasonable given this is a chronic issue.  Discussed return precautions for any new or worsening symptoms.    Discussed findings and plan with patient who verbalized understanding and agrees with the plan and course of treatment. Return to ED precautions discussed. Patient is stable for discharge. I discussed the care of this patient with my supervising physician.                   Clinical Impression:   Final diagnoses:  [M79.603] Arm pain                 Patricia Hauser PA-C  08/23/22 2022       Patricia Hauser PA-C  08/23/22 2023

## 2022-08-24 NOTE — ED TRIAGE NOTES
"Pt reports bilateral shoulder and arm pain that has been re-occurring for the past 3 years. Pt unable to describe why pain is different today. Pt reports tingling to bilateral arms intermittently. 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    Past Medical History:   Diagnosis Date    Arthritis     Asthma     BPH (benign prostatic hyperplasia)     COPD (chronic obstructive pulmonary disease)     Depression     Hepatitis C     Hypertension     Varicose veins of both lower extremities        No past surgical history on file.    Family History   Problem Relation Age of Onset    Cancer Mother     Heart disease Father        Social History     Socioeconomic History    Marital status: Single   Tobacco Use    Smoking status: Current Every Day Smoker     Packs/day: 1.00     Years: 25.00     Pack years: 25.00     Types: Cigarettes    Smokeless tobacco: Never Used   Substance and Sexual Activity    Alcohol use: Yes     Comment: 3-4 beers every other day    Drug use: Not Currently     Types: "Crack" cocaine       No current facility-administered medications for this encounter.     Current Outpatient Medications   Medication Sig Dispense Refill    acetaminophen (TYLENOL) 325 MG tablet Take 2 tablets (650 mg total) by mouth every 6 (six) hours as needed. 30 tablet 0    amLODIPine (NORVASC) 5 MG tablet Take 1 tablet (5 mg total) by mouth once daily. 30 tablet 0    budesonide-formoterol 80-4.5 mcg (SYMBICORT) 80-4.5 mcg/actuation HFAA Inhale 2 puffs into the lungs. Controller      citalopram (CELEXA) 10 MG tablet Take 10 mg by mouth once daily.      hydroCHLOROthiazide (HYDRODIURIL) 12.5 MG Tab Take 1 tablet (12.5 mg total) by mouth once daily. 30 tablet 0    LIDOcaine (LIDODERM) 5 % Place 1 patch onto the skin once daily. Remove & Discard patch within 12 hours or as directed by MD for 15 doses 15 patch 0    tamsulosin (FLOMAX) 0.4 mg Cap Take 0.4 mg by mouth once daily.         Review of patient's allergies indicates:  No " Known Allergies

## 2022-08-30 NOTE — PROGRESS NOTES
ED Navigator attempted to contact patient on 3 or more separate occasions, patient is unable to reach. ED Navigator to close encounter at this time.      Julissa Lewis, ED Navigator, [facility]  [phone], ext. [phone]

## 2022-09-07 ENCOUNTER — HOSPITAL ENCOUNTER (EMERGENCY)
Facility: HOSPITAL | Age: 62
Discharge: HOME | End: 2022-09-07
Attending: EMERGENCY MEDICINE
Payer: MEDICAID

## 2022-09-07 VITALS
WEIGHT: 240 LBS | SYSTOLIC BLOOD PRESSURE: 140 MMHG | RESPIRATION RATE: 16 BRPM | HEIGHT: 73 IN | OXYGEN SATURATION: 98 % | TEMPERATURE: 99 F | BODY MASS INDEX: 31.81 KG/M2 | HEART RATE: 90 BPM | DIASTOLIC BLOOD PRESSURE: 87 MMHG

## 2022-09-07 DIAGNOSIS — M25.512 CHRONIC PAIN OF BOTH SHOULDERS: Primary | ICD-10-CM

## 2022-09-07 DIAGNOSIS — M25.511 CHRONIC PAIN OF BOTH SHOULDERS: Primary | ICD-10-CM

## 2022-09-07 DIAGNOSIS — G89.29 CHRONIC PAIN OF BOTH SHOULDERS: Primary | ICD-10-CM

## 2022-09-07 PROCEDURE — 99283 EMERGENCY DEPT VISIT LOW MDM: CPT

## 2022-09-07 PROCEDURE — 25000003 PHARM REV CODE 250: Performed by: EMERGENCY MEDICINE

## 2022-09-07 PROCEDURE — 99284 EMERGENCY DEPT VISIT MOD MDM: CPT | Mod: ,,, | Performed by: EMERGENCY MEDICINE

## 2022-09-07 PROCEDURE — 99284 PR EMERGENCY DEPT VISIT,LEVEL IV: ICD-10-PCS | Mod: ,,, | Performed by: EMERGENCY MEDICINE

## 2022-09-07 RX ORDER — CYCLOBENZAPRINE HCL 10 MG
10 TABLET ORAL 3 TIMES DAILY PRN
Qty: 15 TABLET | Refills: 0 | Status: SHIPPED | OUTPATIENT
Start: 2022-09-07 | End: 2022-09-12

## 2022-09-07 RX ORDER — ACETAMINOPHEN 500 MG
1000 TABLET ORAL
Status: COMPLETED | OUTPATIENT
Start: 2022-09-07 | End: 2022-09-07

## 2022-09-07 RX ADMIN — ACETAMINOPHEN 1000 MG: 500 TABLET ORAL at 02:09

## 2022-09-07 NOTE — Clinical Note
"Polo"Beth Small was seen and treated in our emergency department on 9/7/2022.  He may return to work on 09/10/2022.       If you have any questions or concerns, please don't hesitate to call.      Ravi Celestin MD"

## 2022-09-07 NOTE — ED PROVIDER NOTES
Source of History   Patient    Chief Complaint   Shoulder Pain (Pt c/o bilateral shoulder pain x 3 days )      History Of Present Illness   Polo Small is a 62 y.o. male presenting with bilateral shoulder pain that has been present for 3 days.  It is really in his back between his neck and shoulders.  He states that he gets pains in this area frequently.  He was seen at another ED several days ago for similar issues, but lost the muscle relaxer prescription.  He does a lot of lifting for work.    Review Of Systems   As per HPI and below:  General: No fever.  No chills.  Neck: No neck pain.  Back: Notes upper back pain.  Extremities: No extremity pain.  Chest: No shortness of breath.  No chest pain.  Cardiovascular: No palpitations.  Abdomen: No abdominal pain.  No nausea or vomiting.  Integument: No rashes or bruising.  Neurologic: No numbness.  No focal weakness.      Review of patient's allergies indicates:  No Known Allergies    No current facility-administered medications on file prior to encounter.     Current Outpatient Medications on File Prior to Encounter   Medication Sig Dispense Refill    acetaminophen (TYLENOL) 325 MG tablet Take 2 tablets (650 mg total) by mouth every 6 (six) hours as needed. 30 tablet 0    amLODIPine (NORVASC) 5 MG tablet Take 1 tablet (5 mg total) by mouth once daily. 30 tablet 0    budesonide-formoterol 80-4.5 mcg (SYMBICORT) 80-4.5 mcg/actuation HFAA Inhale 2 puffs into the lungs. Controller      citalopram (CELEXA) 10 MG tablet Take 10 mg by mouth once daily.      hydroCHLOROthiazide (HYDRODIURIL) 12.5 MG Tab Take 1 tablet (12.5 mg total) by mouth once daily. 30 tablet 0    tamsulosin (FLOMAX) 0.4 mg Cap Take 0.4 mg by mouth once daily.         Past History   As per HPI and below:  Past Medical History:   Diagnosis Date    Arthritis     Asthma     BPH (benign prostatic hyperplasia)     COPD (chronic obstructive pulmonary disease)     Depression     Hepatitis C     Hypertension  "    Varicose veins of both lower extremities      No past surgical history on file.    Social History     Socioeconomic History    Marital status: Single   Tobacco Use    Smoking status: Every Day     Packs/day: 1.00     Years: 25.00     Pack years: 25.00     Types: Cigarettes    Smokeless tobacco: Never   Substance and Sexual Activity    Alcohol use: Yes     Comment: 3-4 beers every other day    Drug use: Not Currently     Types: "Crack" cocaine       Family History   Problem Relation Age of Onset    Cancer Mother     Heart disease Father        Physical Exam     Vitals:    09/07/22 0038   BP: (!) 144/86   Pulse: 95   Resp: 16   Temp: 99 °F (37.2 °C)   TempSrc: Oral   SpO2: 97%   Weight: 108.9 kg (240 lb)   Height: 6' 1" (1.854 m)     Appearance: No acute distress.  Skin: No rashes seen.  Good turgor.  No abrasions.  No ecchymoses.  Eyes: No conjunctival injection.  ENT: Oropharynx clear.    Chest: Clear to auscultation bilaterally.  Good air movement.  No wheezes.  No rhonchi.  Cardiovascular: Regular rate and rhythm.  No murmurs. No gallops. No rubs.  Abdomen: Soft.  Not distended.  Nontender.  No guarding.  No rebound.  Musculoskeletal: Good range of motion all joints.  No deformities.  Neck supple.  No meningismus. mild bilateral upper back/shoulder muscle tenderness with no bony tenderness.  Neurologic: Motor intact.  Sensation intact.  Cerebellar intact.  Cranial nerves intact.  Mental Status:  Alert and oriented x 3.  Appropriate, conversant.          I decided to obtain the patient's medical records.    Medications   acetaminophen tablet 1,000 mg (has no administration in time range)       Results and Course   Labs Reviewed - No data to display    Imaging Results    None                  MDM, Impression and Plan   62 y.o. male with chronic upper back/shoulder muscular pain between the neck and the acromion.  The deltoid muscles are not involved.  Strength is intact in both arms including intrinsic hand " muscles.  No radicular symptoms. I do not see any indication for imaging.  I have given the patient a note for work and recommend OTC pain med of choice.  I will give a few muscle relaxers.  Recommend follow-up with the PCP.         Final diagnoses:  [M25.511, G89.29, M25.512] Chronic pain of both shoulders (Primary)        ED Disposition Condition    Discharge Stable          ED Prescriptions       Medication Sig Dispense Start Date End Date Auth. Provider    cyclobenzaprine (FLEXERIL) 10 MG tablet Take 1 tablet (10 mg total) by mouth 3 (three) times daily as needed for Muscle spasms. 15 tablet 9/7/2022 9/12/2022 Ravi Celestin MD          Follow-up Information       Follow up With Specialties Details Why Contact Info    Your primary care doctor 1-2 weeks                   Ravi Celestin MD  09/07/22 3284

## 2022-09-07 NOTE — ED TRIAGE NOTES
Polo Small, a 62 y.o. male presents to the ED w/ complaint of bilat shoulder pain x 3 days. 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 Now experiencing pain in both shoulders - requesting muscle relaxer refill. Denies any other complaints.     Triage note:  Chief Complaint   Patient presents with    Shoulder Pain     Pt c/o bilateral shoulder pain x 3 days      Review of patient's allergies indicates:  No Known Allergies  Past Medical History:   Diagnosis Date    Arthritis     Asthma     BPH (benign prostatic hyperplasia)     COPD (chronic obstructive pulmonary disease)     Depression     Hepatitis C     Hypertension     Varicose veins of both lower extremities      LOC: The patient is awake, alert, and oriented to self, place, time, and situation. Pt is calm and cooperative. Affect is appropriate.  Speech is appropriate and clear.     APPEARANCE: Patient resting comfortably in no acute distress.  Patient is clean and well groomed.    SKIN: The skin is warm and dry; color consistent with ethnicity.  Patient has normal skin turgor and moist mucus membranes.  Skin intact; no breakdown or bruising noted.     MUSCULOSKELETAL: Patient moving bilat upper extremities with difficulty; reports pain in bilat shoulders.  Denies weakness.     RESPIRATORY: Airway is open and patent. Respirations spontaneous, even, easy, and non-labored.  Patient has a normal effort and rate.  No accessory muscle use noted. Denies cough.     CARDIAC:  Normal rate noted.  No peripheral edema noted. No complaints of chest pain.      ABDOMEN: Soft and non tender to palpation.  No distention noted. Pt denies abdominal pain; denies nausea, vomiting, diarrhea, or constipation.    NEUROLOGIC: Eyes open spontaneously.  Behavior appropriate to situation.  Follows commands; facial expression symmetrical.  Purposeful motor response noted; normal sensation in all extremities. Pt denies headache; denies lightheadedness or  dizziness; denies visual disturbances; denies loss of balance; denies unilateral weakness.

## 2022-10-17 ENCOUNTER — HOSPITAL ENCOUNTER (EMERGENCY)
Facility: HOSPITAL | Age: 62
Discharge: HOME | End: 2022-10-18
Attending: EMERGENCY MEDICINE
Payer: MEDICAID

## 2022-10-17 VITALS
BODY MASS INDEX: 31.81 KG/M2 | HEIGHT: 73 IN | SYSTOLIC BLOOD PRESSURE: 169 MMHG | DIASTOLIC BLOOD PRESSURE: 91 MMHG | OXYGEN SATURATION: 98 % | TEMPERATURE: 98 F | RESPIRATION RATE: 16 BRPM | HEART RATE: 87 BPM | WEIGHT: 240 LBS

## 2022-10-17 DIAGNOSIS — M54.2 NECK PAIN: Primary | ICD-10-CM

## 2022-10-17 DIAGNOSIS — I10 HYPERTENSION: ICD-10-CM

## 2022-10-17 PROCEDURE — 99284 EMERGENCY DEPT VISIT MOD MDM: CPT | Mod: 25

## 2022-10-17 PROCEDURE — 93010 EKG 12-LEAD: ICD-10-PCS | Mod: ,,, | Performed by: INTERNAL MEDICINE

## 2022-10-17 PROCEDURE — 93005 ELECTROCARDIOGRAM TRACING: CPT

## 2022-10-17 PROCEDURE — 99284 PR EMERGENCY DEPT VISIT,LEVEL IV: ICD-10-PCS | Mod: ,,, | Performed by: EMERGENCY MEDICINE

## 2022-10-17 PROCEDURE — 99284 EMERGENCY DEPT VISIT MOD MDM: CPT | Mod: ,,, | Performed by: EMERGENCY MEDICINE

## 2022-10-17 PROCEDURE — 93010 ELECTROCARDIOGRAM REPORT: CPT | Mod: ,,, | Performed by: INTERNAL MEDICINE

## 2022-10-17 RX ORDER — KETOROLAC TROMETHAMINE 30 MG/ML
10 INJECTION, SOLUTION INTRAMUSCULAR; INTRAVENOUS
Status: COMPLETED | OUTPATIENT
Start: 2022-10-18 | End: 2022-10-18

## 2022-10-17 RX ORDER — LIDOCAINE 50 MG/G
1 PATCH TOPICAL
Status: DISCONTINUED | OUTPATIENT
Start: 2022-10-18 | End: 2022-10-18 | Stop reason: HOSPADM

## 2022-10-17 NOTE — Clinical Note
"Polo"Beth Small was seen and treated in our emergency department on 10/17/2022.  He may return to work on 10/20/2022.       If you have any questions or concerns, please don't hesitate to call.      Dave Cruz MD"

## 2022-10-18 PROCEDURE — 25000003 PHARM REV CODE 250: Performed by: STUDENT IN AN ORGANIZED HEALTH CARE EDUCATION/TRAINING PROGRAM

## 2022-10-18 PROCEDURE — 96372 THER/PROPH/DIAG INJ SC/IM: CPT | Performed by: STUDENT IN AN ORGANIZED HEALTH CARE EDUCATION/TRAINING PROGRAM

## 2022-10-18 PROCEDURE — 63600175 PHARM REV CODE 636 W HCPCS: Performed by: STUDENT IN AN ORGANIZED HEALTH CARE EDUCATION/TRAINING PROGRAM

## 2022-10-18 RX ORDER — LIDOCAINE 50 MG/G
1 PATCH TOPICAL DAILY
Qty: 3 PATCH | Refills: 0 | Status: SHIPPED | OUTPATIENT
Start: 2022-10-18 | End: 2022-10-21

## 2022-10-18 RX ORDER — CYCLOBENZAPRINE HCL 10 MG
5 TABLET ORAL 3 TIMES DAILY PRN
Qty: 15 TABLET | Refills: 0 | Status: SHIPPED | OUTPATIENT
Start: 2022-10-18 | End: 2022-10-23

## 2022-10-18 RX ADMIN — KETOROLAC TROMETHAMINE 10 MG: 30 INJECTION, SOLUTION INTRAMUSCULAR; INTRAVENOUS at 12:10

## 2022-10-18 RX ADMIN — LIDOCAINE 1 PATCH: 50 PATCH CUTANEOUS at 12:10

## 2022-10-18 NOTE — ED TRIAGE NOTES
Polo Small, an 62 y.o. male presents to the ED from home. C/o a stiff/sore neck since Friday.       Chief Complaint   Patient presents with    Neck Pain     Pt reports neck pain that started Friday. 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 Neck mobility intact in triage. Denies fevers/chills.      Review of patient's allergies indicates:  No Known Allergies  Past Medical History:   Diagnosis Date    Arthritis     Asthma     BPH (benign prostatic hyperplasia)     COPD (chronic obstructive pulmonary disease)     Depression     Hepatitis C     Hypertension     Varicose veins of both lower extremities

## 2022-10-18 NOTE — ED NOTES
Pt refused to have blood labs taken. He would like to have the EKG performed.  MD notified and aware.

## 2022-10-18 NOTE — ED NOTES
Pt refused X ray and repeat vital signs. Pt requested discharge paperwork so he can leave. MD notified.

## 2022-10-18 NOTE — ED PROVIDER NOTES
"Encounter Date: 10/17/2022       History     Chief Complaint   Patient presents with    Neck Pain     Pt reports neck pain that started Friday. 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 Neck mobility intact in triage. Denies fevers/chills.      Mr. Small is a 62-year-old male with past medical history of asthma, BPH, COPD and hypertension who presents to the emergency department due to hypertension as well as neck pain.  Patient states that since friday he has had an aching pain in his neck which is worse when he looks from left to right. He states that he was shoveling some asphalt on Friday and that is around the time the pain started. He denies any direct neck trauma. Patient also states that he has noticed that over the past few days his blood pressure has progressively been increasing.  Patient also states that he thinks he may have inhaled some of asphalt.  He denies any fever, chills, nausea, vomiting, chest pain, abdominal pain or shortness of breath.        Review of patient's allergies indicates:  No Known Allergies  Past Medical History:   Diagnosis Date    Arthritis     Asthma     BPH (benign prostatic hyperplasia)     COPD (chronic obstructive pulmonary disease)     Depression     Hepatitis C     Hypertension     Varicose veins of both lower extremities      History reviewed. No pertinent surgical history.  Family History   Problem Relation Age of Onset    Cancer Mother     Heart disease Father      Social History     Tobacco Use    Smoking status: Every Day     Packs/day: 1.00     Years: 25.00     Pack years: 25.00     Types: Cigarettes    Smokeless tobacco: Never   Substance Use Topics    Alcohol use: Yes     Comment: 3-4 beers every other day    Drug use: Not Currently     Types: "Crack" cocaine     Review of Systems   Constitutional:  Positive for fatigue. Negative for chills, diaphoresis and fever.   HENT:  Negative for congestion, rhinorrhea and sore throat.    Eyes:  Negative " for visual disturbance.   Respiratory:  Negative for cough, chest tightness and shortness of breath.    Cardiovascular:  Negative for chest pain.   Gastrointestinal:  Negative for abdominal pain, blood in stool, constipation, diarrhea and vomiting.   Genitourinary:  Negative for dysuria, hematuria and urgency.   Musculoskeletal:  Positive for neck pain. Negative for back pain.   Skin:  Negative for rash.   Neurological:  Negative for seizures and syncope.   Hematological:  Does not bruise/bleed easily.   Psychiatric/Behavioral:  Negative for agitation and hallucinations.      Physical Exam     Initial Vitals [10/17/22 2007]   BP Pulse Resp Temp SpO2   (!) 169/91 87 16 98.3 °F (36.8 °C) 98 %      MAP       --             Physical Exam     Nursing note and vitals reviewed.  Constitutional: Patient appears well-developed and well-nourished. No distress. AxOx3, NAD, well nourished, appears stated age  HENT:   Head: Normocephalic and atraumatic.   Eyes: Conjunctivae and EOM are normal. Pupils are equal, round, and reactive to light. no scleral icterus, no periorbital edema or ecchymosis  Neck: Neck supple. no stridor, no masses, no drooling or voice changes  Normal range of motion.  Paraspinal muscle tenderness but no midline cervical spine tenderness  Cardiovascular: Normal rate, regular rhythm, normal heart sounds and intact distal pulses. no m/r/g  Pulmonary/Chest: Breath sounds normal. CTAB, no wheezes, rales or rhonchi, no increased work of breathing  Abdominal: Abdomen is soft. Patient exhibits no distension. There is no abdominal tenderness. no organomegaly, no CVAT  Musculoskeletal:      Cervical back: Normal range of motion and neck supple.   Neurological: Patient is alert and oriented to person, place, and time. No cranial nerve deficit. Gait normal. GCS score is 15. Moving all extremities, gait intact, face grossly symmetric  Skin: Skin is warm and dry.  Ext: no edema, no lesions, rashes, or deformity  Psych:  Normal mood/affect,cooperative, well groomed, makes good eye contact      ED Course   Procedures  Labs Reviewed - No data to display    EKG Readings: (Independently Interpreted)   Initial Reading: No STEMI. Rhythm: Normal Sinus Rhythm. Heart Rate: 68.   Normal sinus rhythm     Imaging Results    None          Medications   ketorolac injection 9.999 mg (9.999 mg Intramuscular Given 10/18/22 0003)     Medical Decision Making:   Initial Assessment:   Mr. Small is a 62-year-old male with past medical history of asthma, BPH, COPD and hypertension who presents to the emergency department due to hypertension as well as neck pain.  Differential Diagnosis:   Neck sprain  Fracture/dislocation  Hypertensive emergency  Hypertensive urgency  ED Management:  Patient was thoroughly examined,  He did not have any midline tenderness but did have some paraspinal muscle tenderness so patient was given lidocaine patches well as pain medication and on reassessment stated that his neck pain had resolved.  Patient initially had concerns over his hypertension and so my plan was to get labs but patient stated that he did not want any labs and that he was show he could follow up closely with his primary care physician concerning his blood pressure.  Patient also declined to have a chest x-ray to evaluate for inhalation of asphalt he stated that he was not having any chest pain or shortness of breath and so he was not concerned.  I felt that patient was stable for discharge since his neck pain had improved and he had close follow-up with his primary care physician.  He was discharged in stable condition and return precautions were given.           Attending Attestation:   Physician Attestation Statement for Resident:  As the supervising MD   Physician Attestation Statement: I have personally seen and examined this patient.   I agree with the above history.  -:   As the supervising MD I agree with the above PE.     As the supervising MD I  agree with the above treatment, course, plan, and disposition.                Attending ED Notes:   STAFF ATTENDING PHYSICIAN NOTE:  I have individually/jointly evaluated Polo Small and discussed their ED management with the resident physician. I have also reviewed their notes, assessments, and procedures documented.  I was present during all critical portions of any procedure(s) performed on Polo Small.   ____________________  Eben James MD, [facility]  Emergency Medicine Staff                   Clinical Impression:   Final diagnoses:  [M54.2] Neck pain (Primary)  [I10] Hypertension      ED Disposition Condition    Discharge Stable          ED Prescriptions       Medication Sig Dispense Start Date End Date Auth. Provider    LIDOcaine (LIDODERM) 5 % Place 1 patch onto the skin once daily. Remove & Discard patch within 12 hours or as directed by MD for 3 days 3 patch 10/18/2022 10/21/2022 Dave Cruz MD    cyclobenzaprine (FLEXERIL) 10 MG tablet Take 0.5 tablets (5 mg total) by mouth 3 (three) times daily as needed for Muscle spasms. 15 tablet 10/18/2022 10/23/2022 Dave Cruz MD          Follow-up Information    None          Dave Cruz MD  Resident  10/18/22 0227       Kieran James MD  10/18/22 0330

## 2022-11-07 ENCOUNTER — HOSPITAL ENCOUNTER (EMERGENCY)
Facility: HOSPITAL | Age: 62
Discharge: HOME | End: 2022-11-07
Attending: EMERGENCY MEDICINE
Payer: MEDICAID

## 2022-11-07 VITALS
RESPIRATION RATE: 16 BRPM | OXYGEN SATURATION: 98 % | TEMPERATURE: 98 F | HEART RATE: 84 BPM | WEIGHT: 240 LBS | BODY MASS INDEX: 31.81 KG/M2 | SYSTOLIC BLOOD PRESSURE: 144 MMHG | HEIGHT: 73 IN | DIASTOLIC BLOOD PRESSURE: 87 MMHG

## 2022-11-07 DIAGNOSIS — G89.29 CHRONIC PAIN OF BOTH SHOULDERS: Primary | ICD-10-CM

## 2022-11-07 DIAGNOSIS — M25.512 CHRONIC PAIN OF BOTH SHOULDERS: Primary | ICD-10-CM

## 2022-11-07 DIAGNOSIS — M25.511 CHRONIC PAIN OF BOTH SHOULDERS: Primary | ICD-10-CM

## 2022-11-07 PROCEDURE — 63600175 PHARM REV CODE 636 W HCPCS: Performed by: EMERGENCY MEDICINE

## 2022-11-07 PROCEDURE — 96372 THER/PROPH/DIAG INJ SC/IM: CPT | Performed by: EMERGENCY MEDICINE

## 2022-11-07 PROCEDURE — 99284 EMERGENCY DEPT VISIT MOD MDM: CPT

## 2022-11-07 PROCEDURE — 99284 PR EMERGENCY DEPT VISIT,LEVEL IV: ICD-10-PCS | Mod: ,,, | Performed by: EMERGENCY MEDICINE

## 2022-11-07 PROCEDURE — 99284 EMERGENCY DEPT VISIT MOD MDM: CPT | Mod: ,,, | Performed by: EMERGENCY MEDICINE

## 2022-11-07 RX ORDER — KETOROLAC TROMETHAMINE 30 MG/ML
15 INJECTION, SOLUTION INTRAMUSCULAR; INTRAVENOUS
Status: COMPLETED | OUTPATIENT
Start: 2022-11-07 | End: 2022-11-07

## 2022-11-07 RX ADMIN — KETOROLAC TROMETHAMINE 15 MG: 30 INJECTION, SOLUTION INTRAMUSCULAR; INTRAVENOUS at 05:11

## 2022-11-07 NOTE — FIRST PROVIDER EVALUATION
"Medical screening examination initiated.  I have conducted a focused provider triage encounter, findings are as follows:    Brief history of present illness:  63yo M presents acute exacerbation of chronic shoulder pain. He reports repetitive lifting at work w/ pain since 2020. Visits for same in 8/22, 9/22. No numbness or weakness    Vitals:    11/07/22 1522   BP: (!) 178/84   Pulse: 91   Resp: 18   Temp: 98.1 °F (36.7 °C)   TempSrc: Oral   SpO2: 98%   Weight: 108.9 kg (240 lb)   Height: 6' 0.5" (1.842 m)       Pertinent physical exam:  NVIDI to CAIN    Brief workup plan:  patient's chief concern is work excuse to be able to see his primary care and a possible refill of pain medication    Preliminary workup initiated; this workup will be continued and followed by the physician or advanced practice provider that is assigned to the patient when roomed.  "

## 2022-11-07 NOTE — ED NOTES
Pt reports that in 2021 he injured both of his shoulders by lifting heavy garbage cans. Pt states at that time the pain went away.  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 Pt states that in the past has had an injection that helped.  Pain increases with lifting arms and looking down.  Pt reports + intermittent tingling to arms.    LOC: The patient is awake, alert and aware of environment with an appropriate affect, the patient is oriented x 3 and speaking appropriately.  APPEARANCE: Patient resting comfortably and in no acute distress, patient is clean and well groomed, patient's clothing is properly fastened.  SKIN: The skin is warm and dry, color consistent with ethnicity, patient has normal skin turgor and moist mucus membranes, skin intact, no breakdown or bruising noted.  MUSCULOSKELETAL: Patient moving all extremities spontaneously, no obvious swelling or deformities noted.  RESPIRATORY: Airway is open and patent, respirations are spontaneous, patient has a normal effort and rate, no accessory muscle use noted.  ABDOMEN: Soft and non tender to palpation, no distention noted.

## 2022-11-07 NOTE — ED PROVIDER NOTES
"Encounter Date: 11/7/2022    SCRIBE #1 NOTE: I, Kamala Salcido, am scribing for, and in the presence of,  Shana Peres MD. I have scribed the following portions of the note - Other sections scribed: HPI, ROS.     History     Chief Complaint   Patient presents with    Shoulder Pain     Both shoulder been hurting few yrs from lifting     Time patient was seen by the provider: 5:42 PM      The patient is a 62 y.o. male with past medical history of Hep C, asthma, HTN, COPD who presents to the ED with a complaint of bilateral shoulder pain for the last few years which he believes to be from moving heavy objects at work. He does report some intermittent numbness in his hands. He has not seen a PCP or orthopedic surgeon for the pain and has not been taking pain medication at home. He would like a work note and pain relief.    The history is provided by the patient and medical records. No  was used.   Review of patient's allergies indicates:  No Known Allergies  Past Medical History:   Diagnosis Date    Arthritis     Asthma     BPH (benign prostatic hyperplasia)     COPD (chronic obstructive pulmonary disease)     Depression     Hepatitis C     Hypertension     Varicose veins of both lower extremities      No past surgical history on file.  Family History   Problem Relation Age of Onset    Cancer Mother     Heart disease Father      Social History     Tobacco Use    Smoking status: Every Day     Packs/day: 1.00     Years: 25.00     Pack years: 25.00     Types: Cigarettes    Smokeless tobacco: Never   Substance Use Topics    Alcohol use: Yes     Comment: 3-4 beers every other day    Drug use: Not Currently     Types: "Crack" cocaine     Review of Systems   Constitutional:  Negative for chills, diaphoresis, fatigue and fever.   HENT:  Negative for congestion, rhinorrhea and sore throat.    Eyes:  Negative for visual disturbance.   Respiratory:  Negative for cough, chest tightness and shortness of " breath.    Cardiovascular:  Negative for chest pain.   Gastrointestinal:  Negative for abdominal pain, blood in stool, constipation, diarrhea and vomiting.   Genitourinary:  Negative for dysuria, hematuria and urgency.   Musculoskeletal:  Negative for back pain.        +bilateral shoulder pain   Skin:  Negative for rash.   Neurological:  Positive for numbness (intermittent; bilateral hands). Negative for seizures and syncope.   Hematological:  Does not bruise/bleed easily.   Psychiatric/Behavioral:  Negative for agitation and hallucinations.      Physical Exam     Initial Vitals [11/07/22 1522]   BP Pulse Resp Temp SpO2   (!) 178/84 91 18 98.1 °F (36.7 °C) 98 %      MAP       --         Physical Exam    Gen: AxOx4, NAD, appears stated age, well appearing  Eye: EOMI, no scleral icterus, no periorbital edema or ecchymosis  Head: Normocephalic, atraumatic, no lesions, scalp grossly normal  ENT: neck supple, no stridor, no masses, no abnormal phonation or drooling  CVS: warm and well perfused, cap refill <2 seconds, no extremity pallor, bilateral radial pulses 2+  PULM: normal work of breathing, no stridor, equal chest rise, no peripheral cyanosis  ABD: scaphoid, nondistended  Ext: FROM bilateral shoulders, no TTP, no rash, no deformities, moving all joints with normal ROM  Neuro: PAZ, gait intact, face grossly symmetric  Psych: well groomed, mood and affect congruent, makes good eye contact, cooperative      ED Course   Procedures  Labs Reviewed - No data to display         Imaging Results    None          Medications   ketorolac injection 15 mg (15 mg Intramuscular Given 11/7/22 2852)     Medical Decision Making:   History:   Old Medical Records: I decided to obtain old medical records.  Initial Assessment:   This is a 62-year-old man who presents with chronic bilateral shoulder plain who is requesting a work note for accommodations at work, and referrals to primary care and Orthopedics.  Do not think there is any  emergent etiology, do not think imaging is indicated.  I do not think he has septic arthritis bilaterally.  Plan for Toradol, OTC analgesics at home, work note and referral.        Scribe Attestation:   Scribe #1: I performed the above scribed service and the documentation accurately describes the services I performed. I attest to the accuracy of the note.                   Clinical Impression:   Final diagnoses:  [M25.511, G89.29, M25.512] Chronic pain of both shoulders (Primary)      ED Disposition Condition    Discharge Stable          ED Prescriptions    None       Follow-up Information       Follow up With Specialties Details Why Contact Info    [facility] Surgical Oncology, Orthopedic Surgery, Genetics, Physical Medicine and Rehabilitation, Occupational Therapy, Radiology   [address] [address]  [phone]      [facility] - Emergency Dept Emergency Medicine  If symptoms worsen [address] [id number] [phone]             Shana Peres MD  11/07/22 4420

## 2022-11-14 ENCOUNTER — HOSPITAL ENCOUNTER (EMERGENCY)
Facility: HOSPITAL | Age: 62
Discharge: HOME | End: 2022-11-14
Attending: EMERGENCY MEDICINE
Payer: MEDICAID

## 2022-11-14 VITALS
BODY MASS INDEX: 32.47 KG/M2 | HEIGHT: 73 IN | SYSTOLIC BLOOD PRESSURE: 148 MMHG | OXYGEN SATURATION: 97 % | TEMPERATURE: 98 F | RESPIRATION RATE: 18 BRPM | WEIGHT: 245 LBS | HEART RATE: 74 BPM | DIASTOLIC BLOOD PRESSURE: 81 MMHG

## 2022-11-14 DIAGNOSIS — Z02.89 ENCOUNTER TO OBTAIN EXCUSE FROM WORK: Primary | ICD-10-CM

## 2022-11-14 PROCEDURE — 99281 EMR DPT VST MAYX REQ PHY/QHP: CPT

## 2022-11-14 PROCEDURE — 99282 EMERGENCY DEPT VISIT SF MDM: CPT | Mod: ,,, | Performed by: PHYSICIAN ASSISTANT

## 2022-11-14 PROCEDURE — 99282 PR EMERGENCY DEPT VISIT,LEVEL II: ICD-10-PCS | Mod: ,,, | Performed by: PHYSICIAN ASSISTANT

## 2022-11-14 NOTE — Clinical Note
"Polo"Beth Small was seen and treated in our emergency department on 11/14/2022.  He may return to work on 11/14/2022.  Patient can return to work for regular duty with no restrictions.     If you have any questions or concerns, please don't hesitate to call.      Columba Mcclendon PA-C"

## 2022-11-14 NOTE — ED NOTES
Patient arrived to [facility], assisted to a recliner, Patient updated on plan of care, VS stable. No additional needs at this time.

## 2022-11-14 NOTE — ED TRIAGE NOTES
Patient presents to the ER for a work note, patient was seen here on 10/25/22 and was sent home with a work note for limitations due to bilateral shoulder pain. Patient wants to get a work note so he can return to work without limitations. Patient has no complaints at this time.

## 2022-11-15 NOTE — ED PROVIDER NOTES
"Encounter Date: 11/14/2022       History     Chief Complaint   Patient presents with    Letter for School/Work     This is a 62 year old male with a PMH of HTN, COPD, Hep C presenting to the ED requesting return to work clearance. He was recently seen with bilateral shoulder pain. His symptoms have improve and he wishes to return to work sooner than his work note allows for. He denies fever, chills, URI symptoms, chest pain, back pain, SOB, nausea/vomiting or any new concerning symptoms.    Review of patient's allergies indicates:  No Known Allergies  Past Medical History:   Diagnosis Date    Arthritis     Asthma     BPH (benign prostatic hyperplasia)     COPD (chronic obstructive pulmonary disease)     Depression     Hepatitis C     Hypertension     Varicose veins of both lower extremities      History reviewed. No pertinent surgical history.  Family History   Problem Relation Age of Onset    Cancer Mother     Heart disease Father      Social History     Tobacco Use    Smoking status: Every Day     Packs/day: 1.00     Years: 25.00     Pack years: 25.00     Types: Cigarettes    Smokeless tobacco: Never   Substance Use Topics    Alcohol use: Yes     Comment: 3-4 beers every other day    Drug use: Not Currently     Types: "Crack" cocaine     Review of Systems   Constitutional:  Negative for chills and fever.   HENT:  Negative for sore throat.    Respiratory:  Negative for shortness of breath.    Cardiovascular:  Negative for chest pain.   Gastrointestinal:  Negative for nausea.   Genitourinary:  Negative for dysuria.   Musculoskeletal:  Negative for back pain.   Skin:  Negative for rash.   Neurological:  Negative for weakness.   Hematological:  Does not bruise/bleed easily.     Physical Exam     Initial Vitals [11/14/22 0933]   BP Pulse Resp Temp SpO2   (!) 175/104 87 16 98.8 °F (37.1 °C) 96 %      MAP       --         Physical Exam    Constitutional: He appears well-developed and well-nourished. No distress.   HENT: "   Head: Atraumatic.   Eyes: Conjunctivae and EOM are normal. Pupils are equal, round, and reactive to light.   Cardiovascular:  Normal rate, regular rhythm and normal heart sounds.           Pulmonary/Chest: Breath sounds normal. No respiratory distress. He has no wheezes. He has no rhonchi. He has no rales.   Abdominal: Abdomen is soft. Bowel sounds are normal. There is no abdominal tenderness.     Neurological: He is alert and oriented to person, place, and time.   Skin: Skin is warm and dry. No rash noted.       ED Course   Procedures  Labs Reviewed - No data to display       Imaging Results    None          Medications - No data to display  Medical Decision Making:   History:   Old Medical Records: I decided to obtain old medical records.  Old Records Summarized: records from previous admission(s).     APC / Resident Notes:   62 year old male presenting for return to work clearance. He is asymptomatic today and his exam is benign. He is stable to return to work. Medicaid coordinator consulted to f/u with patient to assist in establishing primary care.                   Clinical Impression:   Final diagnoses:  [Z02.89] Encounter to obtain excuse from work (Primary)        ED Disposition Condition    Discharge Stable          ED Prescriptions    None       Follow-up Information       Follow up With Specialties Details Why Contact Info    [facility]  Schedule an appointment as soon as possible for a visit   [address] [facility] [id number]             Columba Mcclnedon PA-C  11/14/22 1256

## 2023-03-06 ENCOUNTER — HOSPITAL ENCOUNTER (EMERGENCY)
Facility: HOSPITAL | Age: 63
Discharge: HOME | End: 2023-03-06
Attending: EMERGENCY MEDICINE
Payer: MEDICAID

## 2023-03-06 VITALS
TEMPERATURE: 98 F | WEIGHT: 240 LBS | SYSTOLIC BLOOD PRESSURE: 148 MMHG | RESPIRATION RATE: 16 BRPM | BODY MASS INDEX: 30.8 KG/M2 | HEIGHT: 74 IN | DIASTOLIC BLOOD PRESSURE: 77 MMHG | HEART RATE: 87 BPM | OXYGEN SATURATION: 99 %

## 2023-03-06 DIAGNOSIS — Z87.448: ICD-10-CM

## 2023-03-06 DIAGNOSIS — R51.9 ACUTE NONINTRACTABLE HEADACHE, UNSPECIFIED HEADACHE TYPE: Primary | ICD-10-CM

## 2023-03-06 DIAGNOSIS — Z76.0 MEDICATION REFILL: ICD-10-CM

## 2023-03-06 DIAGNOSIS — Z86.79 HISTORY OF HYPERTENSION: ICD-10-CM

## 2023-03-06 PROCEDURE — 99283 EMERGENCY DEPT VISIT LOW MDM: CPT

## 2023-03-06 PROCEDURE — 99284 PR EMERGENCY DEPT VISIT,LEVEL IV: ICD-10-PCS | Mod: ,,, | Performed by: EMERGENCY MEDICINE

## 2023-03-06 PROCEDURE — 99284 EMERGENCY DEPT VISIT MOD MDM: CPT | Mod: ,,, | Performed by: EMERGENCY MEDICINE

## 2023-03-06 PROCEDURE — 25000003 PHARM REV CODE 250: Performed by: EMERGENCY MEDICINE

## 2023-03-06 RX ORDER — BUTALBITAL, ACETAMINOPHEN AND CAFFEINE 50; 325; 40 MG/1; MG/1; MG/1
1 TABLET ORAL
Status: COMPLETED | OUTPATIENT
Start: 2023-03-06 | End: 2023-03-06

## 2023-03-06 RX ORDER — HYDROCHLOROTHIAZIDE 12.5 MG/1
12.5 TABLET ORAL DAILY
Qty: 30 TABLET | Refills: 3 | Status: SHIPPED | OUTPATIENT
Start: 2023-03-06 | End: 2023-05-05 | Stop reason: SDUPTHER

## 2023-03-06 RX ORDER — ACETAMINOPHEN 500 MG
500 TABLET ORAL
Status: COMPLETED | OUTPATIENT
Start: 2023-03-06 | End: 2023-03-06

## 2023-03-06 RX ORDER — AMLODIPINE BESYLATE 5 MG/1
5 TABLET ORAL DAILY
Qty: 30 TABLET | Refills: 3 | Status: SHIPPED | OUTPATIENT
Start: 2023-03-06 | End: 2023-05-05 | Stop reason: SDUPTHER

## 2023-03-06 RX ORDER — NAPROXEN 500 MG/1
500 TABLET ORAL
Status: COMPLETED | OUTPATIENT
Start: 2023-03-06 | End: 2023-03-06

## 2023-03-06 RX ORDER — TAMSULOSIN HYDROCHLORIDE 0.4 MG/1
0.4 CAPSULE ORAL DAILY
Qty: 30 CAPSULE | Refills: 1 | Status: SHIPPED | OUTPATIENT
Start: 2023-03-06 | End: 2023-04-05

## 2023-03-06 RX ADMIN — NAPROXEN 500 MG: 500 TABLET ORAL at 02:03

## 2023-03-06 RX ADMIN — BUTALBITAL, ACETAMINOPHEN, AND CAFFEINE 1 TABLET: 50; 325; 40 TABLET ORAL at 02:03

## 2023-03-06 RX ADMIN — ACETAMINOPHEN 500 MG: 500 TABLET ORAL at 02:03

## 2023-03-06 NOTE — ED PROVIDER NOTES
Encounter Date: 3/6/2023    SCRIBE #1 NOTE: I, Dayanara Santos, am scribing for, and in the presence of,  Kieran James MD. I have scribed the following portions of the note - Other sections scribed: HPI, ROS, PE.   STAFF ATTENDING PHYSICIAN NOTE:  I provided and agree with the documentation provided by GAVIN on Polo Small.  ____________________  Eben AAMIR James MD, [facility]  Emergency Medicine Staff  2:53 PM 3/6/2023    History     Chief Complaint   Patient presents with    Headache     My bp was up this morning     Time patient was seen by the provider: 2:35 PM    The patient is a 62 y.o. male with past medical history of HTN, Hep C, COPD who presents to the ED with a complaint of frontal vs bitemporal non-rad, gradual onset headaches upon waking up this AM with no associated preceding aura. He reports a history of headaches in the past, but none upon waking up. He is not taking amlodipine and hydrochlorothiazide as prescribed. No assoc numbness, weakness, to any extremity, gait instability or N/V. He reports HA was gradual in onset and similar to multiple HAs he's had in the past. No visual changes, dizziness, N/V and she reports no neck pain/stiffness, no fever or chills and no recent travel. No reported alleviating or aggravating factors. No reported Hx of DVT/PE or other complaints. He also notes increased urgency with decreased volume output. Pt has yet to establish care with urology. Denies neck pain, photophobia, phonophobia, changes in vision, rhinorrhea, and congestion. No exacerbating or alleviating factors at this time. Patient has not taken OTC medication his headache.     The history is provided by the patient and medical records. No  was used.   Review of patient's allergies indicates:  No Known Allergies  Past Medical History:   Diagnosis Date    Arthritis     Asthma     BPH (benign prostatic hyperplasia)     COPD (chronic obstructive pulmonary disease)     Depression      "Hepatitis C     Hypertension     Varicose veins of both lower extremities      History reviewed. No pertinent surgical history.  Family History   Problem Relation Age of Onset    Cancer Mother     Heart disease Father      Social History     Tobacco Use    Smoking status: Every Day     Packs/day: 1.00     Years: 25.00     Pack years: 25.00     Types: Cigarettes    Smokeless tobacco: Never   Substance Use Topics    Alcohol use: Yes     Comment: 3-4 beers every other day    Drug use: Not Currently     Types: "Crack" cocaine     Review of Systems   Constitutional:  Negative for fever.   HENT:  Negative for sore throat.    Eyes:  Negative for photophobia, pain and visual disturbance.   Respiratory:  Negative for cough and shortness of breath.    Cardiovascular:  Negative for chest pain.   Gastrointestinal:  Negative for abdominal pain, diarrhea, nausea and vomiting.   Genitourinary:  Positive for decreased urine volume and urgency. Negative for dysuria.   Musculoskeletal:  Negative for neck pain.   Skin:  Negative for rash and wound.   Allergic/Immunologic: Negative for immunocompromised state.   Neurological:  Positive for headaches. Negative for syncope.   Psychiatric/Behavioral:  Negative for confusion.      Physical Exam     Initial Vitals [03/06/23 1417]   BP Pulse Resp Temp SpO2   (!) 146/81 89 18 98.2 °F (36.8 °C) 96 %      MAP       --         Physical Exam    Nursing note and vitals reviewed.    GENERAL: Well-appearing and Non-Toxic; Well-Nourished; NAD.  HEENT: AT/NC; PERRL, EOMI, Acuity & Fields WNL; TA region with no TTP, no nodularity or pulse asymmetry to TA; speaking full sentences with no drooling.  NECK: Supple, FROM with no meningismus, no accessory muscle use. No carotid bruits B/L.  HEART: Regular rate and rhythm, no M/G/T.   LUNGS: No Tachypnea, No Retractions, and CTA B/L with no W/R/R.  ABDOMEN: Soft, ND, NTTP. No rigidity. No guarding. NEG Redding's, Rovsing's, Psoas', Obturator's and McBurney's " Signs.  BACK: Atraumatic, No midline TTP to C/T/LS spine; No CVA tenderness B/L. SLRT NEG.  EXTREMITIES: FROM. Strength 5/5. Symmetrical Sensorium and with no deficits. Soft Comparments.  SKIN: Warm, Dry, No Skin Tears or Rashes.  VASCULAR: 2+ pulses Prox/Dist &amp; Symm with no delay.  NEUROLOGIC: AAOx3, No Receptive or Expressive Aphasia, Answering Questions Appropriately, Recent & Remote Memory Intact, No Visual or Tactile Agnosia to B/L UE; CN/PN Intact, Strength 5/5, Sens Symmetrical to UE & LE, No Ataxia      ED Course   Procedures  Labs Reviewed   HIV 1 / 2 ANTIBODY          Imaging Results    None          Medications   naproxen tablet 500 mg (500 mg Oral Given 3/6/23 1451)   butalbital-acetaminophen-caffeine -40 mg per tablet 1 tablet (1 tablet Oral Given 3/6/23 1451)   acetaminophen tablet 500 mg (500 mg Oral Given 3/6/23 1451)     Medical Decision Making:   History:   Old Medical Records: I decided to obtain old medical records.  Initial Assessment:   Well appearing, afebrile, atraumatic and neurovascularly intact male with history of headaches reports gradual onset headache with no appreciated red flags that would indicate subarachnoid hemorrhage, temporal arteritis, cerebral venous thrombosis or idiopathic intracranial hypertension.  No appreciated signs of acute angle closure glaucoma as etiology to his symptoms.  We discussed IV versus p.o. treatment, which he requested oral analgesics here and as a prescription upon further discussion, it appears the patient was having a few too many beers prior to going to sleep without properly drinking water/non alcoholic hydration prior to going to sleep.  No evidence of ETOH intoxication withdrawal this time that may warrant further ED management.  We additionally spoke quite a bit about his medication noncompliance need to take his antihypertensives, which I refilled.  Additionally refilled his Flomax and placed a referral to Urology as he has been  reporting some urinary dribbling, possible premature urinary incontinence and hesitancy.  Abdomen is currently benign no urinary complaints that may warrant urologic evaluation this time today.  ____________________  Eben James MD, [facility]  Emergency Medicine Staff  2:59 PM 3/6/2023    Clinical Tests:   Lab Tests: Ordered and Reviewed        Scribe Attestation:   Scribe #1: I performed the above scribed service and the documentation accurately describes the services I performed. I attest to the accuracy of the note.                   Clinical Impression:   Final diagnoses:  [R51.9] Acute nonintractable headache, unspecified headache type (Primary)  [Z86.79] History of hypertension  [Z76.0] Medication refill  [Z87.448] History of dribbling of urine        ED Disposition Condition    Discharge Stable          ED Prescriptions       Medication Sig Dispense Start Date End Date Auth. Provider    amLODIPine (NORVASC) 5 MG tablet Take 1 tablet (5 mg total) by mouth once daily. 30 tablet 3/6/2023 4/5/2023 Kieran James MD    hydroCHLOROthiazide (HYDRODIURIL) 12.5 MG Tab Take 1 tablet (12.5 mg total) by mouth once daily. 30 tablet 3/6/2023 4/5/2023 Kieran James MD    tamsulosin (FLOMAX) 0.4 mg Cap Take 1 capsule (0.4 mg total) by mouth once daily. 30 capsule 3/6/2023 4/5/2023 Kieran James MD          Follow-up Information       Follow up With Specialties Details Why Contact Info    [facility] - Emergency Dept Emergency Medicine  If headache worsens/returns, visual changess/loss, numbness, weakness or other complaints [address]  [facility] [id number] [phone]             Kieran James MD  03/06/23 1500

## 2023-03-06 NOTE — Clinical Note
"Polo"Beth Small was seen and treated in our emergency department on 3/6/2023.  He may return to work on 03/07/2023.       If you have any questions or concerns, please don't hesitate to call.      Viry Brown RN    "

## 2023-03-08 ENCOUNTER — HOSPITAL ENCOUNTER (EMERGENCY)
Facility: HOSPITAL | Age: 63
Discharge: HOME | End: 2023-03-08
Attending: EMERGENCY MEDICINE
Payer: MEDICAID

## 2023-03-08 VITALS
SYSTOLIC BLOOD PRESSURE: 185 MMHG | HEIGHT: 74 IN | HEART RATE: 85 BPM | OXYGEN SATURATION: 98 % | RESPIRATION RATE: 18 BRPM | WEIGHT: 240 LBS | DIASTOLIC BLOOD PRESSURE: 89 MMHG | TEMPERATURE: 99 F | BODY MASS INDEX: 30.8 KG/M2

## 2023-03-08 DIAGNOSIS — M54.9 CHRONIC UPPER BACK PAIN: ICD-10-CM

## 2023-03-08 DIAGNOSIS — G89.29 CHRONIC UPPER BACK PAIN: ICD-10-CM

## 2023-03-08 DIAGNOSIS — G89.29 CHRONIC BILATERAL THORACIC BACK PAIN: ICD-10-CM

## 2023-03-08 DIAGNOSIS — M25.512 CHRONIC PAIN OF BOTH SHOULDERS: Primary | ICD-10-CM

## 2023-03-08 DIAGNOSIS — M25.511 CHRONIC PAIN OF BOTH SHOULDERS: Primary | ICD-10-CM

## 2023-03-08 DIAGNOSIS — G89.29 CHRONIC PAIN OF BOTH SHOULDERS: Primary | ICD-10-CM

## 2023-03-08 DIAGNOSIS — M54.6 CHRONIC BILATERAL THORACIC BACK PAIN: ICD-10-CM

## 2023-03-08 PROCEDURE — 99284 PR EMERGENCY DEPT VISIT,LEVEL IV: ICD-10-PCS | Mod: ,,, | Performed by: PHYSICIAN ASSISTANT

## 2023-03-08 PROCEDURE — 99284 EMERGENCY DEPT VISIT MOD MDM: CPT

## 2023-03-08 PROCEDURE — 63600175 PHARM REV CODE 636 W HCPCS: Performed by: PHYSICIAN ASSISTANT

## 2023-03-08 PROCEDURE — 96372 THER/PROPH/DIAG INJ SC/IM: CPT | Performed by: PHYSICIAN ASSISTANT

## 2023-03-08 PROCEDURE — 99284 EMERGENCY DEPT VISIT MOD MDM: CPT | Mod: ,,, | Performed by: PHYSICIAN ASSISTANT

## 2023-03-08 RX ORDER — KETOROLAC TROMETHAMINE 30 MG/ML
10 INJECTION, SOLUTION INTRAMUSCULAR; INTRAVENOUS
Status: COMPLETED | OUTPATIENT
Start: 2023-03-08 | End: 2023-03-08

## 2023-03-08 RX ORDER — NAPROXEN 500 MG/1
500 TABLET ORAL EVERY 12 HOURS PRN
Qty: 20 TABLET | Refills: 0 | OUTPATIENT
Start: 2023-03-08 | End: 2023-05-05

## 2023-03-08 RX ADMIN — KETOROLAC TROMETHAMINE 10 MG: 30 INJECTION, SOLUTION INTRAMUSCULAR; INTRAVENOUS at 05:03

## 2023-03-08 NOTE — ED PROVIDER NOTES
"Encounter Date: 3/8/2023       History     Chief Complaint   Patient presents with    Shoulder Pain     States been going on for awhile     4:58 PM  Patient is a 62-year-old male with a history of HTN, COPD, asthma, arthritis, depression who presents to [facility] ED for emergent evaluation of polyarthralgias.    Patient reports chronic pain.  On chart review he has been seen in the ED since August 2022 intermittently for similar pains.  He states this pain started for 1 month almost on a daily basis.  He endorses pain to his bilateral shoulders, upper back, and thoracic back, bilaterally.  His pain is worse with working which involves lifting, throwing as felt, and pushing his tools to level the ground.  He states his pain improves with ejection from the emergency department.  He denies taking any OTC medication at all.  He has not had fever, chills, cough, vomiting, diarrhea, chest pain, abdominal pain, bowel or bladder incontinence, saddle anesthesias, lower extremity paresthesias.  Denies any falls or trauma.  Current pain is 10/10.  He walked to the emergency department.      Review of patient's allergies indicates:  No Known Allergies  Past Medical History:   Diagnosis Date    Arthritis     Asthma     BPH (benign prostatic hyperplasia)     COPD (chronic obstructive pulmonary disease)     Depression     Hepatitis C     Hypertension     Varicose veins of both lower extremities      History reviewed. No pertinent surgical history.  Family History   Problem Relation Age of Onset    Cancer Mother     Heart disease Father      Social History     Tobacco Use    Smoking status: Every Day     Packs/day: 1.00     Years: 25.00     Pack years: 25.00     Types: Cigarettes    Smokeless tobacco: Never   Substance Use Topics    Alcohol use: Yes     Comment: 3-4 beers every other day    Drug use: Not Currently     Types: "Crack" cocaine     Review of Systems   Cardiovascular:  Negative for chest pain.   Gastrointestinal:  Negative " for constipation and diarrhea.   Genitourinary:  Negative for difficulty urinating.   Musculoskeletal:  Positive for arthralgias, back pain and myalgias. Negative for gait problem.   Neurological:  Negative for numbness.     Physical Exam     Initial Vitals [03/08/23 1628]   BP Pulse Resp Temp SpO2   (!) 185/89 85 18 98.7 °F (37.1 °C) 98 %      MAP       --         Physical Exam    Vitals reviewed.  Constitutional: He appears well-developed and well-nourished. He is not diaphoretic. He is cooperative.  Non-toxic appearance. He does not have a sickly appearance. He does not appear ill. No distress.   HENT:   Head: Normocephalic and atraumatic.   Nose: Nose normal.   Mouth/Throat: No trismus in the jaw.   Eyes: Conjunctivae and EOM are normal.   Neck:   Normal range of motion.  Cardiovascular:  Normal rate.           Pulses:       Radial pulses are 2+ on the right side and 2+ on the left side.   Pulmonary/Chest: No accessory muscle usage. No tachypnea. No respiratory distress.   Abdominal: He exhibits no distension.   Musculoskeletal:         General: Normal range of motion.      Right shoulder: No bony tenderness. Normal range of motion.      Left shoulder: No bony tenderness. Normal range of motion.      Cervical back: Normal range of motion. Tenderness present. No bony tenderness. Normal range of motion.      Thoracic back: Tenderness present. No bony tenderness. Normal range of motion.      Lumbar back: No bony tenderness. Normal range of motion.        Back:       Comments: No underlying bony tenderness.  Intact range of motion of bilateral upper extremities with 5/5 strength.  No difficulty bearing weight or ambulating.  Skin without erythema, wounds, rashes, lesions, edema.     Neurological: He is alert. He has normal strength.   Skin: Skin is dry. No pallor.       ED Course   Procedures  Labs Reviewed - No data to display         Imaging Results    None          Medications   ketorolac injection 9.999 mg  (9.999 mg Intramuscular Given 3/8/23 0433)     Medical Decision Making:   History:   Old Medical Records: I decided to obtain old medical records.  Old Records Summarized: records from previous admission(s) and records from another hospital.  Initial Assessment:   Patient is a 62-year-old male with a history of HTN, COPD, asthma, arthritis, depression who presents to [facility] ED for emergent evaluation of polyarthralgias.  Differential Diagnosis:   Includes but is not limited to strain, DDD, DJD.  No injury or trauma.  No bony tenderness.  Intact range of motion and strength throughout.  No red flag symptoms.  Doubt fractures, dislocations, cauda equina.  Clinical Tests:   Radiological Study: Reviewed  ED Management:  Lumbar and shoulder x-rays in the past unremarkable after presenting to the ED for similar symptoms.  No history of recent trauma.  Doubt fractures or dislocations.  I do not feel that he needs any emergent labs or imaging at this time as it is of little utility.  He reports having similar symptoms in the past that typically is relieved with Toradol injection.  Will give Toradol injection now.  Discussed use of naproxen.  Start tomorrow in 12 hours.  Avoid other NSAIDs.  We discussed continuing proper lifting techniques.  Follow-up with PCP.  All of his questions were answered.  Patient comfortable with plan and stable for discharge.           ED Course as of 03/08/23 1956   Wed Mar 08, 2023   1635 BP(!): 185/89 [CL]   1636 Temp: 98.7 °F (37.1 °C) [CL]   1636 Pulse: 85 [CL]   1636 Resp: 18 [CL]   1636 SpO2: 98 % [CL]      ED Course User Index  [CL] Tereza Meyer PA-C                 Clinical Impression:   Final diagnoses:  [M25.511, G89.29, M25.512] Chronic pain of both shoulders (Primary)  [M54.9, G89.29] Chronic upper back pain  [M54.6, G89.29] Chronic bilateral thoracic back pain        ED Disposition Condition    Discharge Stable          ED Prescriptions       Medication Sig Dispense Start Date End  Date Auth. Provider    naproxen (NAPROSYN) 500 MG tablet Take 1 tablet (500 mg total) by mouth every 12 (twelve) hours as needed. 20 tablet 3/8/2023 -- Tereza Meyer PA-C          Follow-up Information       Follow up With Specialties Details Why Contact [facility]  Schedule an appointment as soon as possible for a visit   [address] [place] [address]    [phone]    Levar Ambrose MD  Go on 4/24/2023  Orthopedic Surgery  [address] [facility] [id number]    [facility] - Emergency Dept Emergency Medicine  If symptoms worsen [address] [facility] [id number] [phone]               Tereza Meyer PA-C  03/08/23 1956

## 2023-03-08 NOTE — Clinical Note
"Polo"eBth Small was seen and treated in our emergency department on 3/8/2023.  He may return to work on 03/09/2023.       If you have any questions or concerns, please don't hesitate to call.      Tereza Meyer PA-C"

## 2023-03-08 NOTE — DISCHARGE INSTRUCTIONS
Take naproxen 500 mg every 12 hours as needed with food for anti-inflammatory relief.  Your next dose is at 05:00.  Do not take other nonsteroidal anti-inflammatory such as ibuprofen (Advil, Motrin).  You can take acetaminophen/tylenol 650 mg every 6 hours or 1000 mg every 8 hours for added relief.  Apply ice to the area for 10-20 minutes every 4 hours. You can apply heat 2 days after for the same duration and frequency.  Continue to practice proper lifting techniques.  Follow up with PCP and Orthopedics as scheduled.  Return to the ER for new or worsening symptoms.

## 2023-03-08 NOTE — ED NOTES
Discharge home, states understanding to follow up as directed. Ambulates out of ED without difficulty. RX given,  Tolerates shot time without difficulty

## 2023-03-08 NOTE — ED TRIAGE NOTES
Patient identifiers for Polo Small 62 y.o. male checked and correct.  Chief Complaint   Patient presents with    Shoulder Pain     States been going on for awhile     Past Medical History:   Diagnosis Date    Arthritis     Asthma     BPH (benign prostatic hyperplasia)     COPD (chronic obstructive pulmonary disease)     Depression     Hepatitis C     Hypertension     Varicose veins of both lower extremities      Allergies reported: Review of patient's allergies indicates:  No Known Allergies      LOC: Patient is awake, alert, and aware of environment with an appropriate affect. Patient is oriented x 4 and speaking appropriately.  APPEARANCE: Patient resting comfortably and in no acute distress. Patient is clean and well groomed, patient's clothing is properly fastened.  SKIN: The skin is warm and dry. Patient has normal skin turgor and moist mucus membranes.   MUSKULOSKELETAL: Patient is moving all extremities well, no obvious deformities noted. Pulses intact. Bilateral shoulder pain  RESPIRATORY: Airway is open and patent. Respirations are spontaneous and non-labored with normal effort and rate.  CARDIAC: Patient has a normal rate and rhythm  on cardiac monitor. No peripheral edema noted.   ABDOMEN: No distention noted. Soft and non-tender upon palpation.  NEUROLOGICAL: PERRL. Facial expression is symmetrical. Hand grasps are equal bilaterally. Normal sensation in all extremities when touched with finger.

## 2023-03-20 ENCOUNTER — HOSPITAL ENCOUNTER (EMERGENCY)
Facility: HOSPITAL | Age: 63
Discharge: HOME | End: 2023-03-20
Attending: EMERGENCY MEDICINE
Payer: COMMERCIAL

## 2023-03-20 VITALS
HEART RATE: 103 BPM | OXYGEN SATURATION: 96 % | SYSTOLIC BLOOD PRESSURE: 146 MMHG | WEIGHT: 240 LBS | TEMPERATURE: 98 F | DIASTOLIC BLOOD PRESSURE: 71 MMHG | RESPIRATION RATE: 16 BRPM | BODY MASS INDEX: 31.23 KG/M2
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
## 2023-03-20 PROCEDURE — 99283 EMERGENCY DEPT VISIT LOW MDM: CPT

## 2023-03-20 PROCEDURE — 99283 PR EMERGENCY DEPT VISIT,LEVEL III: ICD-10-PCS | Mod: ,,,

## 2023-03-20 PROCEDURE — 99283 EMERGENCY DEPT VISIT LOW MDM: CPT | Mod: ,,,

## 2023-03-20 RX ORDER — METHOCARBAMOL 500 MG/1
500 TABLET, FILM COATED ORAL 2 TIMES DAILY PRN
Qty: 20 TABLET | Refills: 0 | Status: SHIPPED | OUTPATIENT
Start: 2023-03-20 | End: 2023-03-30

## 2023-03-20 NOTE — Clinical Note
"Polo"Beth Small was seen and treated in our emergency department on 3/20/2023.  He may return to work on 03/21/2023.       If you have any questions or concerns, please don't hesitate to call.      Pascale Whittington PA-C"

## 2023-03-20 NOTE — DISCHARGE INSTRUCTIONS
I prescribed a short course of muscle relaxants however do no take while driving, or working as this can cause you to become drowsy.    Take acetaminophen (Tylenol), ibuprofen (Motrin, Advil) or naproxen (Naprosyn, Aleve) over-the-counter for pain as needed.  Please strictly follow all instructions on the packaging and do not take more medication per dose and per day than the instructions recommend.    Moderate activity with rest should also improve your symptoms
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
## 2023-04-03 ENCOUNTER — HOSPITAL ENCOUNTER (EMERGENCY)
Facility: HOSPITAL | Age: 63
Discharge: HOME | End: 2023-04-03
Attending: EMERGENCY MEDICINE
Payer: MEDICAID

## 2023-04-03 VITALS
OXYGEN SATURATION: 97 % | DIASTOLIC BLOOD PRESSURE: 72 MMHG | BODY MASS INDEX: 32.51 KG/M2 | TEMPERATURE: 98 F | WEIGHT: 240 LBS | HEART RATE: 100 BPM | HEIGHT: 72 IN | RESPIRATION RATE: 18 BRPM | SYSTOLIC BLOOD PRESSURE: 141 MMHG

## 2023-04-03 DIAGNOSIS — M54.9 UPPER BACK PAIN: ICD-10-CM

## 2023-04-03 DIAGNOSIS — Z02.89 ENCOUNTER TO OBTAIN EXCUSE FROM WORK: Primary | ICD-10-CM

## 2023-04-03 PROCEDURE — 99284 PR EMERGENCY DEPT VISIT,LEVEL IV: ICD-10-PCS | Mod: ,,, | Performed by: EMERGENCY MEDICINE

## 2023-04-03 PROCEDURE — 99283 EMERGENCY DEPT VISIT LOW MDM: CPT

## 2023-04-03 PROCEDURE — 99284 EMERGENCY DEPT VISIT MOD MDM: CPT | Mod: ,,, | Performed by: EMERGENCY MEDICINE

## 2023-04-03 RX ORDER — METHOCARBAMOL 500 MG/1
1000 TABLET, FILM COATED ORAL NIGHTLY
Qty: 10 TABLET | Refills: 0 | Status: SHIPPED | OUTPATIENT
Start: 2023-04-03 | End: 2023-04-08

## 2023-04-03 NOTE — FIRST PROVIDER EVALUATION
"Medical screening examination initiated.  I have conducted a focused provider triage encounter, findings are as follows:    Brief history of present illness:  left flank pain     Vitals:    04/03/23 1726   BP: (!) 141/72   Pulse: 100   Resp: 18   Temp: 97.8 °F (36.6 °C)   TempSrc: Oral   SpO2: 97%   Weight: 108.9 kg (240 lb)   Height: 5' 11.5" (1.816 m)       Pertinent physical exam: ambulatory unassisted, NAD    Brief workup plan:  UA    Preliminary workup initiated; this workup will be continued and followed by the physician or advanced practice provider that is assigned to the patient when roomed.  "

## 2023-04-03 NOTE — Clinical Note
"Polo"Beth Small was seen and treated in our emergency department on 4/3/2023.  He may return to work on 04/04/2023.       If you have any questions or concerns, please don't hesitate to call.      Terrance Hernandez PA-C"

## 2023-04-04 NOTE — ED PROVIDER NOTES
"Encounter Date: 4/3/2023       History     Chief Complaint   Patient presents with    Flank Pain     L flank pain, denies kidney stones in past     The history is provided by the patient and medical records. No  was used.     Polo Small is a 62 y.o. male with medical history of HTN, COPD, Depression presenting to the ED with the chief complaint of left flank pain.     Reports upper back pain for the last month. 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 He additionally reports urinary frequency. Denies headache, chest pain, SOB, abdominal pain, vomiting, urinary or bowel movement changes.    Patient is not interested in work-up for his complaints today and requesting a note for work and a prescription for pain medications.     Review of patient's allergies indicates:  No Known Allergies  Past Medical History:   Diagnosis Date    Arthritis     Asthma     BPH (benign prostatic hyperplasia)     COPD (chronic obstructive pulmonary disease)     Depression     Hepatitis C     Hypertension     Varicose veins of both lower extremities      History reviewed. No pertinent surgical history.  Family History   Problem Relation Age of Onset    Cancer Mother     Heart disease Father      Social History     Tobacco Use    Smoking status: Every Day     Packs/day: 1.00     Years: 25.00     Pack years: 25.00     Types: Cigarettes    Smokeless tobacco: Never   Substance Use Topics    Alcohol use: Yes     Comment: 3-4 beers every other day    Drug use: Not Currently     Types: "Crack" cocaine     Review of Systems   Musculoskeletal:  Positive for back pain.     Physical Exam     Initial Vitals [04/03/23 1726]   BP Pulse Resp Temp SpO2   (!) 141/72 100 18 97.8 °F (36.6 °C) 97 %      MAP       --         Physical Exam    Constitutional: He appears well-developed and well-nourished. He is not diaphoretic. He is easily aroused.   HENT:   Head: Normocephalic and atraumatic.   Mouth/Throat: " Oropharynx is clear and moist. No oropharyngeal exudate.   Eyes: EOM and lids are normal. Pupils are equal, round, and reactive to light. No scleral icterus.   Neck: Phonation normal. Neck supple. No stridor present.   Normal range of motion.  Cardiovascular:  Normal rate and regular rhythm.           Pulmonary/Chest: Breath sounds normal. No stridor. No respiratory distress. He has no wheezes. He has no rales.   Abdominal: Abdomen is soft. He exhibits no distension. There is no abdominal tenderness.   No CVA tenderness There is no rebound.   Musculoskeletal:         General: No tenderness or edema. Normal range of motion.      Cervical back: Normal range of motion and neck supple.     Neurological: He is alert, oriented to person, place, and time and easily aroused. He has normal strength. No sensory deficit.   Skin: Skin is warm and dry. No rash noted. No erythema.   Psychiatric: He has a normal mood and affect. His speech is normal.       ED Course   Procedures  Labs Reviewed - No data to display         Imaging Results    None          Medications - No data to display  Medical Decision Making:   History:   Old Medical Records: I decided to obtain old medical records.  Old Records Summarized: records from clinic visits and records from previous admission(s).     APC / Resident Notes:   62 y.o. male with medical history of HTN, COPD, Depression presenting to the ED c/o upper back pain for the past month. Discussed my concerns for cardiac etiology versus pneumonia versus musculoskeletal etiology for his pain and offered imaging and laboratory work. Patient states he does not want to stay in the ED for work-up at this time and onkly interested in obtaining a work excuse and pain medications. Patient displayed appropriate medical decision making capacity. Vitals unremarkable. Afebrile and non-toxic appearing. Patient acknowledged risks of leaving without a work-up. Work excuse provided. RX for Robaxin provided for  pain. Return to ED precautions given for new, worsening, or concerning symptoms.                    Clinical Impression:   Final diagnoses:  [Z02.89] Encounter to obtain excuse from work (Primary)  [M54.9] Upper back pain        ED Disposition Condition    Discharge Stable          ED Prescriptions       Medication Sig Dispense Start Date End Date Auth. Provider    methocarbamoL (ROBAXIN) 500 MG Tab Take 2 tablets (1,000 mg total) by mouth every evening. for 5 days 10 tablet 4/3/2023 4/8/2023 Terrance Hernandez PA-C          Follow-up Information       Follow up With Specialties Details Why Contact Info Additional Information    [facility]   [address]  [facility] [address] [phone] [facility] Please park in surface lot and check in at central registration desk    [facility]  Call   [address] [facility] [address]     [facility] Surgical Oncology, Orthopedic Surgery, Genetics, Physical Medicine and Rehabilitation, Occupational Therapy, Radiology   [address] [facility] [address]  [phone]                Terrance Hernandez PA-C  04/03/23 9934

## 2023-04-04 NOTE — DISCHARGE INSTRUCTIONS
Follow-up with your primary care provider for further evaluation.    Return to the emergency room for new, worsening, or concerning symptoms.

## 2023-04-04 NOTE — ED TRIAGE NOTES
Polo Small, an 62 y.o. male presents to the ED with c/o upper back ain x 2 weeks. 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 Denies any recent falls or trauma.      Chief Complaint   Patient presents with    Flank Pain     L flank pain, denies kidney stones in past     Review of patient's allergies indicates:  No Known Allergies  Past Medical History:   Diagnosis Date    Arthritis     Asthma     BPH (benign prostatic hyperplasia)     COPD (chronic obstructive pulmonary disease)     Depression     Hepatitis C     Hypertension     Varicose veins of both lower extremities       Adult Physical Assessment  LOC: Polo Small, 62 y.o. male verified via two identifiers.  The patient is awake, alert, oriented and speaking appropriately at this time.  APPEARANCE: Patient resting comfortably and appears to be in no acute distress at this time. Patient is clean and well groomed, patient's clothing is properly fastened.  SKIN:The skin is warm and dry, color consistent with ethnicity, patient has normal skin turgor and moist mucus membranes, skin intact, no breakdown or brusing noted.  MUSCULOSKELETAL: Patient moving all extremities well, no obvious swelling or deformities noted. Patient reports dull pain to upper back.  RESPIRATORY: Airway is open and patent, respirations are spontaneous, patient has a normal effort and rate, no accessory muscle use noted.  CARDIAC: Patient has a normal rate and rhythm, no periphreal edema noted in any extremity, capillary refill < 3 seconds in all extremities  ABDOMEN: Soft and non tender to palpation, no abdominal distention noted. Bowel sounds present in all four quadrants.  NEUROLOGIC: Eyes open spontaneously, behavior appropriate to situation, follows commands, facial expression symmetrical, bilateral hand grasp equal and even, purposeful motor response noted, normal sensation in all extremities when touched with a finger.

## 2023-05-05 ENCOUNTER — HOSPITAL ENCOUNTER (EMERGENCY)
Facility: HOSPITAL | Age: 63
Discharge: HOME | End: 2023-05-05
Attending: EMERGENCY MEDICINE
Payer: MEDICAID

## 2023-05-05 VITALS
DIASTOLIC BLOOD PRESSURE: 80 MMHG | SYSTOLIC BLOOD PRESSURE: 161 MMHG | RESPIRATION RATE: 18 BRPM | OXYGEN SATURATION: 96 % | WEIGHT: 240 LBS | TEMPERATURE: 99 F | BODY MASS INDEX: 33.01 KG/M2 | HEART RATE: 89 BPM

## 2023-05-05 DIAGNOSIS — S29.012A MUSCLE STRAIN OF LEFT UPPER BACK, INITIAL ENCOUNTER: ICD-10-CM

## 2023-05-05 DIAGNOSIS — Z76.0 MEDICATION REFILL: ICD-10-CM

## 2023-05-05 DIAGNOSIS — S29.012A MUSCLE STRAIN OF RIGHT UPPER BACK, INITIAL ENCOUNTER: Primary | ICD-10-CM

## 2023-05-05 PROCEDURE — 99284 EMERGENCY DEPT VISIT MOD MDM: CPT | Mod: ,,, | Performed by: EMERGENCY MEDICINE

## 2023-05-05 PROCEDURE — 99284 PR EMERGENCY DEPT VISIT,LEVEL IV: ICD-10-PCS | Mod: ,,, | Performed by: EMERGENCY MEDICINE

## 2023-05-05 PROCEDURE — 25000003 PHARM REV CODE 250: Performed by: EMERGENCY MEDICINE

## 2023-05-05 PROCEDURE — 99283 EMERGENCY DEPT VISIT LOW MDM: CPT

## 2023-05-05 RX ORDER — BACLOFEN 20 MG/1
20 TABLET ORAL 3 TIMES DAILY
Qty: 9 TABLET | Refills: 0 | Status: SHIPPED | OUTPATIENT
Start: 2023-05-05 | End: 2023-05-09

## 2023-05-05 RX ORDER — NAPROXEN 375 MG/1
375 TABLET ORAL 2 TIMES DAILY WITH MEALS
Qty: 6 TABLET | Refills: 0 | Status: SHIPPED | OUTPATIENT
Start: 2023-05-05 | End: 2023-05-08

## 2023-05-05 RX ORDER — BACLOFEN 5 MG/1
5 TABLET ORAL
Status: COMPLETED | OUTPATIENT
Start: 2023-05-05 | End: 2023-05-05

## 2023-05-05 RX ORDER — HYDROCHLOROTHIAZIDE 12.5 MG/1
12.5 TABLET ORAL DAILY
Qty: 30 TABLET | Refills: 5 | Status: SHIPPED | OUTPATIENT
Start: 2023-05-05 | End: 2023-06-05

## 2023-05-05 RX ORDER — BACLOFEN 5 MG/1
5 TABLET ORAL 3 TIMES DAILY
Status: DISCONTINUED | OUTPATIENT
Start: 2023-05-06 | End: 2023-05-05

## 2023-05-05 RX ORDER — NAPROXEN 500 MG/1
500 TABLET ORAL
Status: COMPLETED | OUTPATIENT
Start: 2023-05-05 | End: 2023-05-05

## 2023-05-05 RX ORDER — LIDOCAINE 50 MG/G
1 PATCH TOPICAL
Status: DISCONTINUED | OUTPATIENT
Start: 2023-05-05 | End: 2023-05-06 | Stop reason: HOSPADM

## 2023-05-05 RX ORDER — AMLODIPINE BESYLATE 5 MG/1
5 TABLET ORAL DAILY
Qty: 30 TABLET | Refills: 5 | OUTPATIENT
Start: 2023-05-05 | End: 2023-12-05

## 2023-05-05 RX ORDER — LIDOCAINE 50 MG/G
1 PATCH TOPICAL DAILY
Qty: 15 PATCH | Refills: 3 | Status: SHIPPED | OUTPATIENT
Start: 2023-05-05 | End: 2023-05-10 | Stop reason: SDUPTHER

## 2023-05-05 RX ADMIN — NAPROXEN 500 MG: 500 TABLET ORAL at 10:05

## 2023-05-05 RX ADMIN — BACLOFEN 5 MG: 5 TABLET ORAL at 10:05

## 2023-05-05 RX ADMIN — LIDOCAINE 1 PATCH: 50 PATCH TOPICAL at 10:05

## 2023-05-05 NOTE — Clinical Note
"Polo"Beth Small was seen and treated in our emergency department on 5/5/2023.  He may return to work on 05/08/2023.       If you have any questions or concerns, please don't hesitate to call.      Kieran James MD"

## 2023-05-05 NOTE — Clinical Note
"oPlo"Beth Small was seen and treated in our emergency department on 5/5/2023.  He may return to work on 05/08/2023.       If you have any questions or concerns, please don't hesitate to call.      Kieran James MD"

## 2023-05-06 NOTE — ED TRIAGE NOTES
Polo Small, a 62 y.o. male presents to the ED w/ complaint of generalized back pain.     Triage note:  Chief Complaint   Patient presents with    Back Pain     Review of patient's allergies indicates:  No Known Allergies  Past Medical History:   Diagnosis Date    Arthritis     Asthma     BPH (benign prostatic hyperplasia)     COPD (chronic obstructive pulmonary disease)     Depression     Hepatitis C     Hypertension     Varicose veins of both lower extremities

## 2023-05-06 NOTE — ED PROVIDER NOTES
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 He denies any numbness or weakness to upper or lower extremities. No urinary  retention, urinary strength or urinary stream caliper changes. No reported perianal sensory deficits upon  ost-defecation wiping; no Hx of IVDU or perispinal surgeries; no Hx of trauma or bleeding disorders. He reports pain is aggravated with movement and alleviated with rest. No ROM deficits to B/L UE or LE and ambulating at baseline. Otherwise, no recent illness or other complaints.    Review of patient's allergies indicates:  No Known Allergies  Past Medical History:   Diagnosis Date    Arthritis     Asthma     BPH (benign prostatic hyperplasia)     COPD (chronic obstructive pulmonary disease)     Depression     Hepatitis C     Hypertension     Varicose veins of both lower extremities      No past surgical history on file.  Family History   Problem Relation Age of Onset    Cancer Mother     Heart disease Father      Social History     Tobacco Use    Smoking status: Every Day     Packs/day: 1.00     Years: 25.00     Pack years: 25.00     Types: Cigarettes    Smokeless tobacco: Never   Substance Use Topics    Alcohol use: Yes     Comment: 3-4 beers every other day    Drug use: Not Currently     Types: "Crack" cocaine     Review of Systems  HEENT: No HA, eye pain or changes in vision, sore throat, ear pain, otorrhea,  rhinorrhea, tooth pain, swelling, or voice changes.  NECK: No pain, masses, trauma, or redness.  HEART: No pain, palpitations, diaphoresis, nausea, or vomiting.  LUNG: No wheezes, cough, SOB, STYLES or other complaints.  ABDOMEN: No pain, nausea, vomiting, diarrhea, constipation, or flank pain.  BACK: + Upper back pain  : No discharge, dysuria, lesions, rashes, masses, sores or " retention.  EXTREMITIES: FROM and No swelling, redness, trauma/injuries, lesions, sores,  weakness, numbness, or tingling.  NEURO: No dizziness, weakness, fatigue, tremors, headache, change in vision or  disturbances of balance or coordination.  SKIN: No lesions, rashes, trauma/injuries or other abnormality.    Physical Exam     Initial Vitals [05/05/23 2034]   BP Pulse Resp Temp SpO2   (!) 167/80 95 14 98.7 °F (37.1 °C) 95 %      MAP       --         Physical Exam  GENERAL: Well-appearing and Non-Toxic; Well-Nourished; Speaking Full sentences and in mild distress 2/2 pain.  HEENT: AT/NC.  Anicteric.  NECK: Supple, FROM, no accessory muscle use.  HEART: Regular rate and rhythm, no M/G/T.  LUNGS: No Tachypnea, No Retractions, and CTA B/L with no W/R/R.  ABDOMEN: Soft, ND, NTTP.  BACK: Atraumatic, No midline TTP to C/T/LS spine with + TTP over RIGHT Trap & supraspinatus p muscle eliciting/reproducing described pain; No CVA tenderness B/L.  SKIN: No paresthesia to light tough, asymmetric warmth, rash to thorax/flank.  EXTREMITIES: FROM. No deformities, Soft compartments with 2+ pulses Prox/Dist Intact & Symm.  NEUROLOGIC: AAOx3, Answering Questions Appropriately, CN/PN Intact, Strength 5/5 to UE & LE Prox & Distally Upper Extre @ deltoid, shrugs, biceps & triceps    Procedures  Labs Reviewed - No data to display       Imaging Results    None          Medications   LIDOcaine 5 % patch 1 patch (1 patch Transdermal Patch Applied 5/5/23 2249)   naproxen tablet 500 mg (500 mg Oral Given 5/5/23 2249)   baclofen tablet 5 mg (5 mg Oral Given 5/5/23 2249)     Medical Decision Making:   History:   Old Medical Records: I decided to obtain old medical records.  Initial Assessment:   Afebrile, atraumatic and hemodynamically stable male presents with acute on chronic muscle strains and back pain, which is aggravated with movement and palpation.  He reports all symptoms elicited from heavy lifting.  No neurovascular deficits  appreciated.  No suspicion for epidural abscess, epidural hematoma, space-occupying lesion or other flags that may warrant emergent imaging at this time.  Analgesics and outpatient physical therapy/orthopedics I already had and I encouraged biting by assuring he follows up with musculoskeletal specialist.  Patient additionally reported running out of medication, which I gladly refilled medications and strongly encouraged to follow up with primary care physician, which he acknowledged.  ____________________  Eben James MD, [facility]  Emergency Medicine Staff  11:30 PM 5/5/2023                          Clinical Impression:   Final diagnoses:  [S29.012A] Muscle strain of right upper back, initial encounter (Primary)  [S29.012A] Muscle strain of left upper back, initial encounter  [Z76.0] Medication refill        ED Disposition Condition    Discharge Stable          ED Prescriptions       Medication Sig Dispense Start Date End Date Auth. Provider    amLODIPine (NORVASC) 5 MG tablet Take 1 tablet (5 mg total) by mouth once daily. 30 tablet 5/5/2023 6/4/2023 Kieran James MD    hydroCHLOROthiazide (HYDRODIURIL) 12.5 MG Tab Take 1 tablet (12.5 mg total) by mouth once daily. 30 tablet 5/5/2023 6/4/2023 Kieran James MD    baclofen (LIORESAL) 20 MG tablet Take 1 tablet (20 mg total) by mouth 3 (three) times daily. for 3 days 9 tablet 5/5/2023 5/8/2023 Kieran James MD    naproxen (NAPROSYN) 375 MG tablet Take 1 tablet (375 mg total) by mouth 2 (two) times daily with meals. for 3 days 6 tablet 5/5/2023 5/8/2023 Kieran James MD    LIDOcaine (LIDODERM) 5 % Place 1 patch onto the skin once daily. Remove & Discard patch within 12 hours or as directed by MD 15 patch 5/5/2023 -- Kieran James MD          Follow-up Information    None          Kieran James MD  05/05/23 7535

## 2023-05-10 ENCOUNTER — HOSPITAL ENCOUNTER (EMERGENCY)
Facility: HOSPITAL | Age: 63
Discharge: HOME | End: 2023-05-10
Attending: EMERGENCY MEDICINE
Payer: MEDICAID

## 2023-05-10 VITALS
BODY MASS INDEX: 31.81 KG/M2 | WEIGHT: 240 LBS | HEART RATE: 79 BPM | DIASTOLIC BLOOD PRESSURE: 78 MMHG | RESPIRATION RATE: 14 BRPM | HEIGHT: 73 IN | TEMPERATURE: 99 F | SYSTOLIC BLOOD PRESSURE: 137 MMHG | OXYGEN SATURATION: 97 %

## 2023-05-10 DIAGNOSIS — M54.6 ACUTE BILATERAL THORACIC BACK PAIN: Primary | ICD-10-CM

## 2023-05-10 PROCEDURE — 99283 PR EMERGENCY DEPT VISIT,LEVEL III: ICD-10-PCS | Mod: ,,, | Performed by: EMERGENCY MEDICINE

## 2023-05-10 PROCEDURE — 99282 EMERGENCY DEPT VISIT SF MDM: CPT

## 2023-05-10 PROCEDURE — 99283 EMERGENCY DEPT VISIT LOW MDM: CPT | Mod: ,,, | Performed by: EMERGENCY MEDICINE

## 2023-05-10 RX ORDER — LIDOCAINE 50 MG/G
1 PATCH TOPICAL
Status: DISCONTINUED | OUTPATIENT
Start: 2023-05-10 | End: 2023-05-10 | Stop reason: HOSPADM

## 2023-05-10 RX ORDER — LIDOCAINE 50 MG/G
1 PATCH TOPICAL DAILY
Qty: 15 PATCH | Refills: 3 | Status: SHIPPED | OUTPATIENT
Start: 2023-05-10

## 2023-05-10 NOTE — ED NOTES
Patient identifiers verified and correct for  Mr Small  C/C:  Back pain SEE NN  APPEARANCE: awake and alert in NAD. PAIN  1010  SKIN: warm, dry and intact. No breakdown or bruising.  MUSCULOSKELETAL: Patient moving all extremities spontaneously, no obvious swelling or deformities noted. Ambulates independently.  RESPIRATORY: Denies shortness of breath.Respirations unlabored.   CARDIAC: Denies CP, 2+ distal pulses; no peripheral edema  ABDOMEN: S/ND/NT, Denies nausea  : voids spontaneously, denies difficulty  Neurologic: AAO x 4; follows commands equal strength in all extremities; denies numbness/tingling. Denies dizziness  Denies new wekaness

## 2023-05-10 NOTE — ED PROVIDER NOTES
"Chief Complaint   Back Pain (Pt c/o back pain-seen for same the other day.  States wants Rx for "the patch" they put on his back. )      History Of Present Illness   Polo Small is a 62 y.o. male presenting with back pain that he has been seen for recently in the emergency room.  He states that the patch they gave him worked well and he would like a prescription for that.  There are no new symptoms.  No bowel or bladder incontinence.  No numbness or tingling in his arms or legs.  No new injury.    History obtained from:  Patient    Review of patient's allergies indicates:  No Known Allergies    No current facility-administered medications on file prior to encounter.     Current Outpatient Medications on File Prior to Encounter   Medication Sig Dispense Refill    amLODIPine (NORVASC) 5 MG tablet Take 1 tablet (5 mg total) by mouth once daily. 30 tablet 5    acetaminophen (TYLENOL) 325 MG tablet Take 2 tablets (650 mg total) by mouth every 6 (six) hours as needed. 30 tablet 0    baclofen (LIORESAL) 20 MG tablet Take 1 tablet (20 mg total) by mouth 3 (three) times daily. for 3 days 9 tablet 0    budesonide-formoterol 80-4.5 mcg (SYMBICORT) 80-4.5 mcg/actuation HFAA Inhale 2 puffs into the lungs. Controller      hydroCHLOROthiazide (HYDRODIURIL) 12.5 MG Tab Take 1 tablet (12.5 mg total) by mouth once daily. 30 tablet 5    tamsulosin (FLOMAX) 0.4 mg Cap Take 1 capsule (0.4 mg total) by mouth once daily. 30 capsule 1    [DISCONTINUED] LIDOcaine (LIDODERM) 5 % Place 1 patch onto the skin once daily. Remove & Discard patch within 12 hours or as directed by MD 15 patch 3       Past History   As per HPI and below:  Past Medical History:   Diagnosis Date    Arthritis     Asthma     BPH (benign prostatic hyperplasia)     COPD (chronic obstructive pulmonary disease)     Depression     Hepatitis C     Hypertension     Varicose veins of both lower extremities      History reviewed. No pertinent surgical history.    Social " "History     Socioeconomic History    Marital status: Single   Tobacco Use    Smoking status: Every Day     Packs/day: 1.00     Years: 25.00     Pack years: 25.00     Types: Cigarettes    Smokeless tobacco: Never   Substance and Sexual Activity    Alcohol use: Yes     Comment: 3-4 beers every other day    Drug use: Not Currently     Types: "Crack" cocaine       Family History   Problem Relation Age of Onset    Cancer Mother     Heart disease Father        Physical Exam     Vitals:    05/10/23 1631   BP: 137/78   Pulse: 79   Resp: 14   Temp: 98.5 °F (36.9 °C)   SpO2: 97%   Weight: 108.9 kg (240 lb)   Height: 6' 1" (1.854 m)     Appearance: No acute distress.  Skin: No rashes seen.  Good turgor.  No abrasions.  No ecchymoses.  Chest: Clear to auscultation bilaterally.  Good air movement.  No wheezes.  No rhonchi.  Cardiovascular: Regular rate and rhythm.  No murmurs. No gallops. No rubs.  Abdomen: Soft.  Not distended.  Nontender.  No guarding.  No rebound.  Musculoskeletal: Good range of motion all joints.  No deformities.  Neck supple.  No meningismus.  Neurologic: Motor intact.  Sensation intact.  Cranial nerves intact.  Mental Status:  Alert and oriented x 3.  Appropriate, conversant.      Medications Given     Medications   LIDOcaine 5 % patch 1 patch (has no administration in time range)       Results and Course   Labs Reviewed - No data to display    Imaging Results    None                  MDM, Impression and Plan   62 y.o. male with ongoing back pain, non ill-appearing, comfortable.  No symptoms to suggest a need for imaging.  No fever.  Epic indicates that the patient was prescribed Lidoderm, but he states that he was not aware of this prescription.  I have recent it to his pharmacy to make sure that he can get some.  Three refills were ordered by the original physician and I have continued that.  Recommended follow-up with his PCP for further care.       Final diagnoses:  [M54.6] Acute bilateral thoracic " back pain (Primary)        ED Disposition Condition    Discharge Stable          ED Prescriptions       Medication Sig Dispense Start Date End Date Auth. Provider    LIDOcaine (LIDODERM) 5 % Place 1 patch onto the skin once daily. Remove & Discard patch within 12 hours or as directed by MD 15 patch 5/10/2023 -- Ravi Celestin MD          Follow-up Information       Follow up With Specialties Details Why Contact Info    Your primary care doctor  In 1 week                 Ravi Celestin MD  05/10/23 3146

## 2023-05-10 NOTE — ED NOTES
"Patient states mid [facility] back pain x 1 -2 months, states frequent heavy lifting. States he needs " proof" of meds rx last ED visit, would like rx for Lidocaine patches.   "

## 2023-07-22 ENCOUNTER — HOSPITAL ENCOUNTER (EMERGENCY)
Facility: HOSPITAL | Age: 63
Discharge: HOME | End: 2023-07-23
Attending: EMERGENCY MEDICINE

## 2023-07-22 DIAGNOSIS — F14.921 COCAINE INTOXICATION WITH DELIRIUM: Primary | ICD-10-CM

## 2023-07-22 DIAGNOSIS — R00.0 TACHYCARDIA: ICD-10-CM

## 2023-07-22 LAB
ALBUMIN SERPL BCP-MCNC: 4.6 G/DL (ref 3.5–5.2)
ALP SERPL-CCNC: 93 U/L (ref 55–135)
ALT SERPL W/O P-5'-P-CCNC: 18 U/L (ref 10–44)
AMPHET+METHAMPHET UR QL: NEGATIVE
ANION GAP SERPL CALC-SCNC: 19 MMOL/L (ref 8–16)
APAP SERPL-MCNC: <3 UG/ML (ref 10–20)
AST SERPL-CCNC: 37 U/L (ref 10–40)
BACTERIA #/AREA URNS AUTO: ABNORMAL /HPF
BARBITURATES UR QL SCN>200 NG/ML: NEGATIVE
BASOPHILS # BLD AUTO: 0.03 K/UL (ref 0–0.2)
BASOPHILS NFR BLD: 0.3 % (ref 0–1.9)
BENZODIAZ UR QL SCN>200 NG/ML: NEGATIVE
BILIRUB SERPL-MCNC: 0.8 MG/DL (ref 0.1–1)
BILIRUB UR QL STRIP: NEGATIVE
BNP SERPL-MCNC: <10 PG/ML (ref 0–99)
BUN SERPL-MCNC: 17 MG/DL (ref 8–23)
BZE UR QL SCN: ABNORMAL
CALCIUM SERPL-MCNC: 9.2 MG/DL (ref 8.7–10.5)
CANNABINOIDS UR QL SCN: NEGATIVE
CHLORIDE SERPL-SCNC: 110 MMOL/L (ref 95–110)
CK SERPL-CCNC: 778 U/L (ref 20–200)
CLARITY UR REFRACT.AUTO: CLEAR
CO2 SERPL-SCNC: 15 MMOL/L (ref 23–29)
COLOR UR AUTO: YELLOW
CREAT SERPL-MCNC: 1.4 MG/DL (ref 0.5–1.4)
CREAT UR-MCNC: 90 MG/DL (ref 23–375)
DIFFERENTIAL METHOD: ABNORMAL
EOSINOPHIL # BLD AUTO: 0 K/UL (ref 0–0.5)
EOSINOPHIL NFR BLD: 0 % (ref 0–8)
ERYTHROCYTE [DISTWIDTH] IN BLOOD BY AUTOMATED COUNT: 11.4 % (ref 11.5–14.5)
EST. GFR  (NO RACE VARIABLE): 57.2 ML/MIN/1.73 M^2
ETHANOL SERPL-MCNC: 11 MG/DL
GLUCOSE SERPL-MCNC: 99 MG/DL (ref 70–110)
GLUCOSE UR QL STRIP: NEGATIVE
HCT VFR BLD AUTO: 35.7 % (ref 40–54)
HGB BLD-MCNC: 12.3 G/DL (ref 14–18)
HGB UR QL STRIP: ABNORMAL
HYALINE CASTS UR QL AUTO: 16 /LPF
IMM GRANULOCYTES # BLD AUTO: 0.04 K/UL (ref 0–0.04)
IMM GRANULOCYTES NFR BLD AUTO: 0.3 % (ref 0–0.5)
KETONES UR QL STRIP: ABNORMAL
LACTATE SERPL-SCNC: 1.3 MMOL/L (ref 0.5–2.2)
LEUKOCYTE ESTERASE UR QL STRIP: NEGATIVE
LYMPHOCYTES # BLD AUTO: 0.5 K/UL (ref 1–4.8)
LYMPHOCYTES NFR BLD: 3.9 % (ref 18–48)
MAGNESIUM SERPL-MCNC: 2.2 MG/DL (ref 1.6–2.6)
MCH RBC QN AUTO: 32 PG (ref 27–31)
MCHC RBC AUTO-ENTMCNC: 34.5 G/DL (ref 32–36)
MCV RBC AUTO: 93 FL (ref 82–98)
METHADONE UR QL SCN>300 NG/ML: NEGATIVE
MICROSCOPIC COMMENT: ABNORMAL
MONOCYTES # BLD AUTO: 0.8 K/UL (ref 0.3–1)
MONOCYTES NFR BLD: 6.4 % (ref 4–15)
NEUTROPHILS # BLD AUTO: 10.4 K/UL (ref 1.8–7.7)
NEUTROPHILS NFR BLD: 89.1 % (ref 38–73)
NITRITE UR QL STRIP: NEGATIVE
NRBC BLD-RTO: 0 /100 WBC
OPIATES UR QL SCN: NEGATIVE
PCP UR QL SCN>25 NG/ML: NEGATIVE
PH UR STRIP: 5 [PH] (ref 5–8)
PLATELET # BLD AUTO: 131 K/UL (ref 150–450)
PLATELET BLD QL SMEAR: ABNORMAL
PMV BLD AUTO: 10.2 FL (ref 9.2–12.9)
POTASSIUM SERPL-SCNC: 3.8 MMOL/L (ref 3.5–5.1)
PROT SERPL-MCNC: 8 G/DL (ref 6–8.4)
PROT UR QL STRIP: ABNORMAL
RBC # BLD AUTO: 3.84 M/UL (ref 4.6–6.2)
RBC #/AREA URNS AUTO: 1 /HPF (ref 0–4)
SODIUM SERPL-SCNC: 144 MMOL/L (ref 136–145)
SP GR UR STRIP: 1.01 (ref 1–1.03)
TOXICOLOGY INFORMATION: ABNORMAL
TROPONIN I SERPL DL<=0.01 NG/ML-MCNC: 0.02 NG/ML (ref 0–0.03)
TSH SERPL DL<=0.005 MIU/L-ACNC: 0.73 UIU/ML (ref 0.4–4)
URN SPEC COLLECT METH UR: ABNORMAL
WBC # BLD AUTO: 11.68 K/UL (ref 3.9–12.7)
WBC #/AREA URNS AUTO: 1 /HPF (ref 0–5)

## 2023-07-22 PROCEDURE — 63600175 PHARM REV CODE 636 W HCPCS

## 2023-07-22 PROCEDURE — 80143 DRUG ASSAY ACETAMINOPHEN: CPT

## 2023-07-22 PROCEDURE — 96360 HYDRATION IV INFUSION INIT: CPT

## 2023-07-22 PROCEDURE — 84484 ASSAY OF TROPONIN QUANT: CPT

## 2023-07-22 PROCEDURE — 80053 COMPREHEN METABOLIC PANEL: CPT

## 2023-07-22 PROCEDURE — 93010 EKG 12-LEAD: ICD-10-PCS | Mod: ,,, | Performed by: INTERNAL MEDICINE

## 2023-07-22 PROCEDURE — 84443 ASSAY THYROID STIM HORMONE: CPT

## 2023-07-22 PROCEDURE — 80307 DRUG TEST PRSMV CHEM ANLYZR: CPT

## 2023-07-22 PROCEDURE — 83735 ASSAY OF MAGNESIUM: CPT

## 2023-07-22 PROCEDURE — 96372 THER/PROPH/DIAG INJ SC/IM: CPT

## 2023-07-22 PROCEDURE — 81001 URINALYSIS AUTO W/SCOPE: CPT

## 2023-07-22 PROCEDURE — 83880 ASSAY OF NATRIURETIC PEPTIDE: CPT

## 2023-07-22 PROCEDURE — 25000003 PHARM REV CODE 250

## 2023-07-22 PROCEDURE — 99285 EMERGENCY DEPT VISIT HI MDM: CPT | Mod: 25

## 2023-07-22 PROCEDURE — 82550 ASSAY OF CK (CPK): CPT

## 2023-07-22 PROCEDURE — 83605 ASSAY OF LACTIC ACID: CPT

## 2023-07-22 PROCEDURE — 93005 ELECTROCARDIOGRAM TRACING: CPT

## 2023-07-22 PROCEDURE — 85025 COMPLETE CBC W/AUTO DIFF WBC: CPT

## 2023-07-22 PROCEDURE — 82077 ASSAY SPEC XCP UR&BREATH IA: CPT

## 2023-07-22 PROCEDURE — 93010 ELECTROCARDIOGRAM REPORT: CPT | Mod: ,,, | Performed by: INTERNAL MEDICINE

## 2023-07-22 RX ORDER — DROPERIDOL 2.5 MG/ML
5 INJECTION, SOLUTION INTRAMUSCULAR; INTRAVENOUS
Status: COMPLETED | OUTPATIENT
Start: 2023-07-22 | End: 2023-07-22

## 2023-07-22 RX ADMIN — DROPERIDOL 5 MG: 2.5 INJECTION, SOLUTION INTRAMUSCULAR; INTRAVENOUS at 06:07

## 2023-07-22 RX ADMIN — SODIUM CHLORIDE 1000 ML: 9 INJECTION, SOLUTION INTRAVENOUS at 07:07
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
## 2023-07-22 NOTE — ED NOTES
Pt belongings placed in safe; bag #: [id number]  1: cellphone  1: wallet w/ personal ID & debt cards  $70 cash    Pt belongings placed in closet x2 bags  1 pair pants  1 pair shoes  1 pair sweatpants  1 pair undergarmets  1 pair socks  1 shirt  1 lighter  1 pack cigs
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
## 2023-07-23 VITALS
DIASTOLIC BLOOD PRESSURE: 74 MMHG | SYSTOLIC BLOOD PRESSURE: 145 MMHG | HEART RATE: 87 BPM | TEMPERATURE: 99 F | RESPIRATION RATE: 14 BRPM | OXYGEN SATURATION: 96 %

## 2023-07-23 NOTE — PROVIDER PROGRESS NOTES - EMERGENCY DEPT.
Encounter Date: 7/22/2023    ED Physician Progress Notes        ED Physician Hand-off Note:    ED Course: I assumed care of patient from off-going ED physician team. Briefly, Patient is a 63-year-old male with past medical history of cocaine abuse who presents with altered mental status after using cocaine  He returned to his normal baseline mental status and was calm and cooperative in the ER after receiving droperidol 5 mg IM and 1 L normal saline IV  Labs are unremarkable    At the time of signout plan was pending -reassessment and head CT.    Medications given in the ED:    Medications   droPERidol injection 5 mg (5 mg Intramuscular Given 7/22/23 1820)   sodium chloride 0.9% bolus 1,000 mL 1,000 mL (0 mLs Intravenous Stopped 7/22/23 2044)     Imaging Results              CT Head Without Contrast (Final result)  Result time 07/23/23 00:26:28      Final result by Oscar Westbrook MD (07/23/23 00:26:28)                   Impression:      No CT evidence of acute intracranial abnormality.      Electronically signed by: Oscar Westbrook MD  Date:    07/23/2023  Time:    00:26               Narrative:    EXAMINATION:  CT HEAD WITHOUT CONTRAST    CLINICAL HISTORY:  Mental status change, unknown cause;    TECHNIQUE:  Low dose axial images were obtained through the head.  Coronal and sagittal reformations were also performed. Contrast was not administered.    COMPARISON:  CT head, 12/02/2019.    FINDINGS:  Minimal generalized cerebral volume loss.    No evidence of acute territorial infarct, hemorrhage, mass effect, or midline shift.    Ventricles are normal in size and configuration.    No displaced calvarial fracture.    Minimal mucosal thickening in the paranasal sinuses.  No air-fluid levels.  Mastoid air cells are essentially clear.                                    3:03 AM  Updated patient on results  He remains calm, cooperative, oriented and he is in agreement with discharge    Disposition:  Discharge    Patient  comfortable with plan for discharge. Patient counseled regarding exam, results, diagnosis, treatment, and plan.    Impression: Final diagnoses:  [R00.0] Tachycardia  [F14.921] Cocaine intoxication with delirium (Primary)

## 2023-07-23 NOTE — ED NOTES
"Assumed care of patient at this time. Patient is presenting hallucinations stating "a snake is on me". Pt's behavior is agitated and anxious. Pt denies S/I. Airway is open and patent, respirations are spontaneous, normal effort and rate noted. Pt is diaphoretic. Movement to all extremities noted. Bed placed in low position, side rails up x 2, soft restraints applied. Explanation of care provided to patient. Awaiting further MD orders and bed assignment, will continue POC.    "

## 2023-12-05 ENCOUNTER — HOSPITAL ENCOUNTER (EMERGENCY)
Facility: HOSPITAL | Age: 63
Discharge: HOME | End: 2023-12-05
Attending: STUDENT IN AN ORGANIZED HEALTH CARE EDUCATION/TRAINING PROGRAM
Payer: COMMERCIAL

## 2023-12-05 VITALS
WEIGHT: 240 LBS | RESPIRATION RATE: 18 BRPM | BODY MASS INDEX: 31.66 KG/M2 | HEART RATE: 107 BPM | TEMPERATURE: 99 F | DIASTOLIC BLOOD PRESSURE: 85 MMHG | OXYGEN SATURATION: 96 % | SYSTOLIC BLOOD PRESSURE: 145 MMHG

## 2023-12-05 DIAGNOSIS — I10 HYPERTENSION, UNSPECIFIED TYPE: Primary | ICD-10-CM

## 2023-12-05 DIAGNOSIS — R51.9 NONINTRACTABLE HEADACHE, UNSPECIFIED CHRONICITY PATTERN, UNSPECIFIED HEADACHE TYPE: ICD-10-CM

## 2023-12-05 PROCEDURE — 99283 EMERGENCY DEPT VISIT LOW MDM: CPT

## 2023-12-05 RX ORDER — AMLODIPINE BESYLATE 5 MG/1
5 TABLET ORAL DAILY
Qty: 30 TABLET | Refills: 3 | Status: SHIPPED | OUTPATIENT
Start: 2023-12-05 | End: 2024-04-03

## 2023-12-05 NOTE — Clinical Note
"Polo"Beth Small was seen and treated in our emergency department on 12/5/2023.  He may return to work on 12/06/2023.       If you have any questions or concerns, please don't hesitate to call.      Max Read MD"

## 2023-12-06 NOTE — ED PROVIDER NOTES
"Encounter Date: 12/5/2023       History     Chief Complaint   Patient presents with    Hypertension     Pt. Reporting a pressure headache, believes this is due to his HTN.      HPI    64 y/o M PMH COPD, HTN who presents to the ED for evaluation of high blood pressure.  Patient states that he awoke today with an achy frontal headache. He feels better now, thinks it is related to his BP. Similar visits in the past for the same.  He is mostly better now, no complaints, asking for a refill of his BP med.  Denies any f/c, numbness, tingling, neck pain, speech change, CP, dyspnea, N/V/D, abd pain, trauma, or syncope.         Review of patient's allergies indicates:  No Known Allergies  Past Medical History:   Diagnosis Date    Arthritis     Asthma     BPH (benign prostatic hyperplasia)     COPD (chronic obstructive pulmonary disease)     Depression     Hepatitis C     Hypertension     Varicose veins of both lower extremities      History reviewed. No pertinent surgical history.  Family History   Problem Relation Age of Onset    Cancer Mother     Heart disease Father      Social History     Tobacco Use    Smoking status: Every Day     Current packs/day: 1.00     Average packs/day: 1 pack/day for 25.0 years (25.0 ttl pk-yrs)     Types: Cigarettes    Smokeless tobacco: Never   Substance Use Topics    Alcohol use: Yes     Comment: 3-4 beers every other day    Drug use: Not Currently     Types: "Crack" cocaine     Review of Systems   Constitutional:  Negative for fever.   HENT:  Negative for sore throat.    Respiratory:  Negative for shortness of breath.    Cardiovascular:  Negative for chest pain.   Gastrointestinal:  Negative for nausea.   Genitourinary:  Negative for dysuria.   Musculoskeletal:  Negative for back pain.   Skin:  Negative for rash.   Neurological:  Positive for headaches. Negative for weakness.   Hematological:  Does not bruise/bleed easily.       Physical Exam     Initial Vitals [12/05/23 1830]   BP Pulse " Resp Temp SpO2   (!) 145/85 107 18 98.5 °F (36.9 °C) 96 %      MAP       --         Physical Exam    Nursing note and vitals reviewed.  Constitutional: He appears well-developed and well-nourished.   HENT:   Head: Normocephalic and atraumatic.   Eyes: Conjunctivae and EOM are normal.   Neck: Neck supple.   Normal range of motion.  Cardiovascular:  Normal rate and regular rhythm.           Pulmonary/Chest: Breath sounds normal. No respiratory distress.   Abdominal: Abdomen is soft. There is no abdominal tenderness.   Musculoskeletal:         General: No edema. Normal range of motion.      Cervical back: Normal range of motion and neck supple.     Neurological: He is alert and oriented to person, place, and time.   5/5 strength b/l UE and Les, normal gait, no dysarthria, no dysphagia, EOMI, no cerebellar signs, tongue midline, symmetric smile;    Skin: Skin is warm and dry.   Psychiatric: He has a normal mood and affect. Thought content normal.         ED Course   Procedures  Labs Reviewed   HIV 1 / 2 ANTIBODY          Imaging Results    None          Medications - No data to display  Medical Decision Making  64 y/o M here with a headache and HTN.  BP 140s here, thorough neuro exam is normal. No deficits, no indication for any CT imaging at this time.  He was offered PO meds for his headache however he declined, mostly asking for a refill of the amlodipine.  Refill was given, he declined the HCTZ.  Encouraged PCP follow-up, all questions answered, strict RTER precautions given, DC home.    Risk  Prescription drug management.                                      Clinical Impression:  Final diagnoses:  [I10] Hypertension, unspecified type (Primary)  [R51.9] Nonintractable headache, unspecified chronicity pattern, unspecified headache type          ED Disposition Condition    Discharge Stable          ED Prescriptions       Medication Sig Dispense Start Date End Date Auth. Provider    amLODIPine (NORVASC) 5 MG tablet  Take 1 tablet (5 mg total) by mouth once daily. 30 tablet 12/5/2023 4/3/2024 Max Read MD          Follow-up Information    None          Max Read MD  12/05/23 2022

## 2023-12-06 NOTE — ED TRIAGE NOTES
Polo Small, a 63 y.o. male presents to the ED w/ complaint of headache. Patient states he woke up this morning with a headache behind his eye. Patient states he believes its due to his blood pressure being high. Patient states he is supposed to be on blood pressure medication but has not taken it recently. Patient denies C/P,SOB, N/V/D, blurred vision or weakness.  Patient states having hx of COPD.    Triage note:  Chief Complaint   Patient presents with    Hypertension     Pt. Reporting a pressure headache, believes this is due to his HTN.      Review of patient's allergies indicates:  No Known Allergies  Past Medical History:   Diagnosis Date    Arthritis     Asthma     BPH (benign prostatic hyperplasia)     COPD (chronic obstructive pulmonary disease)     Depression     Hepatitis C     Hypertension     Varicose veins of both lower extremities

## 2024-08-09 NOTE — Clinical Note
"Polo"Beth Small was seen and treated in our emergency department on 8/18/2022.  He may return to work on 08/19/2022.       If you have any questions or concerns, please don't hesitate to call.      Dave Jay, DO"
"Polo"Beth Small was seen and treated in our emergency department on 8/18/2022.  He may return to work on 08/20/2022.       If you have any questions or concerns, please don't hesitate to call.      Dave Jay, DO"
"Polo"Beth Small was seen and treated in our emergency department on 8/18/2022.  He may return to work on 08/20/2022.       If you have any questions or concerns, please don't hesitate to call.      Dave Jay, DO"
Alert and oriented to person, place and time

## 2024-11-24 ENCOUNTER — HOSPITAL ENCOUNTER (INPATIENT)
Facility: HOSPITAL | Age: 64
LOS: 1 days | Discharge: HOME | DRG: 896 | End: 2024-11-25
Attending: EMERGENCY MEDICINE | Admitting: HOSPITALIST
Payer: COMMERCIAL

## 2024-11-24 DIAGNOSIS — S06.9XAA TRAUMATIC BRAIN INJURY, WITH UNKNOWN LOSS OF CONSCIOUSNESS STATUS, INITIAL ENCOUNTER: ICD-10-CM

## 2024-11-24 DIAGNOSIS — R45.1 AGITATION: Primary | ICD-10-CM

## 2024-11-24 DIAGNOSIS — Z13.6 ISCHEMIC HEART DISEASE SCREEN: ICD-10-CM

## 2024-11-24 DIAGNOSIS — I10 PRIMARY HYPERTENSION: ICD-10-CM

## 2024-11-24 DIAGNOSIS — R07.9 CHEST PAIN: ICD-10-CM

## 2024-11-24 DIAGNOSIS — F23 ACUTE PSYCHOSIS: ICD-10-CM

## 2024-11-24 DIAGNOSIS — E87.20 LACTIC ACIDOSIS: ICD-10-CM

## 2024-11-24 DIAGNOSIS — R76.8 HEPATITIS C ANTIBODY POSITIVE IN BLOOD: ICD-10-CM

## 2024-11-24 DIAGNOSIS — F10.929 ALCOHOLIC INTOXICATION WITH COMPLICATION: ICD-10-CM

## 2024-11-24 DIAGNOSIS — R41.82 AMS (ALTERED MENTAL STATUS): ICD-10-CM

## 2024-11-24 PROBLEM — I60.9 SUBARACHNOID HEMORRHAGE: Status: ACTIVE | Noted: 2024-11-24

## 2024-11-24 PROBLEM — F19.959 DRUG-INDUCED PSYCHOTIC DISORDER: Status: ACTIVE | Noted: 2024-11-24

## 2024-11-24 PROBLEM — N17.9 AKI (ACUTE KIDNEY INJURY): Status: ACTIVE | Noted: 2024-11-24

## 2024-11-24 PROBLEM — J44.1 COPD EXACERBATION: Status: ACTIVE | Noted: 2024-11-24

## 2024-11-24 PROBLEM — J45.909 ASTHMA: Status: ACTIVE | Noted: 2024-11-24

## 2024-11-24 PROBLEM — R74.8 ABNORMAL CREATINE KINASE LEVEL: Status: RESOLVED | Noted: 2024-11-24 | Resolved: 2024-11-24

## 2024-11-24 PROBLEM — J44.1 COPD EXACERBATION: Status: RESOLVED | Noted: 2024-11-24 | Resolved: 2024-11-24

## 2024-11-24 PROBLEM — E87.29 HIGH ANION GAP METABOLIC ACIDOSIS: Status: ACTIVE | Noted: 2024-11-24

## 2024-11-24 PROBLEM — F19.90 POLYSUBSTANCE USE DISORDER: Status: ACTIVE | Noted: 2024-11-24

## 2024-11-24 PROBLEM — R74.8 ABNORMAL CREATINE KINASE LEVEL: Status: ACTIVE | Noted: 2024-11-24

## 2024-11-24 PROBLEM — S06.2XAA BRAIN CONTUSION: Status: ACTIVE | Noted: 2024-11-24

## 2024-11-24 LAB
ALBUMIN SERPL BCP-MCNC: 4.3 G/DL (ref 3.5–5.2)
ALLENS TEST: ABNORMAL
ALLENS TEST: ABNORMAL
ALLENS TEST: NORMAL
ALP SERPL-CCNC: 105 U/L (ref 40–150)
ALT SERPL W/O P-5'-P-CCNC: 28 U/L (ref 10–44)
AMPHET+METHAMPHET UR QL: NEGATIVE
ANION GAP SERPL CALC-SCNC: 12 MMOL/L (ref 8–16)
ANION GAP SERPL CALC-SCNC: 17 MMOL/L (ref 8–16)
ANION GAP SERPL CALC-SCNC: 27 MMOL/L (ref 8–16)
APAP SERPL-MCNC: <3 UG/ML (ref 10–20)
APTT PPP: <21 SEC (ref 21–32)
AST SERPL-CCNC: 49 U/L (ref 10–40)
BACTERIA #/AREA URNS AUTO: ABNORMAL /HPF
BARBITURATES UR QL SCN>200 NG/ML: NEGATIVE
BASOPHILS # BLD AUTO: 0.06 K/UL (ref 0–0.2)
BASOPHILS NFR BLD: 0.4 % (ref 0–1.9)
BENZODIAZ UR QL SCN>200 NG/ML: NEGATIVE
BILIRUB SERPL-MCNC: 0.5 MG/DL (ref 0.1–1)
BILIRUB UR QL STRIP: NEGATIVE
BUN SERPL-MCNC: 16 MG/DL (ref 8–23)
BUN SERPL-MCNC: 16 MG/DL (ref 8–23)
BUN SERPL-MCNC: 17 MG/DL (ref 8–23)
BZE UR QL SCN: ABNORMAL
CALCIUM SERPL-MCNC: 8.5 MG/DL (ref 8.7–10.5)
CALCIUM SERPL-MCNC: 8.6 MG/DL (ref 8.7–10.5)
CALCIUM SERPL-MCNC: 9.3 MG/DL (ref 8.7–10.5)
CANNABINOIDS UR QL SCN: NEGATIVE
CHLORIDE SERPL-SCNC: 103 MMOL/L (ref 95–110)
CHLORIDE SERPL-SCNC: 105 MMOL/L (ref 95–110)
CHLORIDE SERPL-SCNC: 108 MMOL/L (ref 95–110)
CHOLEST SERPL-MCNC: 113 MG/DL (ref 120–199)
CHOLEST/HDLC SERPL: 2 {RATIO} (ref 2–5)
CK SERPL-CCNC: 1035 U/L (ref 20–200)
CK SERPL-CCNC: 1174 U/L (ref 20–200)
CK SERPL-CCNC: 1381 U/L (ref 20–200)
CK SERPL-CCNC: 908 U/L (ref 20–200)
CLARITY UR REFRACT.AUTO: CLEAR
CO2 SERPL-SCNC: 12 MMOL/L (ref 23–29)
CO2 SERPL-SCNC: 15 MMOL/L (ref 23–29)
CO2 SERPL-SCNC: 23 MMOL/L (ref 23–29)
COLOR UR AUTO: YELLOW
CREAT SERPL-MCNC: 1 MG/DL (ref 0.5–1.4)
CREAT SERPL-MCNC: 1.1 MG/DL (ref 0.5–1.4)
CREAT SERPL-MCNC: 1.5 MG/DL (ref 0.5–1.4)
CREAT UR-MCNC: 49 MG/DL (ref 23–375)
CRP SERPL-MCNC: 19.6 MG/L (ref 0–8.2)
DIFFERENTIAL METHOD BLD: ABNORMAL
EOSINOPHIL # BLD AUTO: 0 K/UL (ref 0–0.5)
EOSINOPHIL NFR BLD: 0.1 % (ref 0–8)
ERYTHROCYTE [DISTWIDTH] IN BLOOD BY AUTOMATED COUNT: 11.5 % (ref 11.5–14.5)
ERYTHROCYTE [SEDIMENTATION RATE] IN BLOOD BY PHOTOMETRIC METHOD: 14 MM/HR (ref 0–23)
EST. GFR  (NO RACE VARIABLE): 52.3 ML/MIN/1.73 M^2
EST. GFR  (NO RACE VARIABLE): >60 ML/MIN/1.73 M^2
EST. GFR  (NO RACE VARIABLE): >60 ML/MIN/1.73 M^2
ESTIMATED AVG GLUCOSE: 97 MG/DL (ref 68–131)
ETHANOL SERPL-MCNC: 81 MG/DL
ETHANOL SERPL-MCNC: <10 MG/DL
GLUCOSE SERPL-MCNC: 102 MG/DL (ref 70–110)
GLUCOSE SERPL-MCNC: 215 MG/DL (ref 70–110)
GLUCOSE SERPL-MCNC: 99 MG/DL (ref 70–110)
GLUCOSE UR QL STRIP: NEGATIVE
HBA1C MFR BLD: 5 % (ref 4–5.6)
HCO3 UR-SCNC: 16.6 MMOL/L (ref 24–28)
HCT VFR BLD AUTO: 40.1 % (ref 40–54)
HCV AB SERPL QL IA: REACTIVE
HDLC SERPL-MCNC: 57 MG/DL (ref 40–75)
HDLC SERPL: 50.4 % (ref 20–50)
HGB BLD-MCNC: 13.7 G/DL (ref 14–18)
HGB UR QL STRIP: ABNORMAL
HIV 1+2 AB+HIV1 P24 AG SERPL QL IA: NORMAL
IMM GRANULOCYTES # BLD AUTO: 0.11 K/UL (ref 0–0.04)
IMM GRANULOCYTES NFR BLD AUTO: 0.7 % (ref 0–0.5)
INR PPP: 1 (ref 0.8–1.2)
KETONES UR QL STRIP: ABNORMAL
LACTATE SERPL-SCNC: 1.7 MMOL/L (ref 0.5–2.2)
LDH SERPL L TO P-CCNC: 1.54 MMOL/L (ref 0.5–2.2)
LDH SERPL L TO P-CCNC: 5.32 MMOL/L (ref 0.5–2.2)
LDLC SERPL CALC-MCNC: 45.6 MG/DL (ref 63–159)
LEUKOCYTE ESTERASE UR QL STRIP: ABNORMAL
LYMPHOCYTES # BLD AUTO: 3.1 K/UL (ref 1–4.8)
LYMPHOCYTES NFR BLD: 18.5 % (ref 18–48)
MAGNESIUM SERPL-MCNC: 2.1 MG/DL (ref 1.6–2.6)
MCH RBC QN AUTO: 31.8 PG (ref 27–31)
MCHC RBC AUTO-ENTMCNC: 34.2 G/DL (ref 32–36)
MCV RBC AUTO: 93 FL (ref 82–98)
METHADONE UR QL SCN>300 NG/ML: NEGATIVE
MICROSCOPIC COMMENT: ABNORMAL
MONOCYTES # BLD AUTO: 1.5 K/UL (ref 0.3–1)
MONOCYTES NFR BLD: 9.2 % (ref 4–15)
NEUTROPHILS # BLD AUTO: 11.9 K/UL (ref 1.8–7.7)
NEUTROPHILS NFR BLD: 71.1 % (ref 38–73)
NITRITE UR QL STRIP: NEGATIVE
NONHDLC SERPL-MCNC: 56 MG/DL
NRBC BLD-RTO: 0 /100 WBC
OPIATES UR QL SCN: NEGATIVE
OSMOLALITY SERPL: 303 MOSM/KG (ref 280–300)
OSMOLALITY SERPL: 309 MOSM/KG (ref 280–300)
PCO2 BLDA: 38.6 MMHG (ref 35–45)
PCP UR QL SCN>25 NG/ML: NEGATIVE
PH SMN: 7.24 [PH] (ref 7.35–7.45)
PH UR STRIP: 7 [PH] (ref 5–8)
PHOSPHATE SERPL-MCNC: 3.5 MG/DL (ref 2.7–4.5)
PLATELET # BLD AUTO: 227 K/UL (ref 150–450)
PMV BLD AUTO: 10.4 FL (ref 9.2–12.9)
PO2 BLDA: 60 MMHG (ref 40–60)
POC BE: -11 MMOL/L
POC SATURATED O2: 86 % (ref 95–100)
POC TCO2: 18 MMOL/L (ref 24–29)
POTASSIUM SERPL-SCNC: 4.4 MMOL/L (ref 3.5–5.1)
POTASSIUM SERPL-SCNC: 4.4 MMOL/L (ref 3.5–5.1)
POTASSIUM SERPL-SCNC: 4.7 MMOL/L (ref 3.5–5.1)
PROT SERPL-MCNC: 8.1 G/DL (ref 6–8.4)
PROT UR QL STRIP: NEGATIVE
PROTHROMBIN TIME: 10.5 SEC (ref 9–12.5)
RBC # BLD AUTO: 4.31 M/UL (ref 4.6–6.2)
RBC #/AREA URNS AUTO: 1 /HPF (ref 0–4)
SALICYLATES SERPL-MCNC: <5 MG/DL (ref 15–30)
SAMPLE: ABNORMAL
SAMPLE: ABNORMAL
SAMPLE: NORMAL
SITE: ABNORMAL
SITE: ABNORMAL
SITE: NORMAL
SODIUM SERPL-SCNC: 140 MMOL/L (ref 136–145)
SODIUM SERPL-SCNC: 140 MMOL/L (ref 136–145)
SODIUM SERPL-SCNC: 142 MMOL/L (ref 136–145)
SP GR UR STRIP: 1.01 (ref 1–1.03)
SQUAMOUS #/AREA URNS AUTO: 0 /HPF
TOXICOLOGY INFORMATION: ABNORMAL
TRIGL SERPL-MCNC: 52 MG/DL (ref 30–150)
TSH SERPL DL<=0.005 MIU/L-ACNC: 1.72 UIU/ML (ref 0.4–4)
URN SPEC COLLECT METH UR: ABNORMAL
WBC # BLD AUTO: 16.67 K/UL (ref 3.9–12.7)
WBC #/AREA URNS AUTO: 20 /HPF (ref 0–5)

## 2024-11-24 PROCEDURE — 80053 COMPREHEN METABOLIC PANEL: CPT | Performed by: EMERGENCY MEDICINE

## 2024-11-24 PROCEDURE — 82550 ASSAY OF CK (CPK): CPT | Mod: 91 | Performed by: STUDENT IN AN ORGANIZED HEALTH CARE EDUCATION/TRAINING PROGRAM

## 2024-11-24 PROCEDURE — 82077 ASSAY SPEC XCP UR&BREATH IA: CPT | Mod: 91 | Performed by: STUDENT IN AN ORGANIZED HEALTH CARE EDUCATION/TRAINING PROGRAM

## 2024-11-24 PROCEDURE — 85610 PROTHROMBIN TIME: CPT | Performed by: EMERGENCY MEDICINE

## 2024-11-24 PROCEDURE — 83735 ASSAY OF MAGNESIUM: CPT | Performed by: STUDENT IN AN ORGANIZED HEALTH CARE EDUCATION/TRAINING PROGRAM

## 2024-11-24 PROCEDURE — 96375 TX/PRO/DX INJ NEW DRUG ADDON: CPT

## 2024-11-24 PROCEDURE — 63600175 PHARM REV CODE 636 W HCPCS

## 2024-11-24 PROCEDURE — 87040 BLOOD CULTURE FOR BACTERIA: CPT

## 2024-11-24 PROCEDURE — 99223 1ST HOSP IP/OBS HIGH 75: CPT | Mod: ,,, | Performed by: NEUROLOGICAL SURGERY

## 2024-11-24 PROCEDURE — 83930 ASSAY OF BLOOD OSMOLALITY: CPT | Performed by: EMERGENCY MEDICINE

## 2024-11-24 PROCEDURE — 63600175 PHARM REV CODE 636 W HCPCS: Performed by: HOSPITALIST

## 2024-11-24 PROCEDURE — 82550 ASSAY OF CK (CPK): CPT | Mod: 91

## 2024-11-24 PROCEDURE — 83930 ASSAY OF BLOOD OSMOLALITY: CPT | Mod: 91 | Performed by: STUDENT IN AN ORGANIZED HEALTH CARE EDUCATION/TRAINING PROGRAM

## 2024-11-24 PROCEDURE — 12000002 HC ACUTE/MED SURGE SEMI-PRIVATE ROOM

## 2024-11-24 PROCEDURE — 96361 HYDRATE IV INFUSION ADD-ON: CPT

## 2024-11-24 PROCEDURE — 87389 HIV-1 AG W/HIV-1&-2 AB AG IA: CPT | Performed by: EMERGENCY MEDICINE

## 2024-11-24 PROCEDURE — 99282 EMERGENCY DEPT VISIT SF MDM: CPT | Mod: ,,, | Performed by: STUDENT IN AN ORGANIZED HEALTH CARE EDUCATION/TRAINING PROGRAM

## 2024-11-24 PROCEDURE — 82550 ASSAY OF CK (CPK): CPT

## 2024-11-24 PROCEDURE — 83605 ASSAY OF LACTIC ACID: CPT

## 2024-11-24 PROCEDURE — 93010 ELECTROCARDIOGRAM REPORT: CPT | Mod: ,,, | Performed by: INTERNAL MEDICINE

## 2024-11-24 PROCEDURE — 82550 ASSAY OF CK (CPK): CPT | Mod: 91 | Performed by: EMERGENCY MEDICINE

## 2024-11-24 PROCEDURE — 85652 RBC SED RATE AUTOMATED: CPT

## 2024-11-24 PROCEDURE — 80179 DRUG ASSAY SALICYLATE: CPT | Performed by: EMERGENCY MEDICINE

## 2024-11-24 PROCEDURE — 84100 ASSAY OF PHOSPHORUS: CPT | Performed by: STUDENT IN AN ORGANIZED HEALTH CARE EDUCATION/TRAINING PROGRAM

## 2024-11-24 PROCEDURE — 80048 BASIC METABOLIC PNL TOTAL CA: CPT | Mod: XB | Performed by: EMERGENCY MEDICINE

## 2024-11-24 PROCEDURE — 80143 DRUG ASSAY ACETAMINOPHEN: CPT | Performed by: EMERGENCY MEDICINE

## 2024-11-24 PROCEDURE — 81001 URINALYSIS AUTO W/SCOPE: CPT | Mod: XB | Performed by: EMERGENCY MEDICINE

## 2024-11-24 PROCEDURE — 83036 HEMOGLOBIN GLYCOSYLATED A1C: CPT | Performed by: EMERGENCY MEDICINE

## 2024-11-24 PROCEDURE — 63600175 PHARM REV CODE 636 W HCPCS: Performed by: EMERGENCY MEDICINE

## 2024-11-24 PROCEDURE — 25000003 PHARM REV CODE 250

## 2024-11-24 PROCEDURE — 25000003 PHARM REV CODE 250: Performed by: EMERGENCY MEDICINE

## 2024-11-24 PROCEDURE — 99285 EMERGENCY DEPT VISIT HI MDM: CPT | Mod: 25

## 2024-11-24 PROCEDURE — 93010 ELECTROCARDIOGRAM REPORT: CPT | Mod: 76,,, | Performed by: INTERNAL MEDICINE

## 2024-11-24 PROCEDURE — 93005 ELECTROCARDIOGRAM TRACING: CPT

## 2024-11-24 PROCEDURE — 96365 THER/PROPH/DIAG IV INF INIT: CPT

## 2024-11-24 PROCEDURE — 80048 BASIC METABOLIC PNL TOTAL CA: CPT | Mod: 91,XB | Performed by: STUDENT IN AN ORGANIZED HEALTH CARE EDUCATION/TRAINING PROGRAM

## 2024-11-24 PROCEDURE — 63600175 PHARM REV CODE 636 W HCPCS: Performed by: STUDENT IN AN ORGANIZED HEALTH CARE EDUCATION/TRAINING PROGRAM

## 2024-11-24 PROCEDURE — 99900035 HC TECH TIME PER 15 MIN (STAT)

## 2024-11-24 PROCEDURE — 82077 ASSAY SPEC XCP UR&BREATH IA: CPT | Performed by: EMERGENCY MEDICINE

## 2024-11-24 PROCEDURE — 85730 THROMBOPLASTIN TIME PARTIAL: CPT | Performed by: EMERGENCY MEDICINE

## 2024-11-24 PROCEDURE — 87086 URINE CULTURE/COLONY COUNT: CPT | Performed by: EMERGENCY MEDICINE

## 2024-11-24 PROCEDURE — 84443 ASSAY THYROID STIM HORMONE: CPT | Performed by: EMERGENCY MEDICINE

## 2024-11-24 PROCEDURE — 86140 C-REACTIVE PROTEIN: CPT

## 2024-11-24 PROCEDURE — 86803 HEPATITIS C AB TEST: CPT | Performed by: EMERGENCY MEDICINE

## 2024-11-24 PROCEDURE — 87086 URINE CULTURE/COLONY COUNT: CPT | Mod: 59

## 2024-11-24 PROCEDURE — 80061 LIPID PANEL: CPT | Performed by: STUDENT IN AN ORGANIZED HEALTH CARE EDUCATION/TRAINING PROGRAM

## 2024-11-24 PROCEDURE — 80307 DRUG TEST PRSMV CHEM ANLYZR: CPT | Performed by: EMERGENCY MEDICINE

## 2024-11-24 PROCEDURE — 85025 COMPLETE CBC W/AUTO DIFF WBC: CPT | Performed by: EMERGENCY MEDICINE

## 2024-11-24 PROCEDURE — 81001 URINALYSIS AUTO W/SCOPE: CPT | Mod: 91,XB

## 2024-11-24 PROCEDURE — 82803 BLOOD GASES ANY COMBINATION: CPT

## 2024-11-24 PROCEDURE — 82010 KETONE BODYS QUAN: CPT

## 2024-11-24 RX ORDER — NALOXONE HCL 0.4 MG/ML
0.02 VIAL (ML) INJECTION
Status: DISCONTINUED | OUTPATIENT
Start: 2024-11-24 | End: 2024-11-25 | Stop reason: HOSPADM

## 2024-11-24 RX ORDER — HALOPERIDOL 5 MG/ML
5 INJECTION INTRAMUSCULAR ONCE AS NEEDED
Status: DISCONTINUED | OUTPATIENT
Start: 2024-11-24 | End: 2024-11-24

## 2024-11-24 RX ORDER — HALOPERIDOL 5 MG/ML
5 INJECTION INTRAMUSCULAR EVERY 6 HOURS PRN
Status: DISCONTINUED | OUTPATIENT
Start: 2024-11-24 | End: 2024-11-25 | Stop reason: HOSPADM

## 2024-11-24 RX ORDER — LORAZEPAM 2 MG/ML
1 INJECTION INTRAMUSCULAR
Status: COMPLETED | OUTPATIENT
Start: 2024-11-24 | End: 2024-11-24

## 2024-11-24 RX ORDER — AMLODIPINE BESYLATE 5 MG/1
5 TABLET ORAL DAILY
Status: DISCONTINUED | OUTPATIENT
Start: 2024-11-24 | End: 2024-11-25 | Stop reason: HOSPADM

## 2024-11-24 RX ORDER — GLUCAGON 1 MG
1 KIT INJECTION
Status: DISCONTINUED | OUTPATIENT
Start: 2024-11-24 | End: 2024-11-25 | Stop reason: HOSPADM

## 2024-11-24 RX ORDER — HYDRALAZINE HYDROCHLORIDE 25 MG/1
25 TABLET, FILM COATED ORAL DAILY PRN
Status: DISCONTINUED | OUTPATIENT
Start: 2024-11-24 | End: 2024-11-25 | Stop reason: HOSPADM

## 2024-11-24 RX ORDER — HALOPERIDOL 5 MG/ML
5 INJECTION INTRAMUSCULAR ONCE AS NEEDED
Status: COMPLETED | OUTPATIENT
Start: 2024-11-24 | End: 2024-11-24

## 2024-11-24 RX ORDER — LABETALOL HYDROCHLORIDE 5 MG/ML
10 INJECTION, SOLUTION INTRAVENOUS
Status: COMPLETED | OUTPATIENT
Start: 2024-11-24 | End: 2024-11-24

## 2024-11-24 RX ORDER — IBUPROFEN 200 MG
16 TABLET ORAL
Status: DISCONTINUED | OUTPATIENT
Start: 2024-11-24 | End: 2024-11-25 | Stop reason: HOSPADM

## 2024-11-24 RX ORDER — HALOPERIDOL 5 MG/ML
INJECTION INTRAMUSCULAR
Status: COMPLETED
Start: 2024-11-24 | End: 2024-11-24

## 2024-11-24 RX ORDER — DOCUSATE SODIUM 100 MG
300 CAPSULE ORAL
Status: DISCONTINUED | OUTPATIENT
Start: 2024-11-24 | End: 2024-11-25 | Stop reason: HOSPADM

## 2024-11-24 RX ORDER — SODIUM CHLORIDE 0.9 % (FLUSH) 0.9 %
10 SYRINGE (ML) INJECTION EVERY 12 HOURS PRN
Status: DISCONTINUED | OUTPATIENT
Start: 2024-11-24 | End: 2024-11-25 | Stop reason: HOSPADM

## 2024-11-24 RX ORDER — SODIUM CHLORIDE, SODIUM LACTATE, POTASSIUM CHLORIDE, CALCIUM CHLORIDE 600; 310; 30; 20 MG/100ML; MG/100ML; MG/100ML; MG/100ML
INJECTION, SOLUTION INTRAVENOUS CONTINUOUS
Status: ACTIVE | OUTPATIENT
Start: 2024-11-24 | End: 2024-11-25

## 2024-11-24 RX ORDER — NICARDIPINE HYDROCHLORIDE 0.2 MG/ML
0-15 INJECTION INTRAVENOUS CONTINUOUS
Status: DISCONTINUED | OUTPATIENT
Start: 2024-11-24 | End: 2024-11-24

## 2024-11-24 RX ORDER — IBUPROFEN 200 MG
24 TABLET ORAL
Status: DISCONTINUED | OUTPATIENT
Start: 2024-11-24 | End: 2024-11-25 | Stop reason: HOSPADM

## 2024-11-24 RX ORDER — LABETALOL HCL 20 MG/4 ML
10 SYRINGE (ML) INTRAVENOUS EVERY 4 HOURS PRN
Status: DISCONTINUED | OUTPATIENT
Start: 2024-11-24 | End: 2024-11-25 | Stop reason: HOSPADM

## 2024-11-24 RX ADMIN — AMLODIPINE BESYLATE 5 MG: 5 TABLET ORAL at 06:11

## 2024-11-24 RX ADMIN — SODIUM CHLORIDE 1000 ML: 9 INJECTION, SOLUTION INTRAVENOUS at 02:11

## 2024-11-24 RX ADMIN — SODIUM CHLORIDE 1000 ML: 9 INJECTION, SOLUTION INTRAVENOUS at 01:11

## 2024-11-24 RX ADMIN — LABETALOL HYDROCHLORIDE 10 MG: 5 INJECTION, SOLUTION INTRAVENOUS at 05:11

## 2024-11-24 RX ADMIN — LABETALOL HYDROCHLORIDE 10 MG: 5 INJECTION, SOLUTION INTRAVENOUS at 07:11

## 2024-11-24 RX ADMIN — LORAZEPAM 1 MG: 2 INJECTION INTRAMUSCULAR at 12:11

## 2024-11-24 RX ADMIN — HYDRALAZINE HYDROCHLORIDE 25 MG: 25 TABLET ORAL at 09:11

## 2024-11-24 RX ADMIN — SODIUM CHLORIDE 1000 ML: 9 INJECTION, SOLUTION INTRAVENOUS at 04:11

## 2024-11-24 RX ADMIN — SODIUM CHLORIDE, POTASSIUM CHLORIDE, SODIUM LACTATE AND CALCIUM CHLORIDE 1000 ML: 600; 310; 30; 20 INJECTION, SOLUTION INTRAVENOUS at 12:11

## 2024-11-24 RX ADMIN — HYDRALAZINE HYDROCHLORIDE 25 MG: 25 TABLET ORAL at 02:11

## 2024-11-24 RX ADMIN — SODIUM CHLORIDE, POTASSIUM CHLORIDE, SODIUM LACTATE AND CALCIUM CHLORIDE: 600; 310; 30; 20 INJECTION, SOLUTION INTRAVENOUS at 05:11

## 2024-11-24 RX ADMIN — Medication 300 ML: at 11:11

## 2024-11-24 RX ADMIN — NICARDIPINE HYDROCHLORIDE 5 MG/HR: 0.2 INJECTION, SOLUTION INTRAVENOUS at 08:11

## 2024-11-24 RX ADMIN — Medication 300 ML: at 08:11

## 2024-11-24 RX ADMIN — SODIUM CHLORIDE, POTASSIUM CHLORIDE, SODIUM LACTATE AND CALCIUM CHLORIDE 1000 ML: 600; 310; 30; 20 INJECTION, SOLUTION INTRAVENOUS at 01:11

## 2024-11-24 RX ADMIN — Medication 300 ML: at 03:11

## 2024-11-24 RX ADMIN — HALOPERIDOL LACTATE 5 MG: 5 INJECTION, SOLUTION INTRAMUSCULAR at 01:11

## 2024-11-24 NOTE — ASSESSMENT & PLAN NOTE
- CT head shows stable right frontal hemorrhagic contusion  - seen by neurosurgery with no immediate plan for intervention; thanks for the assist  - continue to monitor

## 2024-11-24 NOTE — ASSESSMENT & PLAN NOTE
Patients blood pressure range in the last 24 hours was: BP  Min: 114/76  Max: 171/86.The patient's inpatient anti-hypertensive regimen is listed below:  Current Antihypertensives       Plan  - BP is controlled, no changes needed to their regimen  - started on nicardipine drip in the ED for about 30 minutes before it was discontinued  - received two doses of labetalol 10mg   - will consider starting BP medication regimen
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
## 2024-11-24 NOTE — CONSULTS
[facility]  Bedside Consultation Note  11/24/2024  10:03 AM    CHIEF COMPLAINT / REASON FOR CONSULT:  elevated GAP, possible toxic alcohol    CONSULTING TEAM: ED/    #AGAP metabolic acidosis/AMS, c/f toxic alcohol  -History from chart and patient, known hx of cocaine and ETOH abuse per other MRN. He admits to both today (just beer), denies any others, denies any toxic alcohol ingestion  -On exam he is awake, talking, no signs of any toxidrome currently, vitals normal  -Labs: most recent BMP: AGAP resolved   -bicarb 23   -ETOH <10   -Osm Calculated: 298   -Osm Measured: 309    -GAP is 309-298 = 11 (just one above normal range- which is okay)  -EKG: pending  DDX is broad for high AGAP with lactate. He has received several liters of IVF so far, and his lactate has now normalized and GAP resolved. Overall, I have low suspicion that this is a toxic alcohol. Improving lab values with fluid resuscitation is a reassuring sign. Typically acidosis will continue/worse despite IVF in an ethylene glycol or methanol type ingestion.  More likely source is an AKA, dehydration, sympathomimetic toxidrome (resolving) type picture.    Recommendations:      -Admission to  for obs, tele/BP monitoring   -No need for fomepizole at this time   -Continue IVF as needed, ensure tolerating PO and diet   -Folate/thiamine supplementation   -Benzos PRN for any agitation   -Watch for signs of ETOH withdrawal   -Follow CMPs    Tox will follow.      HISTORY OF PRESENT ILLNESS:     65 y/o M PMH HTN, COPD, polysubstance use who arrived yesterday evening by police/EMS with AMS.  He was reportedly found wandering the streets naked.  He was stating bizarre statements and ultimately tazed by police.  He required medications, and was found to have a SAH. Labs show AGAP metabolic acidosis.  Patient seen bedside this morning, he does not recall the events of last night. He is more lucid, awake; admits to cocaine and ETOH use last night.  Denies any antifreeze, windshield wiper, rubbing alcohol, or other atypical alcohol ingestion.    Tox consulted for recs.      History reviewed. No pertinent past medical history.  History reviewed. No pertinent surgical history.  No current facility-administered medications on file prior to encounter.     No current outpatient medications on file prior to encounter.         REVIEW OF SYSTEMS:    All other systems reviewed and negative except as noted in HPI    PHYSICAL EXAM:    General: awake, talking  Head: noted forehead lacerations  HEENT: pupils midrange and reactive  Cardiovascular: RRR.   Pulm/Chest: Lungs clear to auscultation. No respiratory distress.  Neuro:   -no clonus  -Normal DTRs    Skin: Warm, dry, and intact.  Psych: cooperative, bizarre statements at times    EKG:  pending      LAB reviewed    All lab assays reviewed and included medical decision-making    IMAGING reviewed      All imaging independently reviewed and included medical decision-making    ASSESSMENT:     65 y/o M here with bizarre behavior, ETOH use/cocaine use, now with improving AGAP    PLAN: As above    DIAGNOSES: cocaine intoxication, etoh intoxication, resolving AGAP metabolic acidosis    >40 minutes spent in consultation on patient and speaking with the primary team.    Max Read MD  Medical Toxicology Attending  Please call on-call provider with any questions  11/24/2024  10:03 AM

## 2024-11-24 NOTE — Clinical Note
Diagnosis: Agitation [[id number]]   Future Attending Provider: GEOVANNY BRYAN [[id number]]   Reason for IP Medical Treatment  (Clinical interventions that can only be accomplished in the IP setting? ) :: AMS
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
## 2024-11-24 NOTE — ASSESSMENT & PLAN NOTE
- Patient found running in the streets naked and having hallucinations  - may be drug induced as cocaine and alcohol was found in patient's system  - PEC'ed on admission  - psych consulted; thanks for the assist    - ok to d/c PEC order

## 2024-11-24 NOTE — ASSESSMENT & PLAN NOTE
- elevated CK on admission of 908, trini to 1035  - given 3L of NS and 1L of LR  - likely alcohol and cocaine induced  - continue to monitor and hydrate as needed
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
## 2024-11-24 NOTE — ED NOTES
Assumed care of this pt at this time  Patient identifiers verified and correct for Ploo Small   LOC: The patient is aao x4  APPEARANCE: Patient appears comfortable and in no acute distress  SKIN: The skin is warm and dry, color consistent with ethnicity, patient has normal skin turgor and moist mucus membranes, +hematoma to forehead   MUSCULOSKELETAL: Patient moving all extremities spontaneously, +neck pain  RESPIRATORY: Airway is open and patent, respirations are spontaneous, patient has a normal effort and rate, no accessory muscle use noted, pt placed on continuous pulse ox with O2 sats noted at 96% on room air.  CARDIAC: Pt placed on cardiac monitor. Patient has a tachy rate, no edema noted, capillary refill < 3 seconds.   GASTRO: Soft and non tender to palpation, no distention noted  : Pt denies any pain or frequency with urination.  NEURO: Pt opens eyes to voice, behavior appropriate to situation, follows commands, facial expression symmetrical, bilateral hand grasp equal and even, purposeful motor response noted, normal sensation in all extremities when touched with a finger.

## 2024-11-24 NOTE — ED NOTES
Pt remains in paper scrubs, resting in stretcher comfortably - with side rails up, locked, and in lowest position. Chest rise and fall noted; breathing equal, even, and unlabored. Sitter remains at bedside in direct visual contact, charting per protocol every 15 minutes. Medically necessary equipment remains in room. No other equipment or belongings are in the patients room to prevent self harm or injury. Pt aware of plan of care. No acute distress noted and no needs expressed at this time. Will continue to assess periodically.
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
## 2024-11-24 NOTE — PHARMACY MED REC
"      Admission Medication History     The home medication history was taken by Nicolette Muniz.    You may go to "Admission" then "Reconcile Home Medications" tabs to review and/or act upon these items.     The home medication list has been updated by the Pharmacy department.   Please read ALL comments highlighted in yellow.   Please address this information as you see fit.    Feel free to contact us if you have any questions or require assistance.      Unable to assess patient at this time. No medication fill history.          Potential issues to be addressed PRIOR TO DISCHARGE  Please discuss with the patient barriers to adherence with medication treatment plans  Patient requires education regarding drug therapies     Nicolette Muniz  EXT [id number]            .          " Called patient, spoke with daughter,Lisseth, explaiend Namenda RX has been sent to [facility] and our office has no samples of Lisseth Gloria voiced understanding

## 2024-11-24 NOTE — ED PROVIDER NOTES
"Encounter Date: 11/24/2024       History     Chief Complaint   Patient presents with    Mental Health Problem     Found naked running around on street. Tazed by [employer]SIE     Male patient of unknown identity or age presenting to the ED after being found running naked in the street.  Law enforcement reportedly encountered the patient who was hyper Anabaptism and agitated, attempted to detain patient requiring taser deployment and transported to ED for evaluation.  Here in the ED, patient is repeating hyper Anabaptism phrases ("they put the serpent in my house", " use me, Lord", "forgive them, they do not know what they are doing") and not responding to verbal cues, commands, or questions from care team.  Patient's name is reportedly Polo, however no documentation or identifying information provided to confirm that.    On re-evaluation, patient remains uncooperative with questioning, but when asked what happened tonight or how he is doing, patient responds with "go back to where you came from, in the name of [place]".  At time of re-evaluation, noticed odor of alcohol on patient's breath.    The history is limited by the condition of the patient.     Review of patient's allergies indicates:  Not on File  History reviewed. No pertinent past medical history.  History reviewed. No pertinent surgical history.  No family history on file.  Social History     Tobacco Use    Smoking status: Unknown     Review of Systems   Unable to perform ROS: Psychiatric disorder       Physical Exam     Initial Vitals [11/24/24 0010]   BP Pulse Resp Temp SpO2   114/76 90 18 97 °F (36.1 °C) 99 %      MAP       --         Physical Exam    Constitutional: He appears well-developed and well-nourished. He is uncooperative. He is restrained.   HENT:   Head: Head is with laceration.       Eyes: Conjunctivae are normal. Right pupil is reactive. Left pupil is reactive.   Bilateral pupils dilated and not reactive, unable to assess EOM due to patient not " participating in exam   Cardiovascular:    Tachycardia present.         Pulses:       Radial pulses are 2+ on the right side and 2+ on the left side.        Dorsalis pedis pulses are 2+ on the right side and 2+ on the left side.   Pulmonary/Chest: No respiratory distress.     Neurological: GCS eye subscore is 4. GCS verbal subscore is 3. GCS motor subscore is 5.   Reflex Scores:       Patellar reflexes are 1+ on the right side and 1+ on the left side.      MENTAL STATUS EXAMINATION:  Appearance: inappropriately dressed, inadequately groomed    Behavior & Attitude: agitated, uncooperative, odd, poorly related, minimally participative, under inadequate behavioral control, unable to redirect, poor eye contact    Movements & Motor Activity: +psychomotor retardation, +rigidity  no psychomotor agitation, no tics, no tremor    Speech & Language: normal rate, normal volume, decreased quantity, spontaneous  non-reciprocal    Affect:   unknown/unable to assess  Thought Process & Associations: perseverative, poverty of thought  disorganized, illogical, incoherent    Thought Content & Perceptions: +delusions, +ideas of reference, +hyperreligiosity  no paranoid ideation    Sensorium: awake    Orientation:   unknown/unable to assess  Attention & Concentration: inattentive to conversation    Fund of Knowledge:   unknown/unable to assess  Insight:   unknown/unable to assess  Judgment:   unknown/unable to assess      ED Course   Procedures  Labs Reviewed   CBC W/ AUTO DIFFERENTIAL - Abnormal       Result Value    WBC 16.67 (*)     RBC 4.31 (*)     Hemoglobin 13.7 (*)     Hematocrit 40.1      MCV 93      MCH 31.8 (*)     MCHC 34.2      RDW 11.5      Platelets 227      MPV 10.4      Immature Granulocytes 0.7 (*)     Gran # (ANC) 11.9 (*)     Immature Grans (Abs) 0.11 (*)     Lymph # 3.1      Mono # 1.5 (*)     Eos # 0.0      Baso # 0.06      nRBC 0      Gran % 71.1      Lymph % 18.5      Mono % 9.2      Eosinophil %  0.1      Basophil % 0.4      Differential Method Automated      Narrative:     Release to patient->Immediate   COMPREHENSIVE METABOLIC PANEL - Abnormal    Sodium 142      Potassium 4.7      Chloride 103      CO2 12 (*)     Glucose 99      BUN 17      Creatinine 1.5 (*)     Calcium 9.3      Total Protein 8.1      Albumin 4.3      Total Bilirubin 0.5      Alkaline Phosphatase 105      AST 49 (*)     ALT 28      eGFR 52.3 (*)     Anion Gap 27 (*)     Narrative:     Release to patient->Immediate   DRUG SCREEN PANEL, URINE EMERGENCY - Abnormal    Benzodiazepines Negative      Methadone metabolites Negative      Cocaine (Metab.) Presumptive Positive (*)     Opiate Scrn, Ur Negative      Barbiturate Screen, Ur Negative      Amphetamine Screen, Ur Negative      THC Negative      Phencyclidine Negative      Creatinine, Urine 49.0      Toxicology Information SEE COMMENT      Narrative:     Specimen Source->Urine   ALCOHOL,MEDICAL (ETHANOL) - Abnormal    Alcohol, Serum 81 (*)     Narrative:     Release to patient->Immediate   ACETAMINOPHEN LEVEL - Abnormal    Acetaminophen (Tylenol), Serum <3.0 (*)     Narrative:     Release to patient->Immediate   SALICYLATE LEVEL - Abnormal    Salicylate Lvl <5.0 (*)     Narrative:     Release to patient->Immediate   HEPATITIS C ANTIBODY - Abnormal    Hepatitis C Ab Reactive (*)     Narrative:     Release to patient->Immediate   CK - Abnormal     (*)    BASIC METABOLIC PANEL - Abnormal    Sodium 140      Potassium 4.4      Chloride 108      CO2 15 (*)     Glucose 102      BUN 16      Creatinine 1.1      Calcium 8.6 (*)     Anion Gap 17 (*)     eGFR >60.0     OSMOLALITY, SERUM - Abnormal    Osmolality 303 (*)    ISTAT PROCEDURE - Abnormal    POC PH 7.241 (*)     POC PCO2 38.6      POC PO2 60      POC HCO3 16.6 (*)     POC BE -11 (*)     POC SATURATED O2 86      POC TCO2 18 (*)     Sample VENOUS      Site Other      Allens Test N/A     ISTAT LACTATE - Abnormal    POC Lactate 5.32 (*)      Sample VENOUS      Site Other      Allens Test N/A     TSH    TSH 1.717      Narrative:     Release to patient->Immediate   HIV 1 / 2 ANTIBODY    HIV 1/2 Ag/Ab Non-reactive      Narrative:     Release to patient->Immediate   APTT    aPTT <21.0     PROTIME-INR    Prothrombin Time 10.5      INR 1.0     URINALYSIS, REFLEX TO URINE CULTURE   HEPATITIS C RNA, QUANTITATIVE, PCR   BETA - HYDROXYBUTYRATE, SERUM   CK   ISTAT LACTATE    POC Lactate 1.54      Sample VENOUS      Site Other      Allens Test N/A            Imaging Results               CT Head Without Contrast (Final result)  Result time 11/24/24 04:21:55      Final result by Grover Bedoya MD (11/24/24 04:21:55)                   Impression:      Small inferior right frontal lobe hemorrhagic contusions.  No significant mass effect.  No midline shift.    Small right supraorbital hematoma.    This report was flagged in Epic as abnormal.    Findings above were relayed by Dejan Pate MD  by telephone to Yesenia Espinoza MD on 11/24/2024 at 04:00.    Electronically signed by resident: Dejan Pate  Date:    11/24/2024  Time:    04:00    Electronically signed by: Grover Bedoya MD  Date:    11/24/2024  Time:    04:21               Narrative:    EXAMINATION:  CT HEAD WITHOUT CONTRAST    CLINICAL HISTORY:  Mental status change, unknown cause;    TECHNIQUE:  Low dose axial CT images obtained throughout the head without the use of intravenous contrast.  Axial, sagittal and coronal reconstructions were performed.    COMPARISON:  None.    FINDINGS:  Small foci of hyperattenuation in the inferior right frontal lobe (for example axial 2:12, 17), compatible with hemorrhagic contusions.  Probable associated trace subarachnoid blood products.  No significant mass effect.  No midline shift    Ventricles and sulci are normal in size without evidence of hydrocephalus.    Partial empty sella.    Small right supraorbital soft tissue induration and swelling.  No displaced  calvarial fracture.  Mastoid air cells and paranasal sinuses are essentially clear.                                       Medications   haloperidol lactate injection 5 mg (has no administration in time range)   LORazepam injection 1 mg (1 mg Intravenous Given 11/24/24 0030)   sodium chloride 0.9% bolus 1,000 mL 1,000 mL (0 mLs Intravenous Stopped 11/24/24 0208)   haloperidol lactate injection 5 mg ( Intravenous Override Pull 11/24/24 0430)   sodium chloride 0.9% bolus 1,000 mL 1,000 mL (0 mLs Intravenous Stopped 11/24/24 0315)   sodium chloride 0.9% bolus 1,000 mL 1,000 mL (0 mLs Intravenous Stopped 11/24/24 0541)   labetaloL injection 10 mg (10 mg Intravenous Given 11/24/24 0554)     Medical Decision Making  Unknown name and age male brought to the ED by law enforcement after being found naked and agitated with hyper religiosity. History and physical exam as above. Initial vital signs stable and non-actionable. Initial work-up to include: Labs (CBC, CMP, CPK, UA, ethanol level, salicylate level, acetaminophen level, UDS, TSH), Imaging (CT Head,), EKG    Differential diagnosis for this patient includes, but is not limited to:  Dehydration, substance induced psychosis, primary psychosis (schizophrenia, song, psychotic disorder), acute intoxication.  PEC ordered    Results of workup include mildly elevated CPK (likely secondary to Taser), positive ethanol level at 81, elevated creatinine to 1.5, low bicarb and elevated anion gap concerning for anion gap metabolic acidosis.  Ordered additional blood gas, lactate, and beta hydroxybutyrate as well as 2 L NS.  We will plan on reassessing CPK and BMP after fluid resuscitation.  VBG and lactate showing elevated lactate to 5 with acidosis, overall most concerned for acute lactic acidosis, which improved on repeat BMP after initial fluid resuscitation (resolving WEI, down trending anion gap, up trending bicarb).  Repeat CPK after fluid resuscitation down trending from 908 to  800s (not reported in the lab results due to computer error, charted by nursing).    Following 1 dose of Haldol for non redirectable agitation, patient became more responsive and was conversant with staff.  Patient stated his name was Polo Mckenna and explained that tonight he was drinking and smoking crack.  Patient states he has a history of asthma and no other psychiatric conditions and does not take daily medication.    Notified of urgent CT results (small right frontal traumatic subarachnoid) and discussed with Neurosurgery, who recommended repeating CT head in 4 hours to assess evolution of bleed, at time of consultation, patient was neuro intact and without acute complaint.  Soon after, notified by nurse that patient was seeing and hearing snakes in the room similar to previous comments prior to antipsychotic administration.  Audio/visual hallucinations are atypical in intracranial hemorrhage and more consistent with substance induced psychosis, repeated Haldol 5 mg to good effect, patient denied ongoing AVH after re-dosing.    At time of patient handoff, patient is active, currently pending repeat CT head, neurosurgery read engagement and recommendations, and Toxicology consult.  Care of patient handed off to oncoming physician, Dr Nadine Sanchez and Dr Maria A De León.        Amount and/or Complexity of Data Reviewed  Labs: ordered.  Radiology: ordered.    Risk  Prescription drug management.              Attending Attestation:   Physician Attestation Statement for Resident:  As the supervising MD   Physician Attestation Statement: I have personally seen and examined this patient.   I agree with the above history.  -:   As the supervising MD I agree with the above PE.     As the supervising MD I agree with the above treatment, course, plan, and disposition.   -: 64-year-old male presenting to emergency department after being tased by police and falling.  Patient acutely psychotic requiring chemical  restraint on arrival.  Pec filed.      Labs remarkable for leukocytosis, anion gap metabolic acidosis, and elevated CPK.  He received 2 L of IV fluid, repeat CMP demonstrated improvement of metabolic acidosis but still with significant anion gap and bicarb of 15.  Initial serum lactate is 5.0.  CT head demonstrated hemorrhagic contusion and subarachnoid hemorrhage.  Patient not anticoagulated.  Mental status improved somewhat after Haldol administration but still remains acutely psychotic.  Neurosurgery consulted, recommending repeat CT head.  Signed out to oncoming team pending repeat lactate and neurosurgery recommendations for final disposition.   I have reviewed and agree with the residents interpretation of the following: lab data and CT scans.  I have reviewed the following: old records at this facility.        Attending Critical Care:   Critical Care Times:   Direct Patient Care (initial evaluation, reassessments, and time considering the case)................................................................45 minutes.   Additional History from reviewing old medical records or taking additional history from the family, EMS, PCP, etc.......................10 minutes.   Ordering, Reviewing, and Interpreting Diagnostic Studies...............................................................................................................25 minutes.   Documentation..................................................................................................................................................................................10 minutes.   Consultation with other Physicians. .................................................................................................................................................5 minutes.   ==============================================================  Total Critical Care Time - exclusive of procedural time: 95  minutes.  ==============================================================  Critical care was necessary to treat or prevent imminent or life-threatening deterioration of the following conditions: metabolic crisis, CNS failure and trauma.   Critical care was time spent personally by me on the following activities: examination of patient, ordering lab, x-rays, and/or EKG, ordering and performing treatments and interventions, discussion with consultants, re-evaluation of patient's conition and evaluation of patient's response to treatment.   Critical Care Condition: potentially life-threatening           ED Course as of 11/24/24 2154   Sun Nov 24, 2024   0410 Discussed findings on CT head with Neurosurgery, recommend repeating CT head in 4 hours [AB]   0821 POC Lactate: 1.54 [NN]   1145 EKG with sinus rhythm, rate 78, no STEMI [NN]      ED Course User Index  [AB] Oziel Dhillon MD  [NN] Nadine Sanchez MD Signed, Alexander Belardo-Flowers, MD  Emergency Medicine, PGY-1  [facility]                   Clinical Impression:  Final diagnoses:  [R45.1] Agitation (Primary)  [F23] Acute psychosis  [F10.929] Alcoholic intoxication with complication  [E87.20] Lactic acidosis  [S06.9XAA] Traumatic brain injury, with unknown loss of consciousness status, initial encounter                 Oziel Dhillon MD  Resident  11/24/24 0142       Yesenia Espinoza MD  11/24/24 1380

## 2024-11-24 NOTE — ED NOTES
Pt remains in paper scrubs, resting in stretcher comfortably - with side rails up, locked, and in lowest position. Chest rise and fall noted; breathing equal, even, and unlabored. Gabino Gould, remains at bedside in direct visual contact, charting per protocol every 15 minutes. Medically necessary equipment remains in room. No other equipment or belongings are in the patients room to prevent self harm or injury. Pt aware of plan of care. No acute distress noted and no needs expressed at this time. Will continue to assess periodically.

## 2024-11-24 NOTE — ED NOTES
Pt remains in paper scrubs, resting in stretcher comfortably - with side rails up, locked, and in lowest position. Chest rise and fall noted; breathing equal, even, and unlabored. Gabino Gould, remains at bedside in direct visual contact, charting per protocol every 15 minutes. No equipment or belongings are in the patients room to prevent self harm or injury. Pt aware of plan of care. No acute distress noted and no needs expressed at this time. Will continue to assess periodically.

## 2024-11-24 NOTE — SUBJECTIVE & OBJECTIVE
(Not in a hospital admission)      Review of patient's allergies indicates:  Not on File    History reviewed. No pertinent past medical history.  History reviewed. No pertinent surgical history.  Family History    None       Tobacco Use    Smoking status: Unknown    Smokeless tobacco: Not on file   Substance and Sexual Activity    Alcohol use: Not on file    Drug use: Not on file    Sexual activity: Not on file     Review of Systems  Objective:     Weight: 81.6 kg (180 lb)  Body mass index is 24.41 kg/m².  Vital Signs (Most Recent):  Temp: 98.3 °F (36.8 °C) (11/24/24 0430)  Pulse: 90 (11/24/24 0617)  Resp: 18 (11/24/24 0617)  BP: (!) 167/86 (MD Daniel notified.) (11/24/24 0617)  SpO2: 95 % (11/24/24 0617) Vital Signs (24h Range):  Temp:  [97 °F (36.1 °C)-98.3 °F (36.8 °C)] 98.3 °F (36.8 °C)  Pulse:  [] 90  Resp:  [16-20] 18  SpO2:  [95 %-99 %] 95 %  BP: (114-171)/(76-86) 167/86                         Male External Urinary Catheter 11/24/24 0144 Large (Active)          Physical Exam           Neurosurgery Physical Exam    General: well developed, well nourished, no distress.   HEENT: normocephalic, atraumatic  CV: regular rate   Pulmonary: normal respirations, no signs of respiratory distress  Abdomen: soft, non-distended, not tender to palpation  Skin: Skin is warm, dry and intact  Heme: no bruising    Neuro:   Mental Status: AO x4, no aphasia, no dysarthria   CN: PERRL, EOMI, VF intact to confrontation, sensation intact bilaterally, eyebrow raise and grimace symmetric, tongue midline  Motor: moves all extremities spontaneously, full strength throughout, no pronator drift   Sensory: intact to light touch throughout  Cerebellar: finger to nose intact bilaterally  Reflexes: -Madison's, -Babinski, no clonus. Patellar: 2+ bilaterally       Significant Labs:  Recent Labs   Lab 11/24/24  0055 11/24/24  0343   GLU 99 102    140   K 4.7 4.4    108   CO2 12* 15*   BUN 17 16   CREATININE 1.5* 1.1    CALCIUM 9.3 8.6*     Recent Labs   Lab 11/24/24  0055   WBC 16.67*   HGB 13.7*   HCT 40.1        Recent Labs   Lab 11/24/24  0440   INR 1.0   APTT <21.0     Microbiology Results (last 7 days)       ** No results found for the last 168 hours. **          All pertinent labs from the last 24 hours have been reviewed.    Significant Diagnostics:  I have reviewed all pertinent imaging results/findings within the past 24 hours.

## 2024-11-24 NOTE — PROVIDER PROGRESS NOTES - EMERGENCY DEPT.
Encounter Date: 11/24/2024    ED Physician Progress Notes          Patient signed out to me pending CPK, lactate and repeat head CT 4 hours after initial CT.  Neurosurgery will be made aware once results of repeat head CT returned.  Patient was brought in for evaluation of acute psychosis.  Patient was found naked on the street after which he was tased.  Patient placed on pec.  Given Ativan with no improvement of psychosis, then controlled by Haldol.  Given 2 doses of Haldol.  Patient sustained fall after being tased during which she received head injury.  Head CT shows small traumatic subdural hemorrhage.  Repeat of head CT shows stable small traumatic subdural hemorrhage.  Neurosurgery made aware.  Toxicology consulted given concern for toxic alcohol syndrome.  Lactate was initially elevated but has since cleared.  CPK is elevated and is up trending.  Patient admitted for further management given elevated CPK.

## 2024-11-24 NOTE — SUBJECTIVE & OBJECTIVE
History reviewed. No pertinent past medical history.    History reviewed. No pertinent surgical history.    Review of patient's allergies indicates:  No Known Allergies    No current facility-administered medications on file prior to encounter.     No current outpatient medications on file prior to encounter.     Family History    None       Tobacco Use    Smoking status: Unknown    Smokeless tobacco: Not on file   Substance and Sexual Activity    Alcohol use: Not on file    Drug use: Not on file    Sexual activity: Not on file     Review of Systems   Constitutional: Negative.  Negative for chills, diaphoresis, fatigue and fever.   HENT: Negative.  Negative for congestion, ear pain, hearing loss, rhinorrhea and sore throat.    Eyes: Negative.    Respiratory:  Positive for shortness of breath. Negative for cough and chest tightness.    Cardiovascular: Negative.  Negative for chest pain and palpitations.   Gastrointestinal: Negative.  Negative for abdominal distention, abdominal pain, blood in stool, constipation, diarrhea, nausea and vomiting.   Genitourinary: Negative.  Negative for dysuria, frequency and hematuria.   Musculoskeletal: Negative.  Negative for back pain, joint swelling and neck pain.   Skin: Negative.    Neurological: Negative.  Negative for dizziness, seizures, syncope, weakness, light-headedness and headaches.   Psychiatric/Behavioral: Negative.       Objective:     Vital Signs (Most Recent):  Temp: 98.3 °F (36.8 °C) (11/24/24 0430)  Pulse: 91 (11/24/24 1001)  Resp: 17 (11/24/24 1001)  BP: (!) 154/87 (11/24/24 1001)  SpO2: 100 % (11/24/24 1001) Vital Signs (24h Range):  Temp:  [97 °F (36.1 °C)-98.3 °F (36.8 °C)] 98.3 °F (36.8 °C)  Pulse:  [] 91  Resp:  [16-20] 17  SpO2:  [91 %-100 %] 100 %  BP: (114-171)/(70-94) 154/87     Weight: 81.6 kg (180 lb)  Body mass index is 24.41 kg/m².     Physical Exam  Constitutional:       General: He is not in acute distress.     Appearance: Normal appearance.  He is not ill-appearing.   HENT:      Head: Normocephalic.      Nose: Nose normal.   Eyes:      Extraocular Movements: Extraocular movements intact.   Cardiovascular:      Rate and Rhythm: Normal rate and regular rhythm.      Pulses: Normal pulses.      Heart sounds: Normal heart sounds. No murmur heard.     No friction rub. No gallop.   Pulmonary:      Effort: Pulmonary effort is normal. No respiratory distress.      Breath sounds: Normal breath sounds. No wheezing or rales.   Abdominal:      General: Bowel sounds are normal. There is no distension.      Palpations: Abdomen is soft.      Tenderness: There is no abdominal tenderness. There is no guarding.   Musculoskeletal:         General: No swelling. Normal range of motion.      Cervical back: Normal range of motion. No rigidity or tenderness.   Skin:     General: Skin is warm.   Neurological:      General: No focal deficit present.      Mental Status: He is alert and oriented to person, place, and time.   Psychiatric:         Mood and Affect: Mood normal.         Behavior: Behavior normal.                Significant Labs: All pertinent labs within the past 24 hours have been reviewed.  CBC:   Recent Labs   Lab 11/24/24  0055   WBC 16.67*   HGB 13.7*   HCT 40.1        CMP:   Recent Labs   Lab 11/24/24  0055 11/24/24  0343    140   K 4.7 4.4    108   CO2 12* 15*   GLU 99 102   BUN 17 16   CREATININE 1.5* 1.1   CALCIUM 9.3 8.6*   PROT 8.1  --    ALBUMIN 4.3  --    BILITOT 0.5  --    ALKPHOS 105  --    AST 49*  --    ALT 28  --    ANIONGAP 27* 17*       Significant Imaging: I have reviewed all pertinent imaging results/findings within the past 24 hours.

## 2024-11-25 VITALS
HEART RATE: 89 BPM | HEIGHT: 72 IN | DIASTOLIC BLOOD PRESSURE: 86 MMHG | SYSTOLIC BLOOD PRESSURE: 147 MMHG | WEIGHT: 179.88 LBS | BODY MASS INDEX: 24.36 KG/M2 | TEMPERATURE: 99 F | OXYGEN SATURATION: 97 % | RESPIRATION RATE: 18 BRPM

## 2024-11-25 LAB
ALBUMIN SERPL BCP-MCNC: 3.5 G/DL (ref 3.5–5.2)
ALP SERPL-CCNC: 90 U/L (ref 40–150)
ALT SERPL W/O P-5'-P-CCNC: 25 U/L (ref 10–44)
ANION GAP SERPL CALC-SCNC: 9 MMOL/L (ref 8–16)
ANION GAP SERPL CALC-SCNC: 9 MMOL/L (ref 8–16)
AST SERPL-CCNC: 49 U/L (ref 10–40)
BACTERIA UR CULT: NORMAL
BACTERIA UR CULT: NORMAL
BASOPHILS # BLD AUTO: 0.03 K/UL (ref 0–0.2)
BASOPHILS NFR BLD: 0.3 % (ref 0–1.9)
BILIRUB SERPL-MCNC: 0.7 MG/DL (ref 0.1–1)
BUN SERPL-MCNC: 13 MG/DL (ref 8–23)
BUN SERPL-MCNC: 13 MG/DL (ref 8–23)
CALCIUM SERPL-MCNC: 9 MG/DL (ref 8.7–10.5)
CALCIUM SERPL-MCNC: 9.3 MG/DL (ref 8.7–10.5)
CHLORIDE SERPL-SCNC: 105 MMOL/L (ref 95–110)
CHLORIDE SERPL-SCNC: 106 MMOL/L (ref 95–110)
CK SERPL-CCNC: 1642 U/L (ref 20–200)
CK SERPL-CCNC: 1691 U/L (ref 20–200)
CO2 SERPL-SCNC: 24 MMOL/L (ref 23–29)
CO2 SERPL-SCNC: 27 MMOL/L (ref 23–29)
CREAT SERPL-MCNC: 1 MG/DL (ref 0.5–1.4)
CREAT SERPL-MCNC: 1.1 MG/DL (ref 0.5–1.4)
DIFFERENTIAL METHOD BLD: ABNORMAL
EOSINOPHIL # BLD AUTO: 0.1 K/UL (ref 0–0.5)
EOSINOPHIL NFR BLD: 0.5 % (ref 0–8)
ERYTHROCYTE [DISTWIDTH] IN BLOOD BY AUTOMATED COUNT: 11.2 % (ref 11.5–14.5)
EST. GFR  (NO RACE VARIABLE): >60 ML/MIN/1.73 M^2
EST. GFR  (NO RACE VARIABLE): >60 ML/MIN/1.73 M^2
GLUCOSE SERPL-MCNC: 146 MG/DL (ref 70–110)
GLUCOSE SERPL-MCNC: 96 MG/DL (ref 70–110)
HCT VFR BLD AUTO: 37.1 % (ref 40–54)
HCV RNA SERPL QL NAA+PROBE: NOT DETECTED
HCV RNA SPEC NAA+PROBE-ACNC: NOT DETECTED IU/ML
HGB BLD-MCNC: 13 G/DL (ref 14–18)
IMM GRANULOCYTES # BLD AUTO: 0.04 K/UL (ref 0–0.04)
IMM GRANULOCYTES NFR BLD AUTO: 0.4 % (ref 0–0.5)
LYMPHOCYTES # BLD AUTO: 1.2 K/UL (ref 1–4.8)
LYMPHOCYTES NFR BLD: 12.4 % (ref 18–48)
MAGNESIUM SERPL-MCNC: 2.1 MG/DL (ref 1.6–2.6)
MCH RBC QN AUTO: 31.6 PG (ref 27–31)
MCHC RBC AUTO-ENTMCNC: 35 G/DL (ref 32–36)
MCV RBC AUTO: 90 FL (ref 82–98)
MONOCYTES # BLD AUTO: 1.3 K/UL (ref 0.3–1)
MONOCYTES NFR BLD: 14.4 % (ref 4–15)
NEUTROPHILS # BLD AUTO: 6.7 K/UL (ref 1.8–7.7)
NEUTROPHILS NFR BLD: 72 % (ref 38–73)
NRBC BLD-RTO: 0 /100 WBC
OHS QRS DURATION: 90 MS
OHS QRS DURATION: 90 MS
OHS QTC CALCULATION: 469 MS
OHS QTC CALCULATION: 481 MS
PHOSPHATE SERPL-MCNC: 1.5 MG/DL (ref 2.7–4.5)
PHOSPHATE SERPL-MCNC: 2.9 MG/DL (ref 2.7–4.5)
PLATELET # BLD AUTO: 176 K/UL (ref 150–450)
PMV BLD AUTO: 10.1 FL (ref 9.2–12.9)
POTASSIUM SERPL-SCNC: 3.8 MMOL/L (ref 3.5–5.1)
POTASSIUM SERPL-SCNC: 4.2 MMOL/L (ref 3.5–5.1)
PROT SERPL-MCNC: 6.7 G/DL (ref 6–8.4)
RBC # BLD AUTO: 4.11 M/UL (ref 4.6–6.2)
SODIUM SERPL-SCNC: 139 MMOL/L (ref 136–145)
SODIUM SERPL-SCNC: 141 MMOL/L (ref 136–145)
WBC # BLD AUTO: 9.31 K/UL (ref 3.9–12.7)

## 2024-11-25 PROCEDURE — 36415 COLL VENOUS BLD VENIPUNCTURE: CPT

## 2024-11-25 PROCEDURE — 80053 COMPREHEN METABOLIC PANEL: CPT

## 2024-11-25 PROCEDURE — 80048 BASIC METABOLIC PNL TOTAL CA: CPT | Mod: XB

## 2024-11-25 PROCEDURE — 82550 ASSAY OF CK (CPK): CPT | Mod: 91

## 2024-11-25 PROCEDURE — 84100 ASSAY OF PHOSPHORUS: CPT

## 2024-11-25 PROCEDURE — 82550 ASSAY OF CK (CPK): CPT

## 2024-11-25 PROCEDURE — 83735 ASSAY OF MAGNESIUM: CPT

## 2024-11-25 PROCEDURE — 85025 COMPLETE CBC W/AUTO DIFF WBC: CPT

## 2024-11-25 PROCEDURE — 87522 HEPATITIS C REVRS TRNSCRPJ: CPT | Performed by: EMERGENCY MEDICINE

## 2024-11-25 PROCEDURE — 25000003 PHARM REV CODE 250

## 2024-11-25 RX ORDER — AMLODIPINE BESYLATE 5 MG/1
5 TABLET ORAL DAILY
Qty: 90 TABLET | Refills: 3 | Status: SHIPPED | OUTPATIENT
Start: 2024-11-26 | End: 2025-11-26

## 2024-11-25 RX ORDER — SODIUM,POTASSIUM PHOSPHATES 280-250MG
2 POWDER IN PACKET (EA) ORAL
Status: DISCONTINUED | OUTPATIENT
Start: 2024-11-25 | End: 2024-11-25 | Stop reason: HOSPADM

## 2024-11-25 RX ADMIN — Medication 300 ML: at 10:11

## 2024-11-25 RX ADMIN — Medication 300 ML: at 04:11

## 2024-11-25 RX ADMIN — AMLODIPINE BESYLATE 5 MG: 5 TABLET ORAL at 08:11

## 2024-11-25 RX ADMIN — Medication 300 ML: at 08:11

## 2024-11-25 RX ADMIN — POTASSIUM & SODIUM PHOSPHATES POWDER PACK 280-160-250 MG 2 PACKET: 280-160-250 PACK at 08:11

## 2024-11-25 RX ADMIN — POTASSIUM & SODIUM PHOSPHATES POWDER PACK 280-160-250 MG 2 PACKET: 280-160-250 PACK at 04:11

## 2024-11-25 RX ADMIN — POTASSIUM & SODIUM PHOSPHATES POWDER PACK 280-160-250 MG 2 PACKET: 280-160-250 PACK at 10:11

## 2024-11-25 NOTE — PLAN OF CARE
Problem: Violence Risk or Actual  Goal: Anger and Impulse Control  Outcome: Progressing     Problem: Adult Inpatient Plan of Care  Goal: Plan of Care Review  Outcome: Progressing  Goal: Patient-Specific Goal (Individualized)  Outcome: Progressing  Goal: Absence of Hospital-Acquired Illness or Injury  Outcome: Progressing  Goal: Optimal Comfort and Wellbeing  Outcome: Progressing  Goal: Readiness for Transition of Care  Outcome: Progressing     Problem: Acute Kidney Injury/Impairment  Goal: Fluid and Electrolyte Balance  Outcome: Progressing  Goal: Improved Oral Intake  Outcome: Progressing  Goal: Effective Renal Function  Outcome: Progressing     Problem: Wound  Goal: Optimal Coping  Outcome: Progressing  Goal: Optimal Functional Ability  Outcome: Progressing  Goal: Absence of Infection Signs and Symptoms  Outcome: Progressing  Goal: Improved Oral Intake  Outcome: Progressing  Goal: Optimal Pain Control and Function  Outcome: Progressing  Goal: Skin Health and Integrity  Outcome: Progressing  Goal: Optimal Wound Healing  Outcome: Progressing

## 2024-11-25 NOTE — DISCHARGE SUMMARY
[facility]  Discharge Summary      Patient Name: Polo Small  MRN: [id number]  EFRAIN: [id number]  Patient Class: IP- Inpatient  Admission Date: 11/24/2024  Hospital Length of Stay: 1 days  Discharge Date and Time:  11/25/2024 2:35 PM  Attending Physician: Chelsea Zarco MD   Discharging Provider: Amber Guajardo MD  Primary Care Provider: No primary care provider on file.  [place] [facility] Amber Guajardo MD  Primary Care Team: [facility] 3    HPI:   Polo Small is a 64 y.o. male with a hx of HTN and asthma presenting to the ED for altered mental status 2/2 drug induced psychosis. Patient says that he was hanging out with some friends, partying and drinking. Thought he saw and felt a snake go up his pants so he decided to take off all of his clothing to find and remove the snake. Snake was not present. Found by police running in the streets naked, inebriated, not following commands, and running from the officers. Was tased and brought in by police. Per ED staff, patient was repeating hyper Orthodoxy phrases and having auditory hallucinations. On assessment, patient is calm, relaxed, and follows commands. Complains that he is slightly short of breath requiring oxygenation. Denies any pain, chest pain or pressure, or any other physical problems.    ED course: Drug screen positive for cocaine, alcohol intake was high, elevated CPK of 908 resulted in patient getting 3L of normal saline. Lactate on admission was 5.32 with a repeat of 1.54. CT head showed small inferior right frontal lobe hemorrhagic contusions. Was given lorazepam and haldol for agitation. Due to hypertensive readings, patient was placed on nicardipine drip for about 30 minutes and given two doses of labetalol 10mg to stabilize BP.    * No surgery found *      Hospital Course:   Patient was admitted for altered mental status secondary to drug induced psychotic disorder. Patient was given several  liters of IV fluids for abnormal CK levels. Started on nicardipine gtt for 30 minutes due to elevated blood pressures. Received two doses of labetalol 10mg IV for hypertension. Phosphorus replaced. Started on amlodipine 5mg to aid in HTN. Encouraging patient to follow up with a PCP [place] [facility] and then Infectious Disease.     Goals of Care Treatment Preferences:  Code Status: Full Code         Consults:   Consults (From admission, onward)          Status Ordering Provider     Inpatient consult to Psychiatry  Once        Provider:  (Not yet assigned)    Completed ELVA BAÑUELOS     Inpatient Consult to Medical Toxicology  Once        Provider:  (Not yet assigned)    Completed KEVIN MIDDLETON     Inpatient consult to Neurosurgery  Once        Provider:  (Not yet assigned)    Completed USMAN HUNTER            Psychiatric  * Drug-induced psychotic disorder  - Patient found running in the streets naked and having hallucinations  - may be drug induced as cocaine and alcohol was found in patient's system  - PEC'ed on admission  - psych consulted; thanks for the assist    - ok to d/c PEC order      Pulmonary  Asthma  - patient complains of SOB   - started on 2L O2; no longer requiring oxygenation supplementation  - albuterol prn      Cardiac/Vascular  HTN (hypertension)  Patients blood pressure range in the last 24 hours was: BP  Min: 150/71  Max: 188/93.The patient's inpatient anti-hypertensive regimen is listed below:  Current Antihypertensives  hydrALAZINE tablet 25 mg, Daily PRN, Oral  amLODIPine tablet 5 mg, Daily, Oral  labetalol 20 mg/4 mL (5 mg/mL) IV syring, Every 4 hours PRN, Intravenous  amlodipine (NORVASC) tablet, Daily, Oral    Plan  - BP is controlled, no changes needed to their regimen  - started on nicardipine drip in the ED for about 30 minutes before it was discontinued  - received two doses of labetalol 10mg   - will consider starting BP medication regimen    - starting amlodipine  5mg    Other  Elevated creatine kinase level  - elevated CK on admission of 908, trini to 1035  - given 3L of NS and 1L of LR  - likely alcohol and cocaine induced as well as being tased in the back  - continue to monitor and hydrate as needed  - CK beginning to plateau         Final Active Diagnoses:    Diagnosis Date Noted POA    PRINCIPAL PROBLEM:  Drug-induced psychotic disorder [F19.959] 11/24/2024 Yes    Subarachnoid hemorrhage [I60.9] 11/24/2024 Yes    Asthma [J45.909] 11/24/2024 Yes    HTN (hypertension) [I10] 11/24/2024 Yes    Elevated creatine kinase level [R74.8] 11/24/2024 Yes    WEI (acute kidney injury) [N17.9] 11/24/2024 Yes    Polysubstance use disorder [F19.90] 11/24/2024 Yes    High anion gap metabolic acidosis [E87.29] 11/24/2024 Yes      Problems Resolved During this Admission:    Diagnosis Date Noted Date Resolved POA    COPD exacerbation [J44.1] 11/24/2024 11/24/2024 Unknown       Discharged Condition: good    Disposition: Home or Self Care    Follow Up:    Patient Instructions:      Ambulatory referral/consult to Infectious Disease   Standing Status: Future   Referral Priority: Routine Referral Type: Consultation   Referral Reason: Specialty Services Required   Requested Specialty: Infectious Diseases   Number of Visits Requested: 1       Significant Diagnostic Studies: Labs: CMP   Recent Labs   Lab 11/24/24  0055 11/24/24  0343 11/24/24  1023 11/25/24  0515    140 140 139   K 4.7 4.4 4.4 3.8    108 105 106   CO2 12* 15* 23 24   GLU 99 102 215* 96   BUN 17 16 16 13   CREATININE 1.5* 1.1 1.0 1.0   CALCIUM 9.3 8.6* 8.5* 9.0   PROT 8.1  --   --  6.7   ALBUMIN 4.3  --   --  3.5   BILITOT 0.5  --   --  0.7   ALKPHOS 105  --   --  90   AST 49*  --   --  49*   ALT 28  --   --  25   ANIONGAP 27* 17* 12 9   , CBC   Recent Labs   Lab 11/24/24  0055 11/25/24  0515   WBC 16.67* 9.31   HGB 13.7* 13.0*   HCT 40.1 37.1*    176   , Lipid Panel   Lab Results   Component Value Date    CHOL 113  (L) 11/24/2024    HDL 57 11/24/2024    LDLCALC 45.6 (L) 11/24/2024    TRIG 52 11/24/2024    CHOLHDL 50.4 (H) 11/24/2024   , and A1C:   Recent Labs   Lab 11/24/24  0055   HGBA1C 5.0       Pending Diagnostic Studies:       Procedure Component Value Units Date/Time    Basic metabolic panel [[id number]]     Order Status: Sent Lab Status: No result     Specimen: Blood     Beta - Hydroxybutyrate, Serum [[id number]] Collected: 11/24/24 0335    Order Status: Sent Lab Status: No result     Specimen: Blood     Phosphorus [[id number]]     Order Status: Sent Lab Status: No result     Specimen: Blood     Urinalysis, Reflex to Urine Culture Urine, Clean Catch [[id number]] Collected: 11/24/24 0334    Order Status: Sent Lab Status: No result     Specimen: Urine            Medications:  Reconciled Home Medications:      Medication List        START taking these medications      amLODIPine 5 MG tablet  Commonly known as: NORVASC  Take 1 tablet (5 mg total) by mouth once daily.  Start taking on: November 26, 2024              Indwelling Lines/Drains at time of discharge:   Lines/Drains/Airways       None                   Time spent on the discharge of patient: 5 minutes         Amber Guajardo MD  PGY-1  Department of Hospital Medicine  [facility]

## 2024-11-25 NOTE — PROGRESS NOTES
[facility]  Progress Note  11/25/2024  10:15 AM    RECOMMENDATIONS:    AGAP metabolic acidosis  Patient improved  Admits to ethanol use  Most likely related to dehydration, AKA  Symptomatic and supportive care - patient tolerating po  CIWA q4h  Remainder of care as per primary team.  We will sign off    ----------------------------------------------------------------------------------------------------------------------------------------------------------------------------------------------------------------------------------------    Overnight events and hospital course:   Polo Small was admitted with AMS, bizarre statements, admitted to etoh and cocaine. Improved today. Tolerating po. Has no complaints - states he feels much better.    REVIEW OF SYSTEMS:    All systems reviewed and negative except as noted in HPI    PHYSICAL EXAM:    General: Alert and oriented to all spheres, not in acute distress. Well nourished. Appears of stated age.  Head: Normocephalic, laceration/abrasion/contusion to forehead  HEENT: Pupils are round and equal, appropriate size for room, appropriately reactive. EOMI. Normal phonation.  Neck: Supple.  Cardiovascular: Regular rate and rhythm. No m/r/g. 2+ distal pulses. No edema.  Pulm/Chest: Respirations are even and unlabored.  Abdomen:Nondistended.   MSK:Moving all extremities appropriately.  Neuro: Intact symmetric sensation and strength. No appreciable clonus.  Skin: no bullae, petechiae, or purpura.   Psych: normal mood and affect. Calm and cooperative.    Results for orders placed or performed during the hospital encounter of 11/24/24   Repeat EKG 12-lead   Result Value Ref Range    QRS Duration 90 ms    OHS QTC Calculation 469 ms    Narrative    Test Reason : R41.82,    Vent. Rate :  78 BPM     Atrial Rate :  78 BPM     P-R Int : 158 ms          QRS Dur :  90 ms      QT Int : 412 ms       P-R-T Axes :  64  52  58 degrees    QTcB Int : 469 ms    Normal sinus  rhythm  Normal ECG  No previous ECGs available  Confirmed by Natanael Cruz ([id number]) on 11/25/2024 8:48:24 AM    Referred By: AAAREFERRAL SELF           Confirmed By: Natanael Cruz        RELEVANT LABS / IMAGING    - reviewed   AGAP closed  Cocaine pos on UDS    Imaging Results              CT Cervical Spine Without Contrast (Final result)  Result time 11/24/24 15:59:44      Final result by Luis Alfredo Moreno MD (11/24/24 15:59:44)                   Impression:      1. Allowing for motion artifact, no convincing acute displaced fracture or dislocation of the cervical spine noting degenerative changes and additional findings above.      Electronically signed by: Luis Alfredo Moreno MD  Date:    11/24/2024  Time:    15:59               Narrative:    EXAMINATION:  CT CERVICAL SPINE WITHOUT CONTRAST    CLINICAL HISTORY:  Neck trauma, midline tenderness (Age 16-64y);    TECHNIQUE:  Low dose axial images, sagittal and coronal reformations were performed though the cervical spine.  Contrast was not administered.    COMPARISON:  None    FINDINGS:  There is osteopenia.  There is motion artifact.    The visualized portions of the lung apices are unremarkable.  The thyroid gland, parotid glands, and remaining visualized salivary glands are grossly unremarkable.  No tonsillar enlargement.  No significant cervical lymphadenopathy.  The paraspinous and hypopharyngeal soft tissues are unremarkable.    Sagittal reformatted imaging demonstrates adequate alignment of the cervical spine.  There is multilevel disc space height loss and endplate degenerative change.  There is prominent anterior marginal osteophytosis.  There is facet arthropathy.  No convincing acute displaced fracture or dislocation of the cervical spine.  Motion artifact limits evaluation for spondylitic changes.  There is multilevel moderate spinal canal stenosis and neuroforaminal narrowing.  The airway is patent.                                       CT Head Without  Contrast (Final result)  Result time 11/24/24 08:41:54      Final result by Christiano Barrett MD (11/24/24 08:41:54)                   Impression:      Stable RIGHT frontal hemorrhagic contusion without detrimental change.  No evidence for mass effect or midline shift.      Electronically signed by: Christiano Barrett MD  Date:    11/24/2024  Time:    08:41               Narrative:    EXAMINATION:  CT HEAD WITHOUT CONTRAST    CLINICAL HISTORY:  Subarachnoid hemorrhage (SAH) suspected;    TECHNIQUE:  Low dose axial CT images obtained throughout the head without intravenous contrast. Sagittal and coronal reconstructions were performed.    COMPARISON:  11/24/2024 03:45 hours.    FINDINGS:  Intracranial compartment:    Ventricles and sulci are normal in size for age without evidence of hydrocephalus. No extra-axial blood or fluid collections.    Hemorrhagic parenchymal contusion identified in the RIGHT frontal region, without significant interval detrimental change.  No parenchymal mass, interval development of edema or major vascular distribution infarct.    Skull/extracranial contents (limited evaluation): No fracture. Mastoid air cells are clear.Paranasal sinuses are essentially clear.  Soft tissue swelling RIGHT supraorbital region                                        CT Head Without Contrast (Final result)  Result time 11/24/24 04:21:55      Final result by Grover Bedoya MD (11/24/24 04:21:55)                   Impression:      Small inferior right frontal lobe hemorrhagic contusions.  No significant mass effect.  No midline shift.    Small right supraorbital hematoma.    This report was flagged in Epic as abnormal.    Findings above were relayed by Dejan Pate MD  by telephone to Yesenia Espinoza MD on 11/24/2024 at 04:00.    Electronically signed by resident: Dejan Pate  Date:    11/24/2024  Time:    04:00    Electronically signed by: Grover Bedoya MD  Date:    11/24/2024  Time:    04:21                Narrative:    EXAMINATION:  CT HEAD WITHOUT CONTRAST    CLINICAL HISTORY:  Mental status change, unknown cause;    TECHNIQUE:  Low dose axial CT images obtained throughout the head without the use of intravenous contrast.  Axial, sagittal and coronal reconstructions were performed.    COMPARISON:  None.    FINDINGS:  Small foci of hyperattenuation in the inferior right frontal lobe (for example axial 2:12, 17), compatible with hemorrhagic contusions.  Probable associated trace subarachnoid blood products.  No significant mass effect.  No midline shift    Ventricles and sulci are normal in size without evidence of hydrocephalus.    Partial empty sella.    Small right supraorbital soft tissue induration and swelling.  No displaced calvarial fracture.  Mastoid air cells and paranasal sinuses are essentially clear.                                    EKG independently interpreted by me.    Performed: 11/25/2024   Rate/Rhythm/Axis: 86 bpm, sinus rhythm, nml axis  QRS 90 ms  Qtc 481 ms  Impression:  Normal Sinus Rhythm.  EKG without evidence of Na channel blockade, though the Qtc is slightly prolonged      ASSESSMENT:     64 y.o. male progressing as expected with a presumed ethanol / cocaine intoxication with resultant acidosis unlikely related to toxic alcohol  Improved  Tolerating po  No further toxicologic interventions    RECOMMENDATIONS:    SEE ABOVE      I spent 25 minutes in review of the case and relevant documentation, lab work and imaging.  I discussed the case with the primary team.    Please contact on-call medical  with questions.    Dave Jay DO  [facility]  Spectra [phone]     Please note: this is a Medical Toxicology Consultation note, not an Emergency Department note.

## 2024-11-25 NOTE — PLAN OF CARE
SIDDHARTHAW scheduled new patient hospital follow up visit with [facility] with Debra Randhawa NP on 12/31/24 @ 8:30 am.     Referral has been sent to [facility] for appointment with Infectious Disease.     [facility] does not accept patients insurance.

## 2024-11-25 NOTE — ASSESSMENT & PLAN NOTE
Patients blood pressure range in the last 24 hours was: BP  Min: 150/71  Max: 188/93.The patient's inpatient anti-hypertensive regimen is listed below:  Current Antihypertensives  hydrALAZINE tablet 25 mg, Daily PRN, Oral  amLODIPine tablet 5 mg, Daily, Oral  labetalol 20 mg/4 mL (5 mg/mL) IV syring, Every 4 hours PRN, Intravenous  amlodipine (NORVASC) tablet, Daily, Oral    Plan  - BP is controlled, no changes needed to their regimen  - started on nicardipine drip in the ED for about 30 minutes before it was discontinued  - received two doses of labetalol 10mg   - will consider starting BP medication regimen    - starting amlodipine 5mg

## 2024-11-25 NOTE — ED NOTES
Telemetry Verification   Patient placed on Telemetry Box  Verified with War Room  Box # [id number]   Monitor Tech airstrip   Rate 87   Rhythm NSR

## 2024-11-25 NOTE — ASSESSMENT & PLAN NOTE
- patient complains of SOB   - started on 2L O2; no longer requiring oxygenation supplementation  - albuterol prn

## 2024-11-25 NOTE — PLAN OF CARE
Problem: Violence Risk or Actual  Goal: Anger and Impulse Control  Outcome: Met     Problem: Adult Inpatient Plan of Care  Goal: Plan of Care Review  Outcome: Met  Goal: Patient-Specific Goal (Individualized)  Outcome: Met  Goal: Absence of Hospital-Acquired Illness or Injury  Outcome: Met  Goal: Optimal Comfort and Wellbeing  Outcome: Met  Goal: Readiness for Transition of Care  Outcome: Met     Problem: Acute Kidney Injury/Impairment  Goal: Fluid and Electrolyte Balance  Outcome: Met  Goal: Improved Oral Intake  Outcome: Met  Goal: Effective Renal Function  Outcome: Met     Problem: Wound  Goal: Optimal Coping  Outcome: Met  Goal: Optimal Functional Ability  Outcome: Met  Goal: Absence of Infection Signs and Symptoms  Outcome: Met  Goal: Improved Oral Intake  Outcome: Met  Goal: Optimal Pain Control and Function  Outcome: Met  Goal: Skin Health and Integrity  Outcome: Met  Goal: Optimal Wound Healing  Outcome: Met

## 2024-11-25 NOTE — ASSESSMENT & PLAN NOTE
- elevated CK on admission of 908, trini to 1035  - given 3L of NS and 1L of LR  - likely alcohol and cocaine induced as well as being tased in the back  - continue to monitor and hydrate as needed  - CK beginning to plateau

## 2024-11-25 NOTE — PLAN OF CARE
SW met with pt at bedside after reviewing pt chart. Pt was sitting up A/Ox 4 able to follow commands. Pt endorsed actively drinking excessively and moderate cocaine use. Pt declined [facility] resources. Pt reports that he got a job at [employer] but ended up drinking too much and can't start work today. 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 Pt also plans to apply for early social security correction.  Pt will need transport at discharge. SW also requested PCP and  ID follow-up appointments.      Gracie Arnett MSW  Care Management

## 2024-11-25 NOTE — HOSPITAL COURSE
Patient was admitted for altered mental status secondary to drug induced psychotic disorder. Patient was given several liters of IV fluids for abnormal CK levels. Started on nicardipine gtt for 30 minutes due to elevated blood pressures. Received two doses of labetalol 10mg IV for hypertension. Phosphorus replaced. Started on amlodipine 5mg to aid in HTN. Encouraging patient to [place] up with [place] [facility] [place]ious [place].

## 2024-11-29 LAB
BACTERIA BLD CULT: NORMAL
BACTERIA BLD CULT: NORMAL

## 2024-12-30 ENCOUNTER — HOSPITAL ENCOUNTER (EMERGENCY)
Facility: HOSPITAL | Age: 64
Discharge: HOME | End: 2024-12-31
Attending: EMERGENCY MEDICINE
Payer: COMMERCIAL

## 2024-12-30 DIAGNOSIS — M25.562 KNEE PAIN, LEFT: ICD-10-CM

## 2024-12-30 DIAGNOSIS — M71.22 SYNOVIAL CYST OF LEFT POPLITEAL SPACE: Primary | ICD-10-CM

## 2024-12-30 PROCEDURE — 99284 EMERGENCY DEPT VISIT MOD MDM: CPT | Mod: 25

## 2024-12-30 NOTE — FIRST PROVIDER EVALUATION
Medical screening examination initiated.  I have conducted a focused provider triage encounter, findings are as follows:    Brief history of present illness:  left knee swelling x 3-4 weeks    There were no vitals filed for this visit.    Pertinent physical exam:  ambulatory, stands easily    Brief workup plan:  intake    Preliminary workup initiated; this workup will be continued and followed by the physician or advanced practice provider that is assigned to the patient when roomed.

## 2024-12-31 VITALS
TEMPERATURE: 98 F | RESPIRATION RATE: 18 BRPM | OXYGEN SATURATION: 97 % | HEART RATE: 68 BPM | SYSTOLIC BLOOD PRESSURE: 162 MMHG | BODY MASS INDEX: 29.16 KG/M2 | DIASTOLIC BLOOD PRESSURE: 76 MMHG | WEIGHT: 220 LBS | HEIGHT: 73 IN

## 2024-12-31 RX ORDER — IBUPROFEN 600 MG/1
600 TABLET ORAL EVERY 6 HOURS PRN
Qty: 20 TABLET | Refills: 0 | Status: SHIPPED | OUTPATIENT
Start: 2024-12-31 | End: 2025-01-06

## 2024-12-31 NOTE — ED NOTES
Patient identifiers for Polo Small 64 y.o. male checked and correct.  Chief Complaint   Patient presents with    Knee Pain     Pt c/o left knee pain & swelling x few weeks.  Denies injury.     Past Medical History:   Diagnosis Date    Arthritis     Asthma     BPH (benign prostatic hyperplasia)     COPD (chronic obstructive pulmonary disease)     Depression     Hepatitis C     Hypertension     Varicose veins of both lower extremities      Allergies reported: Review of patient's allergies indicates:  No Known Allergies      LOC: Patient is awake, alert, and aware of environment with an appropriate affect. Patient is oriented x 4 and speaking appropriately.  APPEARANCE: Patient resting comfortably and in no acute distress. Patient is clean and well groomed, patient's clothing is properly fastened.  HEENT: - JVD, + midline trach  SKIN: The skin is warm and dry. Patient has normal skin turgor and moist mucus membranes.   MUSKULOSKELETAL: Patient is moving all extremities well, no obvious deformities noted. Pulses intact. Endorses 10/10 pain to L knee, no swelling or redness noted.  RESPIRATORY: Airway is open and patent. Respirations are spontaneous and non-labored with normal effort and rate.  CARDIAC:  No peripheral edema noted. + 2 bilateral pedal and radial pulses, < 3 s cap refill  ABDOMEN: No distention noted. Soft and non-tender upon palpation.  NEUROLOGICAL: pupils 4 mm, PERRL. Facial expression is symmetrical. Hand grasps are equal bilaterally. Normal sensation in all extremities when touched with finger.

## 2024-12-31 NOTE — ED PROVIDER NOTES
"Encounter Date: 12/30/2024       History     Chief Complaint   Patient presents with    Knee Pain     Pt c/o left knee pain & swelling x few weeks.  Denies injury.     Pt is a 65 yo M with PMH of COPD, Hep C, HTN, Depression, arthritis who presents with L knee pain that he reports started after getting tased by police about one month ago. He can bear weight but has increased posterior knee pain with full flexion of his knee. He notes a palpable mass when doing this. He has been taking Naproxen for pain.  No fever, no redness, no bruising  No history of knee surgery    The history is provided by the patient and medical records.     Review of patient's allergies indicates:  No Known Allergies  Past Medical History:   Diagnosis Date    Arthritis     Asthma     BPH (benign prostatic hyperplasia)     COPD (chronic obstructive pulmonary disease)     Depression     Hepatitis C     Hypertension     Varicose veins of both lower extremities      History reviewed. No pertinent surgical history.  Family History   Problem Relation Name Age of Onset    Cancer Mother      Heart disease Father       Social History     Tobacco Use    Smoking status: Every Day    Smokeless tobacco: Never   Substance Use Topics    Alcohol use: Yes     Comment: 3-4 beers every other day    Drug use: Not Currently     Types: "Crack" cocaine         Physical Exam     Initial Vitals [12/30/24 1746]   BP Pulse Resp Temp SpO2   (!) 179/79 91 16 98.1 °F (36.7 °C) 95 %      MAP       --         Physical Exam    Nursing note and vitals reviewed.  Constitutional: He appears well-developed and well-nourished. He is not diaphoretic. No distress.   HENT:   Head: Normocephalic and atraumatic.   Eyes: EOM are normal.   Neck: Neck supple.   Normal range of motion.  Cardiovascular:  Normal rate and regular rhythm.           Pulmonary/Chest: No respiratory distress.   Abdominal: He exhibits no distension.   Musculoskeletal:         General: Normal range of motion.      " Cervical back: Normal range of motion and neck supple.      Comments: No tenderness to L anterior knee  He has full ROM and can do a straight leg raise to left leg  Has a palpable mass to his posterior knee that feels like a cyst  No induration     Neurological: He is alert and oriented to person, place, and time. GCS score is 15. GCS eye subscore is 4. GCS verbal subscore is 5. GCS motor subscore is 6.   Skin: Skin is warm and dry.   Psychiatric: He has a normal mood and affect. His behavior is normal. Judgment and thought content normal.         ED Course   Procedures  Labs Reviewed - No data to display       Imaging Results              US Soft Tissue, Lower Extremity, Left (Final result)  Result time 12/31/24 02:25:15      Final result by Dominik Cruz DO (12/31/24 02:25:15)                   Impression:      Baker's cyst.      Electronically signed by: Dominik Cruz  Date:    12/31/2024  Time:    02:25               Narrative:    EXAMINATION:  US SOFT TISSUE, LOWER EXTREMITY, LEFT    CLINICAL HISTORY:  evaluate swelling behind L knee;    TECHNIQUE:  Ultrasound of the left lower extremity soft tissues was performed in the area of palpable abnormality as indicated by the patient.    COMPARISON:  None.    FINDINGS:  There is a Baker's cyst measuring 6.6 x 2.0 x 4.1 cm.                                       X-Ray Knee 3 View Left (Final result)  Result time 12/30/24 20:37:47      Final result by Terrance Strickland MD (12/30/24 20:37:47)                   Impression:      No acute displaced fracture-dislocation identified.      Electronically signed by: Terrance Strickland MD  Date:    12/30/2024  Time:    20:37               Narrative:    EXAMINATION:  XR KNEE 3 VIEW LEFT    CLINICAL HISTORY:  Pain in left knee    TECHNIQUE:  AP, lateral, and Merchant views of the left knee were performed.    COMPARISON:  None    FINDINGS:  Bones are well mineralized for age.  Overall alignment is within normal limits.  No displaced  fracture, dislocation or destructive osseous process.  No large suprapatellar joint effusion.  Minimal to mild tricompartmental degenerative change.  Prominent enthesophytes at the superior and inferior patellar poles and also tibial tubercle.  No subcutaneous emphysema or radiopaque foreign body.                                       Medications - No data to display  Medical Decision Making  DDX: fracture, dislocation, meniscus, ligament or tendon injury, Bakers cyst    US to eval for bakers cyst  X-ray without signs of fracture or dislocation  Ortho consult placed to both [facility] and [facility]  Pending US at change of shift  Placed a referral to orthopedics both [facility] and [facility]    Amount and/or Complexity of Data Reviewed  Radiology: ordered.    Risk  Prescription drug management.               ED Course as of 12/31/24 1823   Mon Dec 30, 2024   2127 Patient going for US now   [GK]      ED Course User Index  [GK] Dawna Peterson MD                           Clinical Impression:  Final diagnoses:  [M25.562] Knee pain, left  [M71.22] Synovial cyst of left popliteal space (Primary)          ED Disposition Condition    Discharge Stable          ED Prescriptions       Medication Sig Dispense Start Date End Date Auth. Provider    ibuprofen (ADVIL,MOTRIN) 600 MG tablet Take 1 tablet (600 mg total) by mouth every 6 (six) hours as needed for Pain. 20 tablet 12/31/2024 1/5/2025 Yesenia Espinoza MD          Follow-up Information       Follow up With Specialties Details Why Contact Info Additional Information    [facility] Surgical Oncology, Orthopedic Surgery, Genetics, Physical Medicine and Rehabilitation, Occupational Therapy, Radiology  [facility] orthopedics [address] [facility] [address]  [phone]       [facility] - Orthopedics [facility] Orthopedics  [facility] [address], [address] Floor  [facility] [address] [phone] [facility] - Main Building, 5th Floor  Please park in [facility] and take Atrium elevator             Dawna Peterson MD  12/31/24 3579

## 2024-12-31 NOTE — PROVIDER PROGRESS NOTES - EMERGENCY DEPT.
Encounter Date: 12/30/2024    ED Physician Progress Notes          Patient is a 64-year-old male with past medical history COPD, hep C, hypertension, arthritis that is presenting for left knee pain.  Patient states that he noticed a left knee pain after getting tased by the  1 month ago.  Patient continues to have posterior knee pain with full flexion his knee, noticed that he has a mass.  Patient otherwise denies any other symptoms.    Physical exam re-evaluation and well-appearing 64-year-old male, no distress, appropriately conversational.  Benign cardiac, respiratory, abdominal exam.  Palpable mass to posterior left knee.  Otherwise neurovascularly intact, 2+ pulses, 5/5 strength, no sensation changes as per patient.  No signs of trauma, range of motion intact to left knee.    Plan: Patient was handed off to me by previous physician.  Consider possible Baker's cyst.  Pending ultrasound read.  Patient handed off to the night team pending ultrasound read.  Patient agrees with treatment and plan.

## 2024-12-31 NOTE — PLAN OF CARE
LORAINE faxed an Ambulatory Referral/Consult to orthopedics @[facility]. Fax#[phone].    NAVEEN Calderon, MSW-LMSW  Medical Social Worker/  ER Department

## 2024-12-31 NOTE — ED TRIAGE NOTES
65 y/o M presents to ER with c/c L knee pain rated 10/10 with swelling x 3 weeks. Denies trauma. States that it goes down when he takes his aleve but swells up without.

## 2024-12-31 NOTE — PROVIDER PROGRESS NOTES - EMERGENCY DEPT.
Signout Note    I received signout from the previous provider.     Chief complaint:  Knee Pain (Pt c/o left knee pain & swelling x few weeks.  Denies injury.)      Pertinent history &exam:  Polo Small is a 64 y.o. male who presented to emergency department with complaint of atraumatic left knee pain and swelling.  Exam consistent with Baker cyst.  Signed out pending ultrasound for final disposition    Vitals:    12/31/24 0245   BP: (!) 162/76   Pulse: 68   Resp: 18   Temp: 98.3 °F (36.8 °C)       Imaging Studies:    US Soft Tissue, Lower Extremity, Left   Final Result      Baker's cyst.         Electronically signed by: Dominik Cruz   Date:    12/31/2024   Time:    02:25      X-Ray Knee 3 View Left   Final Result      No acute displaced fracture-dislocation identified.         Electronically signed by: Terrance Strickland MD   Date:    12/30/2024   Time:    20:37          Medications Given:  Medications - No data to display    Pending Items/ MDM:  64 y.o. male with above complaint.  Ultrasound demonstrating Garvey's cyst.  Will prescribe NSAIDs and give Ace wrap.  Recommend ortho surgery follow up.  Discharged in stable condition.    Diagnostic Impression:    1. Synovial cyst of left popliteal space    2. Knee pain, left         ED Disposition Condition    Discharge Stable          ED Prescriptions       Medication Sig Dispense Start Date End Date Auth. Provider    ibuprofen (ADVIL,MOTRIN) 600 MG tablet Take 1 tablet (600 mg total) by mouth every 6 (six) hours as needed for Pain. 20 tablet 12/31/2024 1/5/2025 Yesenia Espinoza MD          Follow-up Information       Follow up With Specialties Details Why Contact Info Additional Information    [facility] Surgical Oncology, Orthopedic Surgery, Genetics, Physical Medicine and Rehabilitation, Occupational Therapy, Radiology  [facility] orthopedics [address] [facility] [address]  [phone]       [facility] Mason - Orthopedics [facility] Orthoped[facility]   [facility] [address] [facility], 5th Floor  [facility] [address] [phone] [facility] - Main Building, 5th Floor Please park in [facility] and take Atrium elevator            Patient understands the plan and is in agreement, verbalized understanding, questions answered    Yesenia Espinoza MD  Emergency Medicine

## 2024-12-31 NOTE — DISCHARGE INSTRUCTIONS
Diagnosis:  Baker's cyst    Take ibuprofen (also called Advil, Motrin) for your pain. This medicine is available over-the-counter in 200 mg tablets.  - You may take 600 mg every 6 hours, or 800 mg every 8 hours as needed   - Do not take more than this amount, as it can cause kidney problems, bleeding in your stomach, and other serious problems.   - Do not also take naproxen (Aleve) at the same time or on the same day  - If you have heart problems or uncontrolled high blood pressure, you should not take ibuprofen for more than 3 days without discussing with your doctor    If your pain is not controlled with ibuprofen, you may also take acetaminophen (also called Tylenol).  - You may take up to 1,000 mg of Tylenol every 6 hours as needed  - Do not take more than 4,000 mg in 24 hours (1 day) as this may cause liver damage  - Many other medicines include acetaminophen (Tylenol) such as: Norco, Vicodin, Tylenol #3, many cold medicines, etc.  - Please read all labels carefully and do not combine medicines that include acetaminophen.  - If you have a history of liver disease or drink alcohol heavily, do not take acetaminophen (Tylenol) since it can damage your liver    Tests today showed:   Labs Reviewed - No data to display  US Soft Tissue, Lower Extremity, Left   Final Result      Baker's cyst.         Electronically signed by: Dominik Cruz   Date:    12/31/2024   Time:    02:25      X-Ray Knee 3 View Left   Final Result      No acute displaced fracture-dislocation identified.         Electronically signed by: Terrance Strickland MD   Date:    12/30/2024   Time:    20:37          Treatments you had today:   Medications - No data to display    Follow-Up Plan:  - Follow-up with primary care doctor within 3 - 5 days  - Additional testing and/or evaluation as directed by your primary doctor    Return to the Emergency Department for symptoms including but not limited to: worsening symptoms, shortness of breath or chest pain,  vomiting with inability to hold down fluids, fevers greater than 100.4°F, passing out/fainting/unconsciousness, or other concerning symptoms.   Include Pregnancy/Lactation Warning?: No

## 2025-01-18 ENCOUNTER — HOSPITAL ENCOUNTER (EMERGENCY)
Facility: HOSPITAL | Age: 65
Discharge: HOME | End: 2025-01-18
Attending: EMERGENCY MEDICINE
Payer: COMMERCIAL

## 2025-01-18 VITALS
SYSTOLIC BLOOD PRESSURE: 151 MMHG | BODY MASS INDEX: 31.81 KG/M2 | HEIGHT: 73 IN | DIASTOLIC BLOOD PRESSURE: 90 MMHG | HEART RATE: 77 BPM | RESPIRATION RATE: 16 BRPM | TEMPERATURE: 98 F | OXYGEN SATURATION: 100 % | WEIGHT: 240 LBS

## 2025-01-18 DIAGNOSIS — M25.569 KNEE PAIN: ICD-10-CM

## 2025-01-18 PROCEDURE — 99283 EMERGENCY DEPT VISIT LOW MDM: CPT | Mod: 25

## 2025-01-18 PROCEDURE — 20610 DRAIN/INJ JOINT/BURSA W/O US: CPT | Mod: LT

## 2025-01-18 PROCEDURE — 63600175 PHARM REV CODE 636 W HCPCS: Performed by: EMERGENCY MEDICINE

## 2025-01-18 RX ORDER — LIDOCAINE HYDROCHLORIDE 10 MG/ML
5 INJECTION, SOLUTION EPIDURAL; INFILTRATION; INTRACAUDAL; PERINEURAL
Status: COMPLETED | OUTPATIENT
Start: 2025-01-18 | End: 2025-01-18

## 2025-01-18 RX ORDER — IBUPROFEN 400 MG/1
400 TABLET ORAL EVERY 6 HOURS PRN
Qty: 20 TABLET | Refills: 0 | Status: SHIPPED | OUTPATIENT
Start: 2025-01-18

## 2025-01-18 RX ORDER — BUPIVACAINE HYDROCHLORIDE 2.5 MG/ML
5 INJECTION, SOLUTION EPIDURAL; INFILTRATION; INTRACAUDAL
Status: COMPLETED | OUTPATIENT
Start: 2025-01-18 | End: 2025-01-18

## 2025-01-18 RX ORDER — TRIAMCINOLONE ACETONIDE 40 MG/ML
20 INJECTION, SUSPENSION INTRA-ARTICULAR; INTRAMUSCULAR
Status: COMPLETED | OUTPATIENT
Start: 2025-01-18 | End: 2025-01-18

## 2025-01-18 RX ADMIN — TRIAMCINOLONE ACETONIDE 20 MG: 200 INJECTION, SUSPENSION INTRA-ARTICULAR; INTRAMUSCULAR at 01:01

## 2025-01-18 RX ADMIN — BUPIVACAINE HYDROCHLORIDE 12.5 MG: 2.5 INJECTION, SOLUTION EPIDURAL; INFILTRATION; INTRACAUDAL; PERINEURAL at 01:01

## 2025-01-18 RX ADMIN — LIDOCAINE HYDROCHLORIDE 50 MG: 10 INJECTION, SOLUTION EPIDURAL; INFILTRATION; INTRACAUDAL at 01:01

## 2025-01-18 NOTE — ED PROVIDER NOTES
"Encounter Date: 1/18/2025       History     Chief Complaint   Patient presents with    Knee Pain     Had injury L knee yrs ago and started hurting and swollen     64-year-old male presents with left knee pain.  He has chronic left knee problems due to prior injury.  He did a lot of walking yesterday and he has swelling to his knee.  Denies any redness or fever.  Usually he takes ibuprofen but is out of it.  He gets around town with a bicycle and biked here.        Review of patient's allergies indicates:  No Known Allergies  Past Medical History:   Diagnosis Date    Arthritis     Asthma     BPH (benign prostatic hyperplasia)     COPD (chronic obstructive pulmonary disease)     Depression     Hepatitis C     Hypertension     Varicose veins of both lower extremities      History reviewed. No pertinent surgical history.  Family History   Problem Relation Name Age of Onset    Cancer Mother      Heart disease Father       Social History     Tobacco Use    Smoking status: Every Day    Smokeless tobacco: Never   Substance Use Topics    Alcohol use: Yes     Comment: 3-4 beers every other day    Drug use: Not Currently     Types: "Crack" cocaine     Review of Systems    Physical Exam     Initial Vitals [01/18/25 0956]   BP Pulse Resp Temp SpO2   (!) 156/82 89 16 98.3 °F (36.8 °C) 97 %      MAP       --         Physical Exam    Constitutional: He appears well-developed and well-nourished. No distress.   Musculoskeletal:      Comments: Patient has a fusion to his left knee.  There is no focal tenderness.  There is no erythema.  No swelling or sign of DVT.  Full range of motion of joint.           ED Course   Arthrocentesis    Date/Time: 1/18/2025 1:10 PM  Location procedure was performed: [facility] EMERGENCY DEPARTMENT    Performed by: Mitch Rueda MD  Authorized by: Mitch Rueda MD  Consent Done: Yes  Consent: Verbal consent obtained.  Risks and benefits: risks, benefits and alternatives were discussed  Consent given by: " patient  Patient understanding: patient states understanding of the procedure being performed  Needle size: 18 G  Approach: medial  Aspirate amount: 30 mL  Aspirate: blood-tinged  Triamcinolone amount: 20 mg  Bupivacaine 0.25% amount: 6 mL        Labs Reviewed - No data to display       Imaging Results              X-Ray Knee 3 View Left (Final result)  Result time 01/18/25 11:52:25      Final result by Luis Alfredo Moreno MD (01/18/25 11:52:25)                   Impression:      1. Moderate suprapatellar effusion, no convincing acute displaced fracture or dislocation of the knee.      Electronically signed by: Luis Alfredo Moreno MD  Date:    01/18/2025  Time:    11:52               Narrative:    EXAMINATION:  XR KNEE 3 VIEW LEFT    CLINICAL HISTORY:  Pain in unspecified knee    TECHNIQUE:  AP, lateral, and Merchant views of the left knee were performed.    COMPARISON:  12/30/2024    FINDINGS:  Three views left knee.    No acute displaced fracture or dislocation of the knee.  No radiopaque foreign body.  No significant edema.  There are degenerative changes of the knee.  There is a moderate suprapatellar effusion.                                       Medications   LIDOcaine (PF) 10 mg/ml (1%) injection 50 mg (50 mg Infiltration Given by Provider 1/18/25 1687)   BUPivacaine (PF) 0.25% (2.5 mg/ml) injection 12.5 mg (12.5 mg Subcutaneous Given by Provider 1/18/25 1308)   triamcinolone acetonide injection 20 mg (20 mg Intra-articular Given by Provider 1/18/25 1308)     Medical Decision Making  Patient with chronic knee pain presents with a new effusion after overuse of his knee yesterday.  Suspect this is arthritis related.  Doubt septic joint or gout.  Highly doubt DVT or fracture. Will obtain x-rays.  Offered drainage and Marcaine/steroid injections.  He was deciding.    Amount and/or Complexity of Data Reviewed  Radiology: ordered.    Risk  Prescription drug management.                                      Clinical  Impression:  Final diagnoses:  [M25.569] Knee pain          ED Disposition Condition    Discharge Stable          ED Prescriptions       Medication Sig Dispense Start Date End Date Auth. Provider    ibuprofen (ADVIL,MOTRIN) 400 MG tablet Take 1 tablet (400 mg total) by mouth every 6 (six) hours as needed. 20 tablet 1/18/2025 -- Mitch Rueda MD          Follow-up Information       Follow up With Specialties Details Why Contact Info    Your Primary Care Physician  Schedule an appointment as soon as possible for a visit in 2 days      [facility] - Emergency Dept Emergency Medicine  If symptoms worsen [address] [place] [id number] [phone]    Hayes Huang MD Sports Medicine, Orthopedic Surgery Schedule an appointment as soon as possible for a visit   [address] [place] [address]  [phone]               Mitch Rueda MD  01/18/25 0795

## 2025-01-18 NOTE — ED NOTES
LOC: The patient is awake and alert; oriented x 3 and speaking appropriately.  APPEARANCE: Patient resting comfortably, patient is clean and well groomed  SKIN: warm and dry, normal skin turgor & moist mucus membranes, skin intact, no breakdown noted.  MUSCULOSKELETAL: Patient moving all extremities well, no obvious swelling or deformities noted, c/o left knee pain and swelling  RESPIRATORY: Airway is open and patent, respirations are spontaneous, normal effort and rate  CARDIAC: Patient has a normal rate, no peripheral edema noted, capillary refill < 3 seconds; No complaints of chest pain   ABDOMEN: Soft and non tender to palpation, no distention noted

## 2025-01-18 NOTE — ED TRIAGE NOTES
C/o left knee pain - started swelling after walking a mile. Yesterday.  Hears a clicking noise when flexed.

## 2025-03-10 ENCOUNTER — HOSPITAL ENCOUNTER (EMERGENCY)
Facility: HOSPITAL | Age: 65
Discharge: HOME | End: 2025-03-10
Attending: EMERGENCY MEDICINE
Payer: COMMERCIAL

## 2025-03-10 VITALS
HEART RATE: 78 BPM | TEMPERATURE: 99 F | HEIGHT: 73 IN | DIASTOLIC BLOOD PRESSURE: 76 MMHG | BODY MASS INDEX: 31.81 KG/M2 | SYSTOLIC BLOOD PRESSURE: 165 MMHG | OXYGEN SATURATION: 100 % | WEIGHT: 240 LBS | RESPIRATION RATE: 18 BRPM

## 2025-03-10 DIAGNOSIS — I10 HYPERTENSION, UNSPECIFIED TYPE: Primary | ICD-10-CM

## 2025-03-10 DIAGNOSIS — M25.569 KNEE PAIN: ICD-10-CM

## 2025-03-10 PROCEDURE — 99284 EMERGENCY DEPT VISIT MOD MDM: CPT | Mod: 25

## 2025-03-10 PROCEDURE — 25000003 PHARM REV CODE 250: Performed by: EMERGENCY MEDICINE

## 2025-03-10 RX ORDER — NAPROXEN 500 MG/1
500 TABLET ORAL
Status: COMPLETED | OUTPATIENT
Start: 2025-03-10 | End: 2025-03-10

## 2025-03-10 RX ORDER — MIDAZOLAM HYDROCHLORIDE 1 MG/ML
INJECTION, SOLUTION INTRAMUSCULAR; INTRAVENOUS
Status: DISCONTINUED
Start: 2025-03-10 | End: 2025-03-10 | Stop reason: HOSPADM

## 2025-03-10 RX ORDER — ACETAMINOPHEN 500 MG
1000 TABLET ORAL
Status: COMPLETED | OUTPATIENT
Start: 2025-03-10 | End: 2025-03-10

## 2025-03-10 RX ORDER — NAPROXEN 500 MG/1
500 TABLET ORAL 2 TIMES DAILY PRN
Qty: 30 TABLET | Refills: 0 | Status: SHIPPED | OUTPATIENT
Start: 2025-03-10

## 2025-03-10 RX ORDER — NAPROXEN 500 MG/1
500 TABLET ORAL 2 TIMES DAILY PRN
Qty: 30 TABLET | Refills: 0 | Status: SHIPPED | OUTPATIENT
Start: 2025-03-10 | End: 2025-03-10

## 2025-03-10 RX ORDER — ACETAMINOPHEN 325 MG/1
650 TABLET ORAL EVERY 6 HOURS PRN
Qty: 30 TABLET | Refills: 0 | Status: SHIPPED | OUTPATIENT
Start: 2025-03-10

## 2025-03-10 RX ORDER — ROCURONIUM BROMIDE 10 MG/ML
INJECTION, SOLUTION INTRAVENOUS
Status: DISCONTINUED
Start: 2025-03-10 | End: 2025-03-10 | Stop reason: HOSPADM

## 2025-03-10 RX ORDER — ETOMIDATE 2 MG/ML
INJECTION INTRAVENOUS
Status: DISCONTINUED
Start: 2025-03-10 | End: 2025-03-10 | Stop reason: HOSPADM

## 2025-03-10 RX ADMIN — NAPROXEN 500 MG: 500 TABLET ORAL at 07:03

## 2025-03-10 RX ADMIN — ACETAMINOPHEN 1000 MG: 500 TABLET ORAL at 07:03

## 2025-03-10 NOTE — ED PROVIDER NOTES
Encounter Date: 3/10/2025       History     Chief Complaint   Patient presents with    Knee Pain     L knee swollen, denies injury, denies fever or chills , had knee drained 3-4 weeks ago     HPI  64-year-old male with past medical history as noted below coming in with 1 week of worsening left-sided knee pain and swelling.    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  He was here in January where he had his knee drained which she said helped some but the pain has progressively returned.  He denies any direct trauma to the knee or falls.  No other joint pain currently although sometimes the bottom of his feet hurts.  He is able to range his knee and ambulates still even this by the symptoms.  He has not tried anything for his symptoms.    No fevers.    Review of patient's allergies indicates:  No Known Allergies  Past Medical History:   Diagnosis Date    Arthritis     Asthma     BPH (benign prostatic hyperplasia)     COPD (chronic obstructive pulmonary disease)     Depression     Hepatitis C     Hypertension     Varicose veins of both lower extremities      History reviewed. No pertinent surgical history.  Family History   Problem Relation Name Age of Onset    Cancer Mother      Heart disease Father       Social History[1]  Review of Systems    Physical Exam     Initial Vitals [03/10/25 1730]   BP Pulse Resp Temp SpO2   (!) 171/83 84 20 98.8 °F (37.1 °C) 97 %      MAP       --         Physical Exam    Physical Exam:  CONSTITUTIONAL: Well developed, well nourished, in no acute distress.  HENT: Normocephalic, atraumatic    EYES: Sclerae anicteric   NECK: Supple, no thyroid enlargement  RESPIRATORY: Speaking in full sentences. Breathing comfortably.   NEUROLOGIC: Alert, interacting normally. No facial droop. Voice is clear. Speech is fluent.  MSK:   Left  lower extremity:  Left knee is mildly swollen and mild increased warmth with normal range motion, he is able to fully flex and extend.  There is no bony tenderness to palpation.  No laxity.  No external redness.  He has some varicose veins the calf but no calf tenderness or swelling.  No pretibial edema.  No bony tenderness to the left hip, femur, tib-fib, ankle or foot.  Moving all four extremities.  SKIN: Warm and dry. No visible rash on exposed areas of skin.    Psych: Mood and affect normal.       ED Course   Procedures  Labs Reviewed - No data to display         Imaging Results              X-Ray Knee 3 View Left (Final result)  Result time 03/10/25 21:11:46   Procedure changed from X-Ray Knee 3 View Right     Final result by Dominik Cruz DO (03/10/25 21:11:46)                   Impression:      No acute osseous abnormality.      Electronically signed by: Dominik Cruz  Date:    03/10/2025  Time:    21:11               Narrative:    EXAMINATION:  XR KNEE 3 VIEW LEFT    CLINICAL HISTORY:  knee pain; Pain in unspecified knee    TECHNIQUE:  AP, lateral, and Merchant views of the left knee were performed.    COMPARISON:  01/18/2025.    FINDINGS:  There is osteopenia.  There is no acute fracture or dislocation. Alignment is normal. Mild joint space narrowing of the medial and lateral tibiofemoral compartments with adjacent marginal osteophytes.  There is no joint effusion.                                       Medications   acetaminophen tablet 1,000 mg (1,000 mg Oral Given 3/10/25 1930)   naproxen tablet 500 mg (500 mg Oral Given 3/10/25 1929)     Medical Decision Making  Amount and/or Complexity of Data Reviewed  Radiology: ordered.    Risk  OTC drugs.  Prescription drug management.      64-year-old male with past history as noted coming in with left knee pain and swelling over the last several months, got drained in January but pain has progressively returned.    On exam he has normal range motion slightly  swollen knee no external erythema no laxity no bony tenderness.    Despite his drainage in January at this time given his normal range motion I did not believe that emergently draining his knee is indicated as it introduces risks of infection and at this time this is not consistent with septic arthritis or gout.    Has a negative DVT ultrasound of the leg in December.  Clinically not consistent with DVT.     Suspect likely osteoarthritis/arthritis exacerbation.    Labs not likely to be beneficial in his case.  Do not suspect septic arthritis.    He has not taken anything for the pain thus far will 1st trial conservative management with Tylenol and Motrin.  Will repeat x-rays to look for any acute bony abnormalities.    If x-ray is benign will discharge with course of NSAIDs and Tylenol and orthopedic follow up for further outpatient evaluation.    Update:  He remains hemodynamically stable in the emergency department.    X-ray is unremarkable for acute pathology does show arthritic changes.    Will discharge with supportive care Tylenol, Motrin and sports Medicine referral for further outpatient management.      Attempted to give verbal discharge instructions with the patient was not in his seat when I went to go see him and subsequently was discharged prior to my returning to update him.  Discharge instructions were on discharge paperwork.                                    Clinical Impression:  Final diagnoses:  [M25.569] Knee pain  [I10] Hypertension, unspecified type (Primary)          ED Disposition Condition    Discharge Stable          ED Prescriptions       Medication Sig Dispense Start Date End Date Auth. Provider    acetaminophen (TYLENOL) 325 MG tablet Take 2 tablets (650 mg total) by mouth every 6 (six) hours as needed for Pain. 30 tablet 3/10/2025 -- Zain Shah MD    naproxen (NAPROSYN) 500 MG tablet  (Status: Discontinued) Take 1 tablet (500 mg total) by mouth 2 (two) times daily as needed  "(pain). Take with meals. 30 tablet 3/10/2025 3/10/2025 Zain Shah MD    naproxen (NAPROSYN) 500 MG tablet Take 1 tablet (500 mg total) by mouth 2 (two) times daily as needed (pain). Take with meals. 30 tablet 3/10/2025 -- Zain Shah MD          Follow-up Information       Follow up With Specialties Details Why Contact Info Additional Information    [facility] [address] [facility]   [address] [place] [id number] [phone] [facility] - Main Building, 7th Floor Please park in [facility] and use Clinic elevator                 [1]   Social History  Tobacco Use    Smoking status: Every Day     Current packs/day: 0.50     Types: Cigarettes    Smokeless tobacco: Never   Substance Use Topics    Alcohol use: Yes     Comment: 3-4 beers every other day    Drug use: Not Currently     Types: "Crack" cocaine        Zain Shah MD  03/11/25 1147    "

## 2025-03-10 NOTE — FIRST PROVIDER EVALUATION
"Medical screening examination initiated.  I have conducted a focused provider triage encounter, findings are as follows:    Brief history of present illness:  Reports L knee is swollen and painful. Has been seen in ED previously for same. Reports no fevers or chills. State has needed it drained in the past.     Vitals:    03/10/25 1730   BP: (!) 171/83   Pulse: 84   Resp: 20   Temp: 98.8 °F (37.1 °C)   TempSrc: Oral   SpO2: 97%   Weight: 108.9 kg (240 lb)   Height: 6' 0.5" (1.842 m)       Pertinent physical exam:  ambulatory, able to bear weight.     Brief workup plan:  labs    Preliminary workup initiated; this workup will be continued and followed by the physician or advanced practice provider that is assigned to the patient when roomed.  "

## 2025-03-11 NOTE — DISCHARGE INSTRUCTIONS
Your x-ray did not show any signs of broken bones or large amount of fluid.  There was some arthritis there.    Please take Tylenol and ibuprofen for pain control outpatient.  You were referred to sports Medicine.    If you develop any new, worsening worrisome symptoms please return emergency department for re-evaluation.    Your blood pressure was elevated, continue take your home blood pressure medications and follow up with the primary care doctor for continued blood pressure management.